# Patient Record
Sex: FEMALE | Race: OTHER | NOT HISPANIC OR LATINO | URBAN - METROPOLITAN AREA
[De-identification: names, ages, dates, MRNs, and addresses within clinical notes are randomized per-mention and may not be internally consistent; named-entity substitution may affect disease eponyms.]

---

## 2018-03-16 ENCOUNTER — EMERGENCY (EMERGENCY)
Facility: HOSPITAL | Age: 58
LOS: 1 days | Discharge: HOME | End: 2018-03-16
Attending: EMERGENCY MEDICINE | Admitting: EMERGENCY MEDICINE
Payer: MEDICAID

## 2018-03-16 VITALS
OXYGEN SATURATION: 100 % | SYSTOLIC BLOOD PRESSURE: 157 MMHG | HEART RATE: 76 BPM | TEMPERATURE: 98 F | RESPIRATION RATE: 16 BRPM | DIASTOLIC BLOOD PRESSURE: 78 MMHG

## 2018-03-16 PROCEDURE — 99283 EMERGENCY DEPT VISIT LOW MDM: CPT

## 2018-03-16 RX ORDER — IBUPROFEN 200 MG
600 TABLET ORAL ONCE
Qty: 0 | Refills: 0 | Status: COMPLETED | OUTPATIENT
Start: 2018-03-16 | End: 2018-03-16

## 2018-03-16 RX ORDER — OXYCODONE AND ACETAMINOPHEN 5; 325 MG/1; MG/1
1 TABLET ORAL ONCE
Qty: 0 | Refills: 0 | Status: DISCONTINUED | OUTPATIENT
Start: 2018-03-16 | End: 2018-03-16

## 2018-03-16 RX ORDER — VALACYCLOVIR 500 MG/1
1 TABLET, FILM COATED ORAL
Qty: 21 | Refills: 0 | OUTPATIENT
Start: 2018-03-16 | End: 2018-03-22

## 2018-03-16 RX ORDER — IBUPROFEN 200 MG
1 TABLET ORAL
Qty: 20 | Refills: 0 | OUTPATIENT
Start: 2018-03-16

## 2018-03-16 RX ORDER — VALACYCLOVIR 500 MG/1
1000 TABLET, FILM COATED ORAL ONCE
Qty: 0 | Refills: 0 | Status: COMPLETED | OUTPATIENT
Start: 2018-03-16 | End: 2018-03-16

## 2018-03-16 RX ADMIN — Medication 600 MILLIGRAM(S): at 22:00

## 2018-03-16 RX ADMIN — VALACYCLOVIR 1000 MILLIGRAM(S): 500 TABLET, FILM COATED ORAL at 22:54

## 2018-03-16 RX ADMIN — OXYCODONE AND ACETAMINOPHEN 1 TABLET(S): 5; 325 TABLET ORAL at 22:50

## 2018-03-16 RX ADMIN — OXYCODONE AND ACETAMINOPHEN 1 TABLET(S): 5; 325 TABLET ORAL at 22:00

## 2018-03-16 RX ADMIN — Medication 600 MILLIGRAM(S): at 22:50

## 2018-03-16 NOTE — ED PROVIDER NOTE - OBJECTIVE STATEMENT
57F with PMH of DM and CAD who comes in with 3 days of rash to the L flank radiating to the L groin.  + vesicles.  No F/C/N/V/D/urinary sx/CP/SOB/cough/HA.  + daughter pregnant x 3 months who has already had chicken pox.

## 2018-03-16 NOTE — ED ADULT TRIAGE NOTE - CHIEF COMPLAINT QUOTE
pt arrives w/ c/o left flank pain radiating to left groin. pt with red rash and blister filled from groin wrapping to left back side. pt placed in iso rm.

## 2018-03-16 NOTE — ED PROVIDER NOTE - SKIN COLOR
maculopapular and vesicular rash from L flank to L groin in a dermatomal distribution, not crusted over yet

## 2018-03-16 NOTE — ED PROVIDER NOTE - MEDICAL DECISION MAKING DETAILS
Cabot: 57F with shingles.  I used a Stevenson  to tell her and her daughter that she should not be in contact with anyone who has not had chicken pox or the vaccine, and she should stay away from children, elderly people, and pregnant people.  Her daughter has an OB/gyn who she will see on Sunday to be tested for varicella IgG.  I spoke with gyn and confirmed that this is the appropriate management for pregnant exposure to varicella.

## 2018-03-17 RX ORDER — VALACYCLOVIR 500 MG/1
1 TABLET, FILM COATED ORAL
Qty: 21 | Refills: 0 | OUTPATIENT
Start: 2018-03-17 | End: 2018-03-23

## 2018-03-17 NOTE — ED POST DISCHARGE NOTE - ADDITIONAL DOCUMENTATION
Received call from patient stating is at the pharmacy and rx was sent, checked chart and saw it was printed.  Called in RX for valtrex 1g tid x 7 days and motrin 600mg every 8 hours as needed for pain.

## 2018-03-31 ENCOUNTER — EMERGENCY (EMERGENCY)
Facility: HOSPITAL | Age: 58
LOS: 1 days | Discharge: HOME | End: 2018-03-31
Admitting: EMERGENCY MEDICINE
Payer: MEDICAID

## 2018-03-31 ENCOUNTER — EMERGENCY (EMERGENCY)
Facility: HOSPITAL | Age: 58
LOS: 1 days | Discharge: HOME | End: 2018-03-31
Attending: EMERGENCY MEDICINE | Admitting: HOSPITALIST
Payer: MEDICAID

## 2018-03-31 VITALS
HEART RATE: 75 BPM | DIASTOLIC BLOOD PRESSURE: 72 MMHG | TEMPERATURE: 98 F | OXYGEN SATURATION: 100 % | SYSTOLIC BLOOD PRESSURE: 151 MMHG | RESPIRATION RATE: 16 BRPM

## 2018-03-31 VITALS
OXYGEN SATURATION: 100 % | SYSTOLIC BLOOD PRESSURE: 173 MMHG | HEART RATE: 88 BPM | TEMPERATURE: 98 F | RESPIRATION RATE: 18 BRPM | DIASTOLIC BLOOD PRESSURE: 83 MMHG

## 2018-03-31 PROBLEM — I25.10 ATHEROSCLEROTIC HEART DISEASE OF NATIVE CORONARY ARTERY WITHOUT ANGINA PECTORIS: Chronic | Status: ACTIVE | Noted: 2018-03-16

## 2018-03-31 PROBLEM — E11.9 TYPE 2 DIABETES MELLITUS WITHOUT COMPLICATIONS: Chronic | Status: ACTIVE | Noted: 2018-03-16

## 2018-03-31 LAB
ALBUMIN SERPL ELPH-MCNC: 4.1 G/DL — SIGNIFICANT CHANGE UP (ref 3.3–5)
ALP SERPL-CCNC: 106 U/L — SIGNIFICANT CHANGE UP (ref 40–120)
ALT FLD-CCNC: 23 U/L — SIGNIFICANT CHANGE UP (ref 4–33)
APTT BLD: 30.4 SEC — SIGNIFICANT CHANGE UP (ref 27.5–37.4)
AST SERPL-CCNC: 22 U/L — SIGNIFICANT CHANGE UP (ref 4–32)
BASE EXCESS BLDV CALC-SCNC: 3.2 MMOL/L — SIGNIFICANT CHANGE UP
BASOPHILS # BLD AUTO: 0.06 K/UL — SIGNIFICANT CHANGE UP (ref 0–0.2)
BASOPHILS NFR BLD AUTO: 0.6 % — SIGNIFICANT CHANGE UP (ref 0–2)
BILIRUB SERPL-MCNC: 0.3 MG/DL — SIGNIFICANT CHANGE UP (ref 0.2–1.2)
BLD GP AB SCN SERPL QL: NEGATIVE — SIGNIFICANT CHANGE UP
BLOOD GAS VENOUS - CREATININE: 0.38 MG/DL — LOW (ref 0.5–1.3)
BUN SERPL-MCNC: 11 MG/DL — SIGNIFICANT CHANGE UP (ref 7–23)
CALCIUM SERPL-MCNC: 9 MG/DL — SIGNIFICANT CHANGE UP (ref 8.4–10.5)
CHLORIDE BLDV-SCNC: 101 MMOL/L — SIGNIFICANT CHANGE UP (ref 96–108)
CHLORIDE SERPL-SCNC: 99 MMOL/L — SIGNIFICANT CHANGE UP (ref 98–107)
CK MB BLD-MCNC: 1 NG/ML — SIGNIFICANT CHANGE UP (ref 1–4.7)
CK SERPL-CCNC: 61 U/L — SIGNIFICANT CHANGE UP (ref 25–170)
CO2 SERPL-SCNC: 27 MMOL/L — SIGNIFICANT CHANGE UP (ref 22–31)
CREAT SERPL-MCNC: 0.52 MG/DL — SIGNIFICANT CHANGE UP (ref 0.5–1.3)
EOSINOPHIL # BLD AUTO: 0.25 K/UL — SIGNIFICANT CHANGE UP (ref 0–0.5)
EOSINOPHIL NFR BLD AUTO: 2.4 % — SIGNIFICANT CHANGE UP (ref 0–6)
GAS PNL BLDV: 138 MMOL/L — SIGNIFICANT CHANGE UP (ref 136–146)
GLUCOSE BLDV-MCNC: 202 — HIGH (ref 70–99)
GLUCOSE SERPL-MCNC: 209 MG/DL — HIGH (ref 70–99)
HCO3 BLDV-SCNC: 26 MMOL/L — SIGNIFICANT CHANGE UP (ref 20–27)
HCT VFR BLD CALC: 40.9 % — SIGNIFICANT CHANGE UP (ref 34.5–45)
HCT VFR BLDV CALC: 41.3 % — SIGNIFICANT CHANGE UP (ref 34.5–45)
HGB BLD-MCNC: 13.1 G/DL — SIGNIFICANT CHANGE UP (ref 11.5–15.5)
HGB BLDV-MCNC: 13.4 G/DL — SIGNIFICANT CHANGE UP (ref 11.5–15.5)
IMM GRANULOCYTES # BLD AUTO: 0.03 # — SIGNIFICANT CHANGE UP
IMM GRANULOCYTES NFR BLD AUTO: 0.3 % — SIGNIFICANT CHANGE UP (ref 0–1.5)
INR BLD: 0.97 — SIGNIFICANT CHANGE UP (ref 0.88–1.17)
LACTATE BLDV-MCNC: 2.2 MMOL/L — HIGH (ref 0.5–2)
LYMPHOCYTES # BLD AUTO: 3.75 K/UL — HIGH (ref 1–3.3)
LYMPHOCYTES # BLD AUTO: 36 % — SIGNIFICANT CHANGE UP (ref 13–44)
MCHC RBC-ENTMCNC: 24.7 PG — LOW (ref 27–34)
MCHC RBC-ENTMCNC: 32 % — SIGNIFICANT CHANGE UP (ref 32–36)
MCV RBC AUTO: 77.2 FL — LOW (ref 80–100)
MONOCYTES # BLD AUTO: 0.75 K/UL — SIGNIFICANT CHANGE UP (ref 0–0.9)
MONOCYTES NFR BLD AUTO: 7.2 % — SIGNIFICANT CHANGE UP (ref 2–14)
NEUTROPHILS # BLD AUTO: 5.59 K/UL — SIGNIFICANT CHANGE UP (ref 1.8–7.4)
NEUTROPHILS NFR BLD AUTO: 53.5 % — SIGNIFICANT CHANGE UP (ref 43–77)
NRBC # FLD: 0 — SIGNIFICANT CHANGE UP
PCO2 BLDV: 49 MMHG — SIGNIFICANT CHANGE UP (ref 41–51)
PH BLDV: 7.37 PH — SIGNIFICANT CHANGE UP (ref 7.32–7.43)
PLATELET # BLD AUTO: 308 K/UL — SIGNIFICANT CHANGE UP (ref 150–400)
PMV BLD: 10.3 FL — SIGNIFICANT CHANGE UP (ref 7–13)
PO2 BLDV: 43 MMHG — HIGH (ref 35–40)
POTASSIUM BLDV-SCNC: 4.5 MMOL/L — SIGNIFICANT CHANGE UP (ref 3.4–4.5)
POTASSIUM SERPL-MCNC: 4.5 MMOL/L — SIGNIFICANT CHANGE UP (ref 3.5–5.3)
POTASSIUM SERPL-SCNC: 4.5 MMOL/L — SIGNIFICANT CHANGE UP (ref 3.5–5.3)
PROT SERPL-MCNC: 7.9 G/DL — SIGNIFICANT CHANGE UP (ref 6–8.3)
PROTHROM AB SERPL-ACNC: 10.8 SEC — SIGNIFICANT CHANGE UP (ref 9.8–13.1)
RBC # BLD: 5.3 M/UL — HIGH (ref 3.8–5.2)
RBC # FLD: 16.3 % — HIGH (ref 10.3–14.5)
RH IG SCN BLD-IMP: POSITIVE — SIGNIFICANT CHANGE UP
SAO2 % BLDV: 74.5 % — SIGNIFICANT CHANGE UP (ref 60–85)
SODIUM SERPL-SCNC: 138 MMOL/L — SIGNIFICANT CHANGE UP (ref 135–145)
TROPONIN T SERPL-MCNC: < 0.06 NG/ML — SIGNIFICANT CHANGE UP (ref 0–0.06)
WBC # BLD: 10.43 K/UL — SIGNIFICANT CHANGE UP (ref 3.8–10.5)
WBC # FLD AUTO: 10.43 K/UL — SIGNIFICANT CHANGE UP (ref 3.8–10.5)

## 2018-03-31 PROCEDURE — 99285 EMERGENCY DEPT VISIT HI MDM: CPT

## 2018-03-31 PROCEDURE — 99282 EMERGENCY DEPT VISIT SF MDM: CPT

## 2018-03-31 RX ORDER — SODIUM CHLORIDE 9 MG/ML
1000 INJECTION INTRAMUSCULAR; INTRAVENOUS; SUBCUTANEOUS ONCE
Qty: 0 | Refills: 0 | Status: COMPLETED | OUTPATIENT
Start: 2018-03-31 | End: 2018-03-31

## 2018-03-31 RX ORDER — ASPIRIN/CALCIUM CARB/MAGNESIUM 324 MG
81 TABLET ORAL ONCE
Qty: 0 | Refills: 0 | Status: COMPLETED | OUTPATIENT
Start: 2018-03-31 | End: 2018-03-31

## 2018-03-31 NOTE — ED ADULT TRIAGE NOTE - CHIEF COMPLAINT QUOTE
R/O CVA    as per son Iaw... pt was washing dishes at approx 8:30pm and couldn't close the faucet, had slurred and slow speech, unsteady gait. accriccardo 209. seen by carolynn- code stroke called.

## 2018-03-31 NOTE — ED PROVIDER NOTE - OBJECTIVE STATEMENT
56yo F pmh cad, dm bibems for slurred speech, LUE & LLE weakness since 1.5 hour ago, called in as code stroke I,hannah garcia, can speak Yakut & delfina fluently & served as interpretor for pt   58yo F pmh cad, dm, hld, abnormal heart rhythm (Afib since pt on diltiazem) bibems for slurred speech, slurred speech  from 6-6.30pm. also felt LUE weaknes & generalized weakness. felt RUE numbness. symptoms resolved on their own pt now back to baseline., called in as code stroke. As per family, water was coming out of her mouth when she was trying to speak. minutes. She felt weakness overall. She denied weakness, numbness, headache and change in vision currently. Her speech is back to baseline. I,hannah garcia, can speak Serbian & delfina fluently & served as interpretor for pt   56yo F pmh cad, dm, hld, abnormal heart rhythm ( possible Afib since pt on diltiazem) bibems for slurred speech from 6-6.30pm. also felt LUE weaknes & generalized weakness. felt RUE numbness. symptoms resolved on their own pt now back to baseline., called in as code stroke. As per family, water was coming out of her mouth when she was trying to speak. minutes. She felt weakness overall. She denied weakness, numbness, headache and change in vision currently. Her speech is back to baseline.

## 2018-03-31 NOTE — ED ADULT NURSE NOTE - OBJECTIVE STATEMENT
Niranjan RN received pt to CT nonenglish speaking family at bedside translates, pt reports waking up from nap around 930 and "feeling like she couldn't talk". Also c/o B/L upper extremity weakness. No extremity weakness noted at this time, no speech slurring, no facial droop. Family reports hx of CAD and DM, pt pending CT scan, neuro at bedside, IV placed, labs sent, VS as documented, will continue to monitor.

## 2018-03-31 NOTE — ED PROVIDER NOTE - MEDICAL DECISION MAKING DETAILS
57 Y F with PMH of CAD, DM, HTN presented to ED after episode of slurred speech at home. As per family, water was coming out of her mouth when she was trying to speak. Her NIHSS was 0 and MRS is 0. Her symptoms resolved after 30 minutes. She felt weakness overall. She denied weakness, numbness, headache and change in vision currently. Her speech is back to baseline. ct h wnl, neuro consulted, send to cdu for mri 56yo F pmh cad, dm, hld, abnormal heart rhythm ( possible Afib since pt on diltiazem) p/w transiet slurred speech, UE numbness. CTh wnl, neuro consulted, send to cdu for mri

## 2018-03-31 NOTE — CONSULT NOTE ADULT - SUBJECTIVE AND OBJECTIVE BOX
HPI:    57 Y F with PMH of CAD, DM, HTN presented to ED after episode of slurred speech at home. As per family, water was coming out of her mouth when she was trying to speak. Her NIHSS was 0 and MRS is 0. Her symptoms resolved after 30 minutes. She felt weakness overall. She denied weakness, numbness, headache and change in vision currently. Her speech is back to baseline.       MEDICATIONS  (STANDING):    MEDICATIONS  (PRN):      PAST MEDICAL & SURGICAL HISTORY:  CAD (coronary artery disease)  DM (diabetes mellitus)  No significant past surgical history      FAMILY HISTORY:      Allergies    No Known Allergies    Intolerances          SHx - No smoking, No ETOH, No drug abuse      Review of Systems:  CONSTITUTIONAL:  No weight loss, fever, chills, weakness or fatigue.  HEENT:  Eyes:  No visual loss, blurred vision, double vision or yellow sclerae. Ears, Nose, Throat:  No hearing loss, sneezing, congestion, runny nose or sore throat.  SKIN:  No rash or itching.  CARDIOVASCULAR:  No chest pain, chest pressure or chest discomfort. No palpitations or edema.  RESPIRATORY:  No shortness of breath, cough or sputum.  GASTROINTESTINAL:  No anorexia, nausea, vomiting or diarrhea. No abdominal pain or blood.  GENITOURINARY:  NO Burning on urination.   NEUROLOGICAL: See HPI  MUSCULOSKELETAL:  No muscle, back pain, joint pain or stiffness.  HEMATOLOGIC:  No anemia, bleeding or bruising.  LYMPHATICS:  No enlarged nodes. No history of splenectomy.  PSYCHIATRIC:  No history of depression or anxiety.  ENDOCRINOLOGIC:  No reports of sweating, cold or heat intolerance. No polyuria or polydipsia.  ALLERGIES:  No history of asthma, hives, eczema or rhinitis.        Vital Signs Last 24 Hrs  T(C): 36.7 (31 Mar 2018 22:07), Max: 36.7 (31 Mar 2018 22:07)  T(F): 98 (31 Mar 2018 22:07), Max: 98 (31 Mar 2018 22:07)  HR: 88 (31 Mar 2018 22:07) (75 - 88)  BP: 173/83 (31 Mar 2018 22:07) (151/72 - 173/83)  BP(mean): --  RR: 18 (31 Mar 2018 22:07) (16 - 18)  SpO2: 100% (31 Mar 2018 22:07) (100% - 100%)    General Exam:   General appearance: No acute distress                   Neurological Exam:    Mental Status: Orientated to self, date and place.  Attention intact.  No dysarthria, aphasia or neglect.  Cranial Nerves: PERRL, EOMI, CN V1-3 intact to light touch and pinprick.  No facial asymmetry, Tongue, uvula and palate midline.      Motor:   Tone: normal.                  Strength:   intact 5/5 in all four extremities   Pronator drift: none                 Dysmetria: None to finger-nose-finger or heel-shin-heel  No truncal ataxia.    Tremor: No resting, postural or action tremor.  No myoclonus.    Sensation: intact to light touch    Deep Tendon Reflexes: 1+ bilateral biceps, triceps, brachioradialis, knee and ankle  Toes flexor bilaterally    Gait: normal and stable.      Other:    03-31    138  |  99  |  11  ----------------------------<  209<H>  4.5   |  27  |  0.52    Ca    9.0      31 Mar 2018 22:20    TPro  7.9  /  Alb  4.1  /  TBili  0.3  /  DBili  x   /  AST  22  /  ALT  23  /  AlkPhos  106  03-31 03-31    138  |  99  |  11  ----------------------------<  209<H>  4.5   |  27  |  0.52    Ca    9.0      31 Mar 2018 22:20    TPro  7.9  /  Alb  4.1  /  TBili  0.3  /  DBili  x   /  AST  22  /  ALT  23  /  AlkPhos  106  03-31                          13.1   10.43 )-----------( 308      ( 31 Mar 2018 22:20 )             40.9       Radiology    CT head     < from: CT Brain Stroke Protocol (03.31.18 @ 22:27) >    IMPRESSION:     No acute intracranial hemorrhage or mass effect.    < end of copied text >    CTA head and neck : unremarkable to my eye - official report pending

## 2018-03-31 NOTE — ED PROVIDER NOTE - MEDICAL DECISION MAKING DETAILS
pt with post herpetic neuralgia. Unable to tolerate gabapentin but does get some relief with ibuprofen. Will d/c on ibuprofen with pmd f/u

## 2018-03-31 NOTE — ED PROVIDER NOTE - ATTENDING CONTRIBUTION TO CARE
57F p/w BIBEMS with c/o slurred speech, L side weakness since 630Pm.  WAs seen here earlier for shingles pain.  Was trying to get water from a tap and both hands felt numb, L hand was weak.  Since last week was in bed, not getting around due to pain from the shingles.  Was also having slurred speech at 630pm.  Pt was drooling and couldn't speak properly.  No LE weakness/numbness.  Arm sx resolved in 1/2 hours, but speech is still slurred per family and per her as well.  Never happened before.    PMHX CAD, DM, htn, anemia, "fast HR"  meds - metformin, enalapril, iron, diltiazem, gabapentin  PMD - Beau Jeferson  PSHX - KIM  All - nka (intol NSAIDS) 57F p/w BIBEMS with c/o slurred speech, L side weakness since 630Pm.  WAs seen here earlier for shingles pain.  Was trying to get water from a tap and both hands felt numb, L hand was weak.  Since last week was in bed, not getting around due to pain from the shingles.  Was also having slurred speech at 630pm.  Pt was drooling and couldn't speak properly.  No LE weakness/numbness.  Arm sx resolved in 1/2 hours, but speech is still slurred per family and per her as well.  Never happened before.    PMHX CAD, DM, htn, anemia, "fast HR"  meds - metformin, enalapril, iron, diltiazem, gabapentin  PMD - Beau Jeferson  PSHX - KIM  All - nka (intol NSAIDS)  VS:  unremarkable except HTN    GEN - NAD; well appearing; A+O x3 Speech fluent  HEAD - NC/AT     ENT - PEERL, EOMI, mucous membranes  moist , no discharge      NECK: Neck supple, non-tender without lymphadenopathy, no masses, no JVD  PULM - CTA b/l,  symmetric breath sounds  COR -  normal heart sounds    ABD - , ND, NT, soft, no guarding, no rebound, no masses    BACK - no CVA tenderness, nontender spine     EXTREMS - no edema, no deformity, warm and well perfused    SKIN - no rash or bruising      NEUROLOGIC - alert, CN 2-12 intact, sensation nl, motor 5/5 RUE/LUE/RLE/LLE.

## 2018-03-31 NOTE — ED PROVIDER NOTE - CARE PLAN
Principal Discharge DX:	Numbness Principal Discharge DX:	Numbness  Secondary Diagnosis:	Slurred speech  Secondary Diagnosis:	Transient cerebral ischemia, unspecified type

## 2018-03-31 NOTE — ED PROVIDER NOTE - NSCAREINITIATED _GEN_ER
----- Message from Mirella Dami sent at 2/10/2017 11:02 AM CST -----  Contact: Self- [phone]  Sunil- pt called to schedule an ep appt with Dr. Barber- diarrhea and severe abdominal pain- pain going on for weeks now- please call pt back at [phone]  
Spoke to pt and appt scheduled for pt to see Shellie Garcia on 2/14 at 10 am.  
Dany Carrera)

## 2018-03-31 NOTE — ED PROVIDER NOTE - OBJECTIVE STATEMENT
58 y/o female, h/o dm and cad, was seen here 3/16 and dx shingles affecting left lower back and left upper thigh. She was rx ibuprofen and valtrex which she took. Lesions have dried and are fading but she is still c/o itching and burning to the same areas. Her pmd rx gabapentin but doesn't take it because it makes her dizzy. Denies fever new lesions in other places.

## 2018-03-31 NOTE — ED ADULT TRIAGE NOTE - CHIEF COMPLAINT QUOTE
Pt aaox4 fouzia speaking daughter in law at bedside translates :Pt dx and completed treatment of non-disseminated shingles now c/o itching burning sensation to affected area (left flank, left abdomen, groin and left thigh).  Pt denies fevers chills.

## 2018-03-31 NOTE — CONSULT NOTE ADULT - ATTENDING COMMENTS
Agree with the above assessment and plan.  The patient was found to have an acute infarct in the right MCA distribution to my eye it does not appear microvascular raising suspicion over an embolic event.  Await final read of MRI Brain and MRA.    -HA1c: 7.5 target <6  -Awaiting Lipid Panel  -TTE w/ bubble  -Tele Monitor  -PT/OT

## 2018-04-01 DIAGNOSIS — Z98.890 OTHER SPECIFIED POSTPROCEDURAL STATES: Chronic | ICD-10-CM

## 2018-04-01 DIAGNOSIS — I63.9 CEREBRAL INFARCTION, UNSPECIFIED: ICD-10-CM

## 2018-04-01 DIAGNOSIS — E78.5 HYPERLIPIDEMIA, UNSPECIFIED: ICD-10-CM

## 2018-04-01 DIAGNOSIS — I10 ESSENTIAL (PRIMARY) HYPERTENSION: ICD-10-CM

## 2018-04-01 DIAGNOSIS — Z29.9 ENCOUNTER FOR PROPHYLACTIC MEASURES, UNSPECIFIED: ICD-10-CM

## 2018-04-01 DIAGNOSIS — E11.9 TYPE 2 DIABETES MELLITUS WITHOUT COMPLICATIONS: ICD-10-CM

## 2018-04-01 LAB
APPEARANCE UR: CLEAR — SIGNIFICANT CHANGE UP
BACTERIA # UR AUTO: SIGNIFICANT CHANGE UP
BASE EXCESS BLDV CALC-SCNC: 0.2 MMOL/L — SIGNIFICANT CHANGE UP
BILIRUB UR-MCNC: NEGATIVE — SIGNIFICANT CHANGE UP
BLOOD GAS VENOUS - CREATININE: < 0.36 MG/DL — LOW (ref 0.5–1.3)
BLOOD UR QL VISUAL: NEGATIVE — SIGNIFICANT CHANGE UP
BUN SERPL-MCNC: 9 MG/DL — SIGNIFICANT CHANGE UP (ref 7–23)
CALCIUM SERPL-MCNC: 9.1 MG/DL — SIGNIFICANT CHANGE UP (ref 8.4–10.5)
CHLORIDE BLDV-SCNC: 110 MMOL/L — HIGH (ref 96–108)
CHLORIDE SERPL-SCNC: 100 MMOL/L — SIGNIFICANT CHANGE UP (ref 98–107)
CHOLEST SERPL-MCNC: 141 MG/DL — SIGNIFICANT CHANGE UP (ref 120–199)
CO2 SERPL-SCNC: 27 MMOL/L — SIGNIFICANT CHANGE UP (ref 22–31)
COLOR SPEC: SIGNIFICANT CHANGE UP
CREAT SERPL-MCNC: 0.54 MG/DL — SIGNIFICANT CHANGE UP (ref 0.5–1.3)
GAS PNL BLDV: 138 MMOL/L — SIGNIFICANT CHANGE UP (ref 136–146)
GLUCOSE BLDV-MCNC: 134 — HIGH (ref 70–99)
GLUCOSE SERPL-MCNC: 197 MG/DL — HIGH (ref 70–99)
GLUCOSE UR-MCNC: NEGATIVE — SIGNIFICANT CHANGE UP
HBA1C BLD-MCNC: 7.5 % — HIGH (ref 4–5.6)
HCO3 BLDV-SCNC: 25 MMOL/L — SIGNIFICANT CHANGE UP (ref 20–27)
HCT VFR BLD CALC: 38.4 % — SIGNIFICANT CHANGE UP (ref 34.5–45)
HCT VFR BLDV CALC: 33.5 % — LOW (ref 34.5–45)
HDLC SERPL-MCNC: 61 MG/DL — SIGNIFICANT CHANGE UP (ref 45–65)
HGB BLD-MCNC: 12.2 G/DL — SIGNIFICANT CHANGE UP (ref 11.5–15.5)
HGB BLDV-MCNC: 10.9 G/DL — LOW (ref 11.5–15.5)
KETONES UR-MCNC: NEGATIVE — SIGNIFICANT CHANGE UP
LACTATE BLDV-MCNC: 2.7 MMOL/L — HIGH (ref 0.5–2)
LEUKOCYTE ESTERASE UR-ACNC: NEGATIVE — SIGNIFICANT CHANGE UP
LIPID PNL WITH DIRECT LDL SERPL: 67 MG/DL — SIGNIFICANT CHANGE UP
MAGNESIUM SERPL-MCNC: 1.8 MG/DL — SIGNIFICANT CHANGE UP (ref 1.6–2.6)
MCHC RBC-ENTMCNC: 24.7 PG — LOW (ref 27–34)
MCHC RBC-ENTMCNC: 31.8 % — LOW (ref 32–36)
MCV RBC AUTO: 77.9 FL — LOW (ref 80–100)
MUCOUS THREADS # UR AUTO: SIGNIFICANT CHANGE UP
NITRITE UR-MCNC: NEGATIVE — SIGNIFICANT CHANGE UP
NON-SQ EPI CELLS # UR AUTO: <1 — SIGNIFICANT CHANGE UP
NRBC # FLD: 0 — SIGNIFICANT CHANGE UP
PCO2 BLDV: 28 MMHG — LOW (ref 41–51)
PH BLDV: 7.52 PH — HIGH (ref 7.32–7.43)
PH UR: 7 — SIGNIFICANT CHANGE UP (ref 4.6–8)
PLATELET # BLD AUTO: 266 K/UL — SIGNIFICANT CHANGE UP (ref 150–400)
PMV BLD: 9.9 FL — SIGNIFICANT CHANGE UP (ref 7–13)
PO2 BLDV: 48 MMHG — HIGH (ref 35–40)
POTASSIUM BLDV-SCNC: 3.7 MMOL/L — SIGNIFICANT CHANGE UP (ref 3.4–4.5)
POTASSIUM SERPL-MCNC: 3.6 MMOL/L — SIGNIFICANT CHANGE UP (ref 3.5–5.3)
POTASSIUM SERPL-SCNC: 3.6 MMOL/L — SIGNIFICANT CHANGE UP (ref 3.5–5.3)
PROT UR-MCNC: 10 MG/DL — SIGNIFICANT CHANGE UP
RBC # BLD: 4.93 M/UL — SIGNIFICANT CHANGE UP (ref 3.8–5.2)
RBC # FLD: 16.1 % — HIGH (ref 10.3–14.5)
RBC CASTS # UR COMP ASSIST: SIGNIFICANT CHANGE UP (ref 0–?)
SAO2 % BLDV: 87.8 % — HIGH (ref 60–85)
SODIUM SERPL-SCNC: 138 MMOL/L — SIGNIFICANT CHANGE UP (ref 135–145)
SP GR SPEC: 1.02 — SIGNIFICANT CHANGE UP (ref 1–1.04)
SQUAMOUS # UR AUTO: SIGNIFICANT CHANGE UP
TRIGL SERPL-MCNC: 152 MG/DL — HIGH (ref 10–149)
TSH SERPL-MCNC: 1.52 UIU/ML — SIGNIFICANT CHANGE UP (ref 0.27–4.2)
UROBILINOGEN FLD QL: NORMAL MG/DL — SIGNIFICANT CHANGE UP
WBC # BLD: 8.61 K/UL — SIGNIFICANT CHANGE UP (ref 3.8–10.5)
WBC # FLD AUTO: 8.61 K/UL — SIGNIFICANT CHANGE UP (ref 3.8–10.5)
WBC UR QL: SIGNIFICANT CHANGE UP (ref 0–?)

## 2018-04-01 PROCEDURE — 93306 TTE W/DOPPLER COMPLETE: CPT | Mod: 26

## 2018-04-01 PROCEDURE — 99236 HOSP IP/OBS SAME DATE HI 85: CPT

## 2018-04-01 RX ORDER — INSULIN LISPRO 100/ML
VIAL (ML) SUBCUTANEOUS
Qty: 0 | Refills: 0 | Status: DISCONTINUED | OUTPATIENT
Start: 2018-04-01 | End: 2018-04-02

## 2018-04-01 RX ORDER — ACETAMINOPHEN 500 MG
650 TABLET ORAL EVERY 6 HOURS
Qty: 0 | Refills: 0 | Status: DISCONTINUED | OUTPATIENT
Start: 2018-04-01 | End: 2018-04-02

## 2018-04-01 RX ORDER — ATORVASTATIN CALCIUM 80 MG/1
80 TABLET, FILM COATED ORAL ONCE
Qty: 0 | Refills: 0 | Status: COMPLETED | OUTPATIENT
Start: 2018-04-01 | End: 2018-04-01

## 2018-04-01 RX ORDER — INSULIN LISPRO 100/ML
VIAL (ML) SUBCUTANEOUS AT BEDTIME
Qty: 0 | Refills: 0 | Status: DISCONTINUED | OUTPATIENT
Start: 2018-04-01 | End: 2018-04-02

## 2018-04-01 RX ORDER — ASPIRIN/CALCIUM CARB/MAGNESIUM 324 MG
81 TABLET ORAL DAILY
Qty: 0 | Refills: 0 | Status: DISCONTINUED | OUTPATIENT
Start: 2018-04-01 | End: 2018-04-02

## 2018-04-01 RX ORDER — SODIUM CHLORIDE 9 MG/ML
1000 INJECTION, SOLUTION INTRAVENOUS
Qty: 0 | Refills: 0 | Status: DISCONTINUED | OUTPATIENT
Start: 2018-04-01 | End: 2018-04-02

## 2018-04-01 RX ORDER — DEXTROSE 50 % IN WATER 50 %
1 SYRINGE (ML) INTRAVENOUS ONCE
Qty: 0 | Refills: 0 | Status: DISCONTINUED | OUTPATIENT
Start: 2018-04-01 | End: 2018-04-02

## 2018-04-01 RX ORDER — ENOXAPARIN SODIUM 100 MG/ML
40 INJECTION SUBCUTANEOUS EVERY 24 HOURS
Qty: 0 | Refills: 0 | Status: DISCONTINUED | OUTPATIENT
Start: 2018-04-01 | End: 2018-04-02

## 2018-04-01 RX ORDER — DEXTROSE 50 % IN WATER 50 %
25 SYRINGE (ML) INTRAVENOUS ONCE
Qty: 0 | Refills: 0 | Status: DISCONTINUED | OUTPATIENT
Start: 2018-04-01 | End: 2018-04-02

## 2018-04-01 RX ORDER — GLUCAGON INJECTION, SOLUTION 0.5 MG/.1ML
1 INJECTION, SOLUTION SUBCUTANEOUS ONCE
Qty: 0 | Refills: 0 | Status: DISCONTINUED | OUTPATIENT
Start: 2018-04-01 | End: 2018-04-02

## 2018-04-01 RX ORDER — ATORVASTATIN CALCIUM 80 MG/1
80 TABLET, FILM COATED ORAL AT BEDTIME
Qty: 0 | Refills: 0 | Status: DISCONTINUED | OUTPATIENT
Start: 2018-04-01 | End: 2018-04-02

## 2018-04-01 RX ORDER — DEXTROSE 50 % IN WATER 50 %
12.5 SYRINGE (ML) INTRAVENOUS ONCE
Qty: 0 | Refills: 0 | Status: DISCONTINUED | OUTPATIENT
Start: 2018-04-01 | End: 2018-04-02

## 2018-04-01 RX ADMIN — ENOXAPARIN SODIUM 40 MILLIGRAM(S): 100 INJECTION SUBCUTANEOUS at 20:02

## 2018-04-01 RX ADMIN — Medication 81 MILLIGRAM(S): at 00:10

## 2018-04-01 RX ADMIN — ATORVASTATIN CALCIUM 80 MILLIGRAM(S): 80 TABLET, FILM COATED ORAL at 21:54

## 2018-04-01 RX ADMIN — SODIUM CHLORIDE 1000 MILLILITER(S): 9 INJECTION INTRAMUSCULAR; INTRAVENOUS; SUBCUTANEOUS at 00:10

## 2018-04-01 RX ADMIN — ATORVASTATIN CALCIUM 80 MILLIGRAM(S): 80 TABLET, FILM COATED ORAL at 01:00

## 2018-04-01 RX ADMIN — Medication 81 MILLIGRAM(S): at 15:38

## 2018-04-01 NOTE — H&P ADULT - NSHPLANGTRANSLATORFT_GEN_A_CORE
Translation done via telephone with son - pt is not making sense and son states her dialect had changed, unable to use a translation phone (son having trouble understanding her)

## 2018-04-01 NOTE — H&P ADULT - ATTENDING COMMENTS
56 y/o woman with HTN, HLD, DM2, CAD who presented with slurred speech and unsteady gait last night. Stroke code was called and patient's initial CT head, CTA head/neck were negative. She then underwent MRI brain, and MRA head/neck, which showed small acute/subacute infarcts within the R corona radiata extending into R insular cortex, R frontal operculum with associated edema, without hemorrhagic transformation.    Patient on visit today denies any complaints. Resting comfortably.    Labs largely unremarkable. A1c 7.5.    Seen by neurology who recommended TTE with bubble. Awaiting this test.    Continue monitoring on telemetry.    Continue aspirin, statin. Will hold diltiazem to avoid excessive lowering of BP in setting of acute ischemic CVA.    Will need PT and OT.

## 2018-04-01 NOTE — H&P ADULT - ASSESSMENT
58 y/o Stevenson speaking female (translation done via telephone with son - pt's son states he was having trouble understanding her because she wasn't making sense and her dialect of Stevenson has changed since the stroke), with a PmHx of HTN, HLD, DM, presented to the [facility] ED c/o slurred speech. Pt is being admitted to telemetry for subacute/acute infarct noted on MRI.

## 2018-04-01 NOTE — ED CDU PROVIDER INITIAL DAY NOTE - MEDICAL DECISION MAKING DETAILS
58yo F pmh cad, dm, hld, abnormal heart rhythm (Afib since pt on diltiazem) bibems for slurred speech, slurred speech  from 6-6.30pm. also felt LUE weaknes & generalized weakness. felt RUE numbness. Patient is here as a R/O CVA vs TIA vs other acute neurologic condition. Currently following neurology guidelines for treatment, Patient has remained stable while in CDU, no acute changes at this time. MRI pending.

## 2018-04-01 NOTE — ED CDU PROVIDER DISPOSITION NOTE - CLINICAL COURSE
11:56 Shaye att: 57F h/o hld, niddm, cad, afib BIBEMS slurred speech, drooling, rue weakness. 3/13 18:00-18:30 Patient running faucet, unable to turn off faucet. Family noted drooling, slurred speech, rt hand clumsiness. Code stroke activated. Symptoms self-resolved. At present family notes speech still somewhat affected, denies drooling, pt herself denies rt hand clumsiness. Denies cp or sob. PE nad, aaox3, perrl, eomi, intact facial sensation, neg pronator drift, ctabl, rrr, s1s2, abd soft ntnd, intact lower extremity sensation and strength. Patient and patient's son notified by Stevenson-speaking Neuro resident MRI positive for CVA and patient will require admission to hospital for further testing. [place]  #[id number] (Stevenson) used by me. Inquired patient's understanding of CAT scan and plan. Patient reports she was not told results, diagnosis, or plan. Explained to patient at length MRI showed decreased blood flow to innermost part of brain, decreased blood flow resulted in change in speech and drooling. Admission to hospital upstairs for monitoring, repeat neuro exam, and preventative cholesterol and blood pressure control. Patient and patient's son express understanding.

## 2018-04-01 NOTE — H&P ADULT - NSHPPHYSICALEXAM_GEN_ALL_CORE
Vital Signs Last 24 Hrs  T(C): 37.2 (01 Apr 2018 10:28), Max: 37.2 (01 Apr 2018 10:28)  T(F): 99 (01 Apr 2018 10:28), Max: 99 (01 Apr 2018 10:28)  HR: 82 (01 Apr 2018 10:28) (72 - 89)  BP: 141/87 (01 Apr 2018 10:28) (141/87 - 173/83)  BP(mean): --  RR: 16 (01 Apr 2018 10:28) (16 - 18)  SpO2: 100% (01 Apr 2018 10:28) (100% - 100%)    EKG: NSR @ 77, T inv III

## 2018-04-01 NOTE — ED CDU PROVIDER INITIAL DAY NOTE - ATTENDING CONTRIBUTION TO CARE
Dr. Cr: I performed a face to face bedside interview with patient regarding history of present illness, review of symptoms and past medical history. I completed an independent physical exam.  I have discussed patient's plan of care with PA.   I agree with note as stated above, having amended the EMR as needed to reflect my findings.   This includes HISTORY OF PRESENT ILLNESS, HIV, PAST MEDICAL/SURGICAL/FAMILY/SOCIAL HISTORY, ALLERGIES AND HOME MEDICATIONS, REVIEW OF SYSTEMS, PHYSICAL EXAM, and any PROGRESS NOTES during the time I functioned as the attending physician for this patient. HPI above as by me. PE above as by me. DDX cva versus tia PLAN MRI brain, MRA brain, neuro repeat eval.

## 2018-04-01 NOTE — H&P ADULT - RS GEN PE MLT RESP DETAILS PC
airway patent/clear to auscultation bilaterally/breath sounds equal/no chest wall tenderness/respirations non-labored/good air movement

## 2018-04-01 NOTE — ED CDU PROVIDER INITIAL DAY NOTE - OBJECTIVE STATEMENT
58yo F pmh cad, dm, hld, abnormal heart rhythm (Afib since pt on diltiazem) bibems for slurred speech, slurred speech  from 6-6.30pm. also felt LUE weaknes & generalized weakness. felt RUE numbness. symptoms resolved on their own pt now back to baseline., called in as code stroke. As per family, water was coming out of her mouth when she was trying to speak. minutes. She felt weakness overall. She denied weakness, numbness, headache and change in vision currently. Her speech is back to baseline.    CDU ISABEL Elise: Agree with above, Patient interviewed and re-examined. [facility]  [id number] used to communicate with patient. Patient states she was by a running faucet, she suddenly became unable to turn off the faucet and her family noticed water was dripping from her mouth. She felt weak overall. She states she has not felt any new or changing symptoms since then. She currently denies any new or changing symptoms such as chest pain, shortness of breath, abdominal pain or any other symptoms at this time. 58yo F pmh cad, dm, hld, abnormal heart rhythm (Afib since pt on diltiazem) bibems for slurred speech, slurred speech  from 6-6.30pm. also felt LUE weaknes & generalized weakness. felt RUE numbness. symptoms resolved on their own pt now back to baseline., called in as code stroke. As per family, water was coming out of her mouth when she was trying to speak. minutes. She felt weakness overall. She denied weakness, numbness, headache and change in vision currently. Her speech is back to baseline.    CDU ISABEL Elise: Agree with above, Patient interviewed and re-examined. [place]  [id number] used to communicate with patient. Patient states she was by a running faucet, she suddenly became unable to turn off the faucet and her family noticed water was dripping from her mouth. She felt weak overall. She states she has not felt any new or changing symptoms since then. She currently denies any new or changing symptoms such as chest pain, shortness of breath, abdominal pain or any other symptoms at this time.    Shaye att: 57F h/o hld, niddm, cad, afib BIBEMS slurred speech, drooling, rue weakness. 3/13 18:00-18:30 Patient running faucet, unable to turn off faucet. Family noted drooling, slurred speech, rt hand clumsiness. Code stroke activated. Symptoms self-resolved. At present family notes speech still somewhat affected, denies drooling, pt herself denies rt hand clumsiness. Denies cp or sob. PE nad, aaox3, perrl, eomi, intact facial sensation, neg pronator drift, ctabl, rrr, s1s2, abd soft ntnd, intact lower extremity sensation and strength. PLAN MRI brain, MRA brain, neuro repeat eval.

## 2018-04-01 NOTE — ED CDU PROVIDER DISPOSITION NOTE - ATTENDING CONTRIBUTION TO CARE
Dr. Cr: I performed a face to face bedside interview with patient regarding history of present illness, review of symptoms and past medical history. I completed an independent physical exam.  I have discussed patient's plan of care with PA.   I agree with note as stated above, having amended the EMR as needed to reflect my findings.   This includes HISTORY OF PRESENT ILLNESS, HIV, PAST MEDICAL/SURGICAL/FAMILY/SOCIAL HISTORY, ALLERGIES AND HOME MEDICATIONS, REVIEW OF SYSTEMS, PHYSICAL EXAM, and any PROGRESS NOTES during the time I functioned as the attending physician for this patient. HPI above as by me. PE above as by me. DDX cva PLAN admit tele monitored bed, lipid panel, likely speech therapy, repeat neuro evals.

## 2018-04-01 NOTE — ED CDU PROVIDER INITIAL DAY NOTE - PHYSICAL EXAMINATION
Patient currently is lethargic, possibly because it is 3 AM. She has no facial droop, is not slurring her speech and is moving all extremies, has good strength and was able to ambulate to the bathroom without difficulty.

## 2018-04-01 NOTE — ED ADULT NURSE REASSESSMENT NOTE - NIH STROKE SCALE: 4. FACIAL PALSY, QM
(0) Normal symmetrical movements
(1) Minor paralysis (flattened nasolabial fold, asymmetry on smiling)
(1) Minor paralysis (flattened nasolabial fold, asymmetry on smiling)

## 2018-04-01 NOTE — H&P ADULT - NEGATIVE OPHTHALMOLOGIC SYMPTOMS
no diplopia/no loss of vision L/no photophobia/no blurred vision L/no blurred vision R/no loss of vision R

## 2018-04-01 NOTE — H&P ADULT - HISTORY OF PRESENT ILLNESS
56 y/o Stevenson speaking female (translation done via telephone with son - pt's son states he was having trouble understanding her because she wasn't making sense and her dialect of Stevenson has changed since the stroke), with a PmHx of HTN, HLD, DM, presented to the [facility] ED c/o slurred speech. Pt's son states at about 2030hrs last night, they all had finished eating dinner and she was washing dishes when she was having trouble turning off the faucet and then began to have slurred and slowed speech with an unsteady gait so they called 911. In the ED, a code stroke was called and was seen by [facility] and had an NIHSS of 0 and an MRS of 0. She was placed in CDU for further observation. While in the CDU, she had a CTA head/neck that were done and was negative. She then had an MRI/MRA Head/Neck that were done that showed small acute/subacute infarcts within the right corona radiata extending into the right insular cortex and right frontal operculum with associated cytotoxic edema. No hemorrhagic transformation. Pt's son stated his mother denied any fever, chills, chest pain sob, HA, blurred vision, n/v. She was only c/o feeling a little dizzy and cold. She appears comfortable at this time. She was then admitted to telemetry for further medical treatment and management.

## 2018-04-01 NOTE — H&P ADULT - NSHPSOCIALHISTORY_GEN_ALL_CORE
Marital Status:     Occupation: Homemaker    Tobacco Use: neg    ETOH Use: neg    Flu Vaccine:     neg                             Pneumonia Vaccine:          neg

## 2018-04-01 NOTE — ED CDU PROVIDER INITIAL DAY NOTE - NS ED ROS FT
Pt is currently asymptomatic. Previously noted diffuse weakness prior to arrival in ED. Patient is currently ambulating without difficulty.

## 2018-04-02 ENCOUNTER — TRANSCRIPTION ENCOUNTER (OUTPATIENT)
Age: 58
End: 2018-04-02

## 2018-04-02 VITALS
OXYGEN SATURATION: 100 % | RESPIRATION RATE: 18 BRPM | HEART RATE: 79 BPM | SYSTOLIC BLOOD PRESSURE: 157 MMHG | TEMPERATURE: 98 F | DIASTOLIC BLOOD PRESSURE: 92 MMHG

## 2018-04-02 DIAGNOSIS — E11.8 TYPE 2 DIABETES MELLITUS WITH UNSPECIFIED COMPLICATIONS: ICD-10-CM

## 2018-04-02 DIAGNOSIS — I10 ESSENTIAL (PRIMARY) HYPERTENSION: ICD-10-CM

## 2018-04-02 DIAGNOSIS — I63.9 CEREBRAL INFARCTION, UNSPECIFIED: ICD-10-CM

## 2018-04-02 LAB
BUN SERPL-MCNC: 10 MG/DL — SIGNIFICANT CHANGE UP (ref 7–23)
CALCIUM SERPL-MCNC: 9 MG/DL — SIGNIFICANT CHANGE UP (ref 8.4–10.5)
CHLORIDE SERPL-SCNC: 97 MMOL/L — LOW (ref 98–107)
CO2 SERPL-SCNC: 27 MMOL/L — SIGNIFICANT CHANGE UP (ref 22–31)
CREAT SERPL-MCNC: 0.51 MG/DL — SIGNIFICANT CHANGE UP (ref 0.5–1.3)
GLUCOSE SERPL-MCNC: 138 MG/DL — HIGH (ref 70–99)
HCT VFR BLD CALC: 39.4 % — SIGNIFICANT CHANGE UP (ref 34.5–45)
HGB BLD-MCNC: 12.6 G/DL — SIGNIFICANT CHANGE UP (ref 11.5–15.5)
MCHC RBC-ENTMCNC: 24.7 PG — LOW (ref 27–34)
MCHC RBC-ENTMCNC: 32 % — SIGNIFICANT CHANGE UP (ref 32–36)
MCV RBC AUTO: 77.3 FL — LOW (ref 80–100)
NRBC # FLD: 0 — SIGNIFICANT CHANGE UP
PLATELET # BLD AUTO: 264 K/UL — SIGNIFICANT CHANGE UP (ref 150–400)
PMV BLD: 10.7 FL — SIGNIFICANT CHANGE UP (ref 7–13)
POTASSIUM SERPL-MCNC: 3.9 MMOL/L — SIGNIFICANT CHANGE UP (ref 3.5–5.3)
POTASSIUM SERPL-SCNC: 3.9 MMOL/L — SIGNIFICANT CHANGE UP (ref 3.5–5.3)
RBC # BLD: 5.1 M/UL — SIGNIFICANT CHANGE UP (ref 3.8–5.2)
RBC # FLD: 16.3 % — HIGH (ref 10.3–14.5)
SODIUM SERPL-SCNC: 138 MMOL/L — SIGNIFICANT CHANGE UP (ref 135–145)
WBC # BLD: 7.97 K/UL — SIGNIFICANT CHANGE UP (ref 3.8–10.5)
WBC # FLD AUTO: 7.97 K/UL — SIGNIFICANT CHANGE UP (ref 3.8–10.5)

## 2018-04-02 RX ORDER — ACETAMINOPHEN 500 MG
2 TABLET ORAL
Qty: 0 | Refills: 0 | COMMUNITY
Start: 2018-04-02

## 2018-04-02 RX ORDER — ASPIRIN/CALCIUM CARB/MAGNESIUM 324 MG
1 TABLET ORAL
Qty: 0 | Refills: 0 | COMMUNITY
Start: 2018-04-02

## 2018-04-02 RX ORDER — ATORVASTATIN CALCIUM 80 MG/1
1 TABLET, FILM COATED ORAL
Qty: 30 | Refills: 0 | OUTPATIENT
Start: 2018-04-02 | End: 2018-05-01

## 2018-04-02 RX ADMIN — Medication 81 MILLIGRAM(S): at 12:07

## 2018-04-02 RX ADMIN — Medication 1: at 12:25

## 2018-04-02 NOTE — OCCUPATIONAL THERAPY INITIAL EVALUATION ADULT - LIVES WITH, PROFILE
spouse/children/Pt lives in a house with steps to manage. Pt has a bathtub with a shower chair and grab bar.

## 2018-04-02 NOTE — PHYSICAL THERAPY INITIAL EVALUATION ADULT - PATIENT PROFILE REVIEW, REHAB EVAL
ACTIVITY: OOB with Assistance; spoke with RUTHIE Javier I prior to PT evaluation--> Pt OK for PT consult/yes

## 2018-04-02 NOTE — PHYSICAL THERAPY INITIAL EVALUATION ADULT - PERTINENT HX OF CURRENT PROBLEM, REHAB EVAL
58 y/o Stevenson speaking female with a PmHx of HTN, HLD, DM, presented to the [facility] ED c/o slurred speech. Pt is being admitted to telemetry for subacute/acute infarct noted on MRI.

## 2018-04-02 NOTE — OCCUPATIONAL THERAPY INITIAL EVALUATION ADULT - PERTINENT HX OF CURRENT PROBLEM, REHAB EVAL
58 y/o F with a PmHx of HTN, HLD, DM, presented to the [facility] ED c/o slurred speech. Pt's son states on 4/1, they all had finished eating dinner and she was washing dishes when she was having trouble turning off the faucet and then began to have slurred and slowed speech with an unsteady gait so they called 911. MRI/MRA Head/Neck: small acute/subacute infarcts within the right corona radiata extending into the right insular cortex and right frontal operculum with associated cytotoxic edema.

## 2018-04-02 NOTE — PROGRESS NOTE ADULT - ATTENDING COMMENTS
Plan as above per resident note; in addition to that I spoke to the patient and her family in detail regarding stroke symptoms and stroke workup. My suspicion is that this is embolic stroke and therefore I loop recorder for detection of paroxysmal atrial fibrillation is necessary.
Patient medically optimized for discharge.  Discharge planning 40 minutes - discussed with patient and consultants.

## 2018-04-02 NOTE — PROGRESS NOTE ADULT - ASSESSMENT
57 Y F with PMH of CAD, DM, HTN presented to ED after episode of slurred speech at home. As per family, water was coming out of her mouth when she was trying to speak. Her NIHSS was 0 and MRS is 0. Her symptoms resolved after 30 minutes. She felt weakness overall. She denied weakness, numbness, headache and change in vision currently. Her speech is back to baseline.     Impression: acute R MCA infarct secondary to ESUS    Plan:  - loop recorder as outpatient 57 Y F with PMH of CAD, DM, HTN presented to ED after episode of slurred speech at home. As per family, water was coming out of her mouth when she was trying to speak. Her NIHSS was 0 and MRS is 0. Her symptoms resolved after 30 minutes. She felt weakness overall. She denied weakness, numbness, headache and change in vision currently. Her speech is back to baseline.     Impression: acute R MCA infarct secondary to ESUS    Plan:  - loop recorder as outpatient  - outpatient followup with vascular neurology

## 2018-04-02 NOTE — DISCHARGE NOTE ADULT - HOSPITAL COURSE
57F with acute CVA with brain edema complicated by DM type 2, HTN.     Problem/Plan - 1:  ·  Problem: Cerebrovascular accident (CVA), unspecified mechanism.  Plan: With brain edema.  Improved symptoms today.  ASA, lipitor.  Lovenox SC for DVT prophylaxis.  PT/OT/PMR eval.  Appreciate neuro and vasc med consult. Will need NESSA and ILD but can be done as outpatient.      Problem/Plan - 2:  ·  Problem: Type 2 diabetes mellitus with complication, unspecified long term insulin use status.  Plan: FS QID, NISS. Resume Diltiazem.      Problem/Plan - 3:  ·  Problem: Essential hypertension.  Plan: Monitor BP.  resume Diltiazem.     Attending Attestation:   Patient medically optimized for discharge.  Discharge planning 40 minutes - discussed with patient and consultants. 57F with acute CVA with brain edema complicated by DM type 2, HTN.     Problem/Plan - 1:  ·  Problem: Cerebrovascular accident (CVA), unspecified mechanism.  Plan: With brain edema.  Improved symptoms today.  ASA, lipitor.  Lovenox SC for DVT prophylaxis.  PT/OT/PMR eval.  Appreciate neuro and vasc med consult. Will need NESSA and ILD but can be done as outpatient.      Problem/Plan - 2:  ·  Problem: Type 2 diabetes mellitus with complication, unspecified long term insulin use status.  Plan: FS QID, NISS. Resume Diltiazem.      Problem/Plan - 3:  ·  Problem: Essential hypertension.  Plan: Monitor BP.  resume Diltiazem.      Attending Attestation:   Patient medically optimized for discharge.  Discharge planning 40 minutes - discussed with patient and consultants. 57F with acute CVA with brain edema complicated by DM type 2, HTN.     Problem/Plan - 1:  ·  Problem: Cerebrovascular accident (CVA), unspecified mechanism.  Plan: With brain edema - improved. Continue ASA, lipitor.  Appreciate neuro and vasc med consult. Will need NESSA and ILD but can be done as outpatient.      Problem/Plan - 2:  ·  Problem: Type 2 diabetes mellitus with complication, unspecified long term insulin use status.  Plan: FS QID, NISS. Resume Metformin.      Problem/Plan - 3:  ·  Problem: Essential hypertension.  Plan: Monitor BP.  resume Diltiazem.

## 2018-04-02 NOTE — CONSULT NOTE ADULT - SUBJECTIVE AND OBJECTIVE BOX
Cardiology/Vascular Medicine Inpatient Consultation Note    Date of Admission:        CHIEF COMPLAINT:        HISTORY OF PRESENT ILLNESS:  HPI:  56 y/o Stevenson speaking female (translation done via telephone with son - pt's son states he was having trouble understanding her because she wasn't making sense and her dialect of [place] has changed since the stroke), with a PmHx of HTN, HLD, DM, presented to the [facility] ED c/o slurred speech. Pt's son states at about 2030hrs last night, they all had finished eating dinner and she was washing dishes when she was having trouble turning off the faucet and then began to have slurred and slowed speech with an unsteady gait so they called 911. In the ED, a code stroke was called and was seen by [facility] and had an NIHSS of 0 and an MRS of 0. She was placed in CDU for further observation. While in the CDU, she had a CTA head/neck that were done and was negative. She then had an MRI/MRA Head/Neck that were done that showed small acute/subacute infarcts within the right corona radiata extending into the right insular cortex and right frontal operculum with associated cytotoxic edema. No hemorrhagic transformation. Pt's son stated his mother denied any fever, chills, chest pain sob, HA, blurred vision, n/v. She was only c/o feeling a little dizzy and cold. She appears comfortable at this time. She was then admitted to telemetry for further medical treatment and management. (01 Apr 2018 12:12)          Allergies    No Known Allergies    Intolerances    	    MEDICATIONS:  aspirin enteric coated 81 milliGRAM(s) Oral daily  diltiazem    Tablet 90 milliGRAM(s) Oral two times a day  enoxaparin Injectable 40 milliGRAM(s) SubCutaneous every 24 hours        acetaminophen   Tablet. 650 milliGRAM(s) Oral every 6 hours PRN      atorvastatin 80 milliGRAM(s) Oral at bedtime  dextrose 50% Injectable 12.5 Gram(s) IV Push once  dextrose 50% Injectable 25 Gram(s) IV Push once  dextrose 50% Injectable 25 Gram(s) IV Push once  dextrose Gel 1 Dose(s) Oral once PRN  glucagon  Injectable 1 milliGRAM(s) IntraMuscular once PRN  insulin lispro (HumaLOG) corrective regimen sliding scale   SubCutaneous three times a day before meals  insulin lispro (HumaLOG) corrective regimen sliding scale   SubCutaneous at bedtime    dextrose 5%. 1000 milliLiter(s) IV Continuous <Continuous>      PAST MEDICAL & SURGICAL HISTORY:  Hyperlipidemia  Hypertension  CAD (coronary artery disease)  DM (diabetes mellitus)  History of hysterectomy      FAMILY HISTORY:  No pertinent family history in first degree relatives      SOCIAL HISTORY:    [ ] Non-smoker  [ ] Smoker  [ ] Alcohol    REVIEW OF SYSTEMS:  CONSTITUTIONAL: No fever, weight loss, or fatigue  EYES: No eye pain, visual disturbances, or discharge  ENMT:  No difficulty hearing, tinnitus, vertigo; No sinus or throat pain  NECK: No pain or stiffness  RESPIRATORY: No cough, wheezing, chills or hemoptysis; No Shortness of Breath  CARDIOVASCULAR: No chest pain, palpitations, passing out, dizziness, or leg swelling  GASTROINTESTINAL: No abdominal or epigastric pain. No nausea, vomiting, or hematemesis; No diarrhea or constipation. No melena or hematochezia.  GENITOURINARY: No dysuria, frequency, hematuria, or incontinence  NEUROLOGICAL: No headaches, memory loss, loss of strength, numbness, or tremors  SKIN: No itching, burning, rashes, or lesions   LYMPH Nodes: No enlarged glands  ENDOCRINE: No heat or cold intolerance; No hair loss  MUSCULOSKELETAL: No joint pain or swelling; No muscle, back, or extremity pain  PSYCHIATRIC: No depression, anxiety, mood swings, or difficulty sleeping  HEME/LYMPH: No easy bruising, or bleeding gums  ALLERY AND IMMUNOLOGIC: No hives or eczema	    [ ] All others negative	  [ ] Unable to obtain    PHYSICAL EXAM:  T(C): 37 (04-02-18 @ 04:47), Max: 37 (04-02-18 @ 04:47)  HR: 81 (04-02-18 @ 04:47) (81 - 86)  BP: 157/94 (04-02-18 @ 04:47) (144/85 - 157/94)  RR: 17 (04-02-18 @ 04:47) (16 - 17)  SpO2: 100% (04-02-18 @ 04:47) (100% - 100%)  Wt(kg): --  I&O's Summary      Appearance: Normal	  HEENT:   Normal oral mucosa, PERRL, EOMI	  Lymphatic: No lymphadenopathy  Cardiovascular: Normal S1 S2, No JVD, No murmurs, No edema  Respiratory: Lungs clear to auscultation	  Psychiatry: A & O x 3, Mood & affect appropriate  Gastrointestinal:  Soft, Non-tender, + BS	  Skin: No rashes, No ecchymoses, No cyanosis	  Neurologic: Non-focal  Extremities: Normal range of motion, No clubbing, cyanosis or edema  Vascular: Peripheral pulses palpable 2+ bilaterally      LABS:	 	    CBC Full  -  ( 02 Apr 2018 06:44 )  WBC Count : 7.97 K/uL  Hemoglobin : 12.6 g/dL  Hematocrit : 39.4 %  Platelet Count - Automated : 264 K/uL  Mean Cell Volume : 77.3 fL  Mean Cell Hemoglobin : 24.7 pg  Mean Cell Hemoglobin Concentration : 32.0 %  Auto Neutrophil # : x  Auto Lymphocyte # : x  Auto Monocyte # : x  Auto Eosinophil # : x  Auto Basophil # : x  Auto Neutrophil % : x  Auto Lymphocyte % : x  Auto Monocyte % : x  Auto Eosinophil % : x  Auto Basophil % : x    04-02    138  |  97<L>  |  10  ----------------------------<  138<H>  3.9   |  27  |  0.51  04-01    138  |  100  |  9   ----------------------------<  197<H>  3.6   |  27  |  0.54    Ca    9.0      02 Apr 2018 06:44  Ca    9.1      01 Apr 2018 12:00  Mg     1.8     03-31    TPro  7.9  /  Alb  4.1  /  TBili  0.3  /  DBili  x   /  AST  22  /  ALT  23  /  AlkPhos  106  03-31    < from: MR Angio Head No Cont (04.01.18 @ 10:21) >  EXAM:  MR ANGIO BRAIN      EXAM:  MR ANGIO NECK IC      EXAM:  MR BRAIN        PROCEDURE DATE:  Apr 1 2018         INTERPRETATION:  .    CLINICAL INFORMATION: Stroke. Hypersalivation and aphasia. Generalized   weakness.    TECHNIQUE: Multiplanar multi sequential MRI examination of the brain was   performed without the administration of IV gadolinium. MRA images through   the neck and [place] were obtained using a combination of 2-D   and 3-D time-of-flight acquisition. Post contrast MR angiography of the   neck was also performed. The data was then reformatted into a volumetric   data set and reviewed as rotational MIP images. 7 cc's of IV Gadavist was   administered without immediate complication. 1.5 cc's was discarded.    COMPARISON: There are no comparison MRIs. Comparison is made with prior   CT angiogram examinations of the head and neck from 3/31/2018. Comparison   is also made to a prior noncontrast head CT examination from 3/31/2018.    FINDINGS:    MRI Brain: Small areas of restricted diffusion are noted within the right   corona radiata extending to the right insular cortex and right frontal   operculum. There is associated T2/FLAIR hyperintense signal within the   same distribution compatible with cytotoxic edema. No hemorrhagic   transformation is notable.    Otherwise, the brain parenchyma appears unremarkable in signal and   morphology. There is no abnormal brain parenchymal or leptomeningeal   enhancement.    Ventricular size and configuration is unremarkable. No abnormal   extra-axial fluid collections are noted. Flow-voids are noted throughout   the major intracranial vessels, on the T2 weighted images, consistent   with their patency. The sella turcica and posterior fossa are   unremarkable.    The paranasal sinuses and mastoid air cells are clear. There is a tiny   left frontal outer table calvarial osteoma. The orbits appear   unremarkable.    MRA Neck: There is a standard anatomic configuration to the aortic arch.   The origins of the great vessels appear unremarkable. The bilateral   common carotid arteries, carotid bifurcations, and cervical internal   carotid arteries appear unremarkable.    The left cervical vertebral artery is dominant compared to the right. The   cervical vertebral arteries appear unremarkable.    MRA [place]: There is mild hypoplasia of the left A1 segment.   Otherwise, the bilateral intracranial internal carotid, anterior, and   middle cerebral arteries appear unremarkable.    The anterior communicating artery is notable. The bilateral posterior   communicating arteries are not well resolved.    The left V4 segment is dominant compared to the right. The   vertebrobasilar confluence, basilar artery, and basilar tip appear   unremarkable as well as the bilateral posterior cerebral arteries.    IMPRESSION:    Brain MRI: Small acute/subacute infarcts within the right corona radiata   extending into the right insular cortex and right frontal operculum with   associated cytotoxic edema. No hemorrhagic transformation.    MRA head and neck: Unremarkable examinations with anatomic variants, as   described.        DORINA POTTS M.D., ATTENDING RADIOLOGIST  This document has been electronically signed. Apr 1 2018 10:36AM            < from: CT Angio Neck w/ IV Cont (03.31.18 @ 22:41) >    EXAM:  CT ANGIO NECK (W)AW IC      EXAM:  CT ANGIO BRAIN (W)AW IC        PROCEDURE DATE:  Mar 31 2018         INTERPRETATION:  CLINICAL INFORMATION: Stroke.     TECHNIQUE: Noncontrast axial CT images were acquired through the head.   Two-dimensional sagittal and coronal reformats were generated. Contrast   enhanced axial CT images were acquired from the aortic arch to the vertex   of the calvarium, during the angiographic phase. Three-dimensional   maximum intensity projection reformats were generated.  90 cc of   Omnipaque-350 mg/ml were administered intravenously, without immediate   complication.    COMPARISON: None.    FINDINGS:   NONCONTRAST CT BRAIN:  There is no CT evidence of acute intracranial hemorrhage, extra-axial   collection, vasogenic edema, mass effect, midline shift, central   herniation, hydrocephalus or transcortical infarct.     The ventricles and sulci are normal in size and configuration.     The visualized paranasal sinuses are clear.    The mastoid air cells and middle ear cavities are grossly clear.    The calvarium, skull base, visualized facial bones and regional soft   tissues are grossly unremarkable.      CT ANGIOGRAPHY NECK:  There is no evidence for significant stenosis or major vessel occlusion   involving the bilateral carotid arteries.    There is no evidence for significant stenosis or major vessel occlusion   involving the bilateral vertebral arteries. Hypoplastic right vertebral   artery.    There is a 3 mm nodule in the left upper lobe.    Visualized osseous structures are unremarkable.      CT ANGIOGRAPHY BRAIN:  There is no evidence for significant stenosis, major vessel occlusion, or   aneurysm about the [place].     There is no evidence for significant stenosis, major vessel occlusion, or   aneurysm involving the vertebrobasilar system.    No enlarged vascular lesions or clusters of abnormal vessels are noted to   suggest an arterial venous malformation within the field-of-view.    Visualized portions of the superficialand deep venous systems are   unremarkable.      IMPRESSION:     CT angiography neck:   No evidence of hemodynamically significant stenosis using NASCET   criteria.  Patent vertebral arteries.  No evidence of vascular dissection.    CT angiography brain: No major vessel occlusion or proximal stenosis.        MASTER ORDOÑEZ M.D., RADIOLOGY RESIDENT  This document has been electronically signed.  KARMA GAMBLE M.D., ATTENDING RADIOLOGIST  This document has been electronically signed. Apr 1 2018 12:02AM            < from: Transthoracic Echocardiogram (04.01.18 @ 07:56) >    Patient name: LUIS WORKMAN  YOB: 1960   Age: 57 (F)   MR#: [id number]  Study Date: 4/1/2018  Location: O/PSonographer: [facility]  Study quality: Technically good  Referring Physician: Not Available Doctor, MD  Blood Pressure: 160/86 mmHg  Height: 157 cm  Weight: 64 kg  BSA: 1.6 m2  ------------------------------------------------------------------------  PROCEDURE: Transthoracic echocardiogram with 2-D, M-Mode  and complete spectral and color flow Doppler.  INDICATION: Unstable angina (I20.0)  ------------------------------------------------------------------------  DIMENSIONS:  Dimensions:     Normal Values:  LA:     2.3 cm    2.0 - 4.0 cm  Ao:     2.8 cm    2.0 - 3.8 cm  SEPTUM: 0.8 cm    0.6 - 1.2 cm  PWT:    0.9 cm    0.6 - 1.1 cm  LVIDd:  3.8 cm    3.0 - 5.6 cm  LVIDs:  2.6 cm    1.8 - 4.0 cm  Derived Variables:  LVMI: 57 g/m2  RWT: 0.47  Fractional short: 32 %  Ejection Fraction (Teicholtz): 60 %  ------------------------------------------------------------------------  OBSERVATIONS:  Mitral Valve: Normal mitral valve.  Aortic Root: Normal aortic root.  Aortic Valve: Normal trileaflet aortic valve.  Left Atrium: Normal left atrium.  LA volume index = 20  cc/m2.  Left Ventricle: Normal left ventricular systolic function.  No segmental wall motion abnormalities. Normal left  ventricular internal dimensions and wall thicknesses. Mild  diastolic dysfunction (Stage I).  Right Heart: Normal right atrium. Normal right ventricular  size and function. Normal tricuspid valve. Mild tricuspid  regurgitation. Pulmonic valve not well visualized.  Pericardium/PleuraNormal pericardium with no pericardial  effusion.  Hemodynamic: Estimated right ventricular systolic pressure  equals 34 mm Hg, assuming right atrial pressure equals 10  mm Hg, consistent with normal pulmonary pressures.  ------------------------------------------------------------------------  CONCLUSIONS:  1. Normal left ventricular internal dimensions and wall  thicknesses.  2. Normal left ventricular systolic function. No segmental  wall motion abnormalities.  3. Mild diastolic dysfunction (Stage I).  4. Normal right ventricular size and function.  5. Normal tricuspid valve. Mild tricuspid regurgitation.  6. Estimated pulmonary artery systolicpressure equals 34  mm Hg, assuming right atrial pressure equals 10  mm Hg,  consistent with normal pulmonary pressures.  *** No previous Echo exam.  ------------------------------------------------------------------------  Confirmed on  4/1/2018 - 11:50:03 by John Paul Turk MD, [facility],  EULA, ZANA  ------------------------------------------------------------------------    < end of copied text > Cardiology/Vascular Medicine Inpatient Consultation Note    Patient seen earlier today during afternoon rounds.    HISTORY OF PRESENT ILLNESS:  Patient is a 56 yo [place] speaking female (translation done via telephone with son - pt's son states he was having trouble understanding her because she wasn't making sense and her dialect of [place] has changed since the stroke), with a PmHx of HTN, HLD, DM, presented to the [facility] ED c/o slurred speech. Pt's son states at about 2030hrs last night, they all had finished eating dinner and she was washing dishes when she was having trouble turning off the faucet and then began to have slurred and slowed speech with an unsteady gait so they called 911. In the ED, a code stroke was called and was seen by [facility] and had an NIHSS of 0 and an MRS of 0. She was placed in CDU for further observation. While in the CDU, she had a CTA head/neck that were done and was negative. She then had an MRI/MRA Head/Neck that were done that showed small acute/subacute infarcts within the right corona radiata extending into the right insular cortex and right frontal operculum with associated cytotoxic edema. No hemorrhagic transformation. Pt's son stated his mother denied any fever, chills, chest pain sob, HA, blurred vision, n/v. She was only c/o feeling a little dizzy and cold. She appears comfortable at this time. She was then admitted to telemetry for further medical treatment and management. (01 Apr 2018 12:12)    At the time of my exam, the patient appeared comfortable without obvious neurologic deficits  No new events noted on telemetry  Will discharge home today and have her return to see me in the office for further cardiovascular evaluation.      Allergies  No Known Allergies    MEDICATIONS:  aspirin enteric coated 81 milliGRAM(s) Oral daily  diltiazem    Tablet 90 milliGRAM(s) Oral two times a day  enoxaparin Injectable 40 milliGRAM(s) SubCutaneous every 24 hours  acetaminophen   Tablet. 650 milliGRAM(s) Oral every 6 hours PRN  atorvastatin 80 milliGRAM(s) Oral at bedtime  dextrose 50% Injectable 12.5 Gram(s) IV Push once  dextrose 50% Injectable 25 Gram(s) IV Push once  dextrose 50% Injectable 25 Gram(s) IV Push once  dextrose Gel 1 Dose(s) Oral once PRN  glucagon  Injectable 1 milliGRAM(s) IntraMuscular once PRN  insulin lispro (HumaLOG) corrective regimen sliding scale   SubCutaneous three times a day before meals  insulin lispro (HumaLOG) corrective regimen sliding scale   SubCutaneous at bedtime  dextrose 5%. 1000 milliLiter(s) IV Continuous <Continuous>      PAST MEDICAL & SURGICAL HISTORY:  Hyperlipidemia  Hypertension  CAD (coronary artery disease)  DM (diabetes mellitus)  History of hysterectomy      FAMILY HISTORY:  No pertinent family history in first degree relatives      SOCIAL HISTORY:    [place]    REVIEW OF SYSTEMS:  CONSTITUTIONAL: No fever, weight loss, or fatigue  EYES: No eye pain, visual disturbances, or discharge  ENMT:  No difficulty hearing, tinnitus, vertigo; No sinus or throat pain  NECK: No pain or stiffness  RESPIRATORY: No cough, wheezing, chills or hemoptysis; No Shortness of Breath  CARDIOVASCULAR: No chest pain, palpitations, passing out, dizziness, or leg swelling  GASTROINTESTINAL: No abdominal or epigastric pain. No nausea, vomiting, or hematemesis; No diarrhea or constipation. No melena or hematochezia.  GENITOURINARY: No dysuria, frequency, hematuria, or incontinence  NEUROLOGICAL: No headaches, memory loss, loss of strength, numbness, or tremors  SKIN: No itching, burning, rashes, or lesions   LYMPH Nodes: No enlarged glands  ENDOCRINE: No heat or cold intolerance; No hair loss  MUSCULOSKELETAL: No joint pain or swelling; No muscle, back, or extremity pain  PSYCHIATRIC: No depression, anxiety, mood swings, or difficulty sleeping  HEME/LYMPH: No easy bruising, or bleeding gums  ALLERY AND IMMUNOLOGIC: No hives or eczema	      PHYSICAL EXAM:  T(C): 37 (04-02-18 @ 04:47), Max: 37 (04-02-18 @ 04:47)  HR: 81 (04-02-18 @ 04:47) (81 - 86)  BP: 157/94 (04-02-18 @ 04:47) (144/85 - 157/94)  RR: 17 (04-02-18 @ 04:47) (16 - 17)  SpO2: 100% (04-02-18 @ 04:47) (100% - 100%)      Appearance: NAD  HEENT:   Normal oral mucosa, PERRL, EOMI	  Lymphatic: No lymphadenopathy  Cardiovascular: Normal S1 S2, No JVD, No murmurs, No edema  Respiratory: Lungs clear to auscultation	  Psychiatry: Awake, alert  Gastrointestinal:  Soft, Non-tender, + BS	  Skin: No rashes, No ecchymoses, No cyanosis	  Neurologic: Non-focal  Extremities: Normal range of motion, No clubbing, cyanosis or edema  Vascular: Peripheral pulses palpable 2+ bilaterally      LABS:	 	                          12.6   7.97  )-----------( 264      ( 02 Apr 2018 06:44 )             39.4     04-02    138  |  97<L>  |  10  ----------------------------<  138<H>  3.9   |  27  |  0.51  04-01    138  |  100  |  9   ----------------------------<  197<H>  3.6   |  27  |  0.54    Ca    9.0      02 Apr 2018 06:44  Ca    9.1      01 Apr 2018 12:00  Mg     1.8     03-31    TPro  7.9  /  Alb  4.1  /  TBili  0.3  /  DBili  x   /  AST  22  /  ALT  23  /  AlkPhos  106  03-31    < from: MR Angio Head No Cont (04.01.18 @ 10:21) >  EXAM:  MR ANGIO BRAIN      EXAM:  MR ANGIO NECK IC      EXAM:  MR BRAIN        PROCEDURE DATE:  Apr 1 2018         INTERPRETATION:  .    CLINICAL INFORMATION: Stroke. Hypersalivation and aphasia. Generalized   weakness.    TECHNIQUE: Multiplanar multi sequential MRI examination of the brain was   performed without the administration of IV gadolinium. MRA images through   the neck and [place] were obtained using a combination of 2-D   and 3-D time-of-flight acquisition. Post contrast MR angiography of the   neck was also performed. The data was then reformatted into a volumetric   data set and reviewed as rotational MIP images. 7 cc's of IV Gadavist was   administered without immediate complication. 1.5 cc's was discarded.    COMPARISON: There are no comparison MRIs. Comparison is made with prior   CT angiogram examinations of the head and neck from 3/31/2018. Comparison   is also made to a prior noncontrast head CT examination from 3/31/2018.    FINDINGS:    MRI Brain: Small areas of restricted diffusion are noted within the right   corona radiata extending to the right insular cortex and right frontal   operculum. There is associated T2/FLAIR hyperintense signal within the   same distribution compatible with cytotoxic edema. No hemorrhagic   transformation is notable.    Otherwise, the brain parenchyma appears unremarkable in signal and   morphology. There is no abnormal brain parenchymal or leptomeningeal   enhancement.    Ventricular size and configuration is unremarkable. No abnormal   extra-axial fluid collections are noted. Flow-voids are noted throughout   the major intracranial vessels, on the T2 weighted images, consistent   with their patency. The sella turcica and posterior fossa are   unremarkable.    The paranasal sinuses and mastoid air cells are clear. There is a tiny   left frontal outer table calvarial osteoma. The orbits appear   unremarkable.    MRA Neck: There is a standard anatomic configuration to the aortic arch.   The origins of the great vessels appear unremarkable. The bilateral   common carotid arteries, carotid bifurcations, and cervical internal   carotid arteries appear unremarkable.    The left cervical vertebral artery is dominant compared to the right. The   cervical vertebral arteries appear unremarkable.    MRA [place]: There is mild hypoplasia of the left A1 segment.   Otherwise, the bilateral intracranial internal carotid, anterior, and   middle cerebral arteries appear unremarkable.    The anterior communicating artery is notable. The bilateral posterior   communicating arteries are not well resolved.    The left V4 segment is dominant compared to the right. The   vertebrobasilar confluence, basilar artery, and basilar tip appear   unremarkable as well as the bilateral posterior cerebral arteries.    IMPRESSION:    Brain MRI: Small acute/subacute infarcts within the right corona radiata   extending into the right insular cortex and right frontal operculum with   associated cytotoxic edema. No hemorrhagic transformation.    MRA head and neck: Unremarkable examinations with anatomic variants, as   described.        DORINA POTTS M.D., ATTENDING RADIOLOGIST  This document has been electronically signed. Apr 1 2018 10:36AM            < from: CT Angio Neck w/ IV Cont (03.31.18 @ 22:41) >    EXAM:  CT ANGIO NECK (W)AW IC      EXAM:  CT ANGIO BRAIN (W)AW IC        PROCEDURE DATE:  Mar 31 2018         INTERPRETATION:  CLINICAL INFORMATION: Stroke.     TECHNIQUE: Noncontrast axial CT images were acquired through the head.   Two-dimensional sagittal and coronal reformats were generated. Contrast   enhanced axial CT images were acquired from the aortic arch to the vertex   of the calvarium, during the angiographic phase. Three-dimensional   maximum intensity projection reformats were generated.  90 cc of   Omnipaque-350 mg/ml were administered intravenously, without immediate   complication.    COMPARISON: None.    FINDINGS:   NONCONTRAST CT BRAIN:  There is no CT evidence of acute intracranial hemorrhage, extra-axial   collection, vasogenic edema, mass effect, midline shift, central   herniation, hydrocephalus or transcortical infarct.     The ventricles and sulci are normal in size and configuration.     The visualized paranasal sinuses are clear.    The mastoid air cells and middle ear cavities are grossly clear.    The calvarium, skull base, visualized facial bones and regional soft   tissues are grossly unremarkable.      CT ANGIOGRAPHY NECK:  There is no evidence for significant stenosis or major vessel occlusion   involving the bilateral carotid arteries.    There is no evidence for significant stenosis or major vessel occlusion   involving the bilateral vertebral arteries. Hypoplastic right vertebral   artery.    There is a 3 mm nodule in the left upper lobe.    Visualized osseous structures are unremarkable.      CT ANGIOGRAPHY BRAIN:  There is no evidence for significant stenosis, major vessel occlusion, or   aneurysm about the [place].     There is no evidence for significant stenosis, major vessel occlusion, or   aneurysm involving the vertebrobasilar system.    No enlarged vascular lesions or clusters of abnormal vessels are noted to   suggest an arterial venous malformation within the field-of-view.    Visualized portions of the superficialand deep venous systems are   unremarkable.      IMPRESSION:     CT angiography neck:   No evidence of hemodynamically significant stenosis using NASCET   criteria.  Patent vertebral arteries.  No evidence of vascular dissection.    CT angiography brain: No major vessel occlusion or proximal stenosis.        MASTER ORDOÑEZ M.D., RADIOLOGY RESIDENT  This document has been electronically signed.  KARMA GAMBLE M.D., ATTENDING RADIOLOGIST  This document has been electronically signed. Apr 1 2018 12:02AM            < from: Transthoracic Echocardiogram (04.01.18 @ 07:56) >    Patient name: LUIS WORKMAN  YOB: 1960   Age: 57 (F)   MR#: [id number]  Study Date: 4/1/2018  Location: O/PSonographer: Sudha Morales  Study quality: Technically good  Referring Physician: Not Available Doctor, MD  Blood Pressure: 160/86 mmHg  Height: 157 cm  Weight: 64 kg  BSA: 1.6 m2  ------------------------------------------------------------------------  PROCEDURE: Transthoracic echocardiogram with 2-D, M-Mode  and complete spectral and color flow Doppler.  INDICATION: Unstable angina (I20.0)  ------------------------------------------------------------------------  DIMENSIONS:  Dimensions:     Normal Values:  LA:     2.3 cm    2.0 - 4.0 cm  Ao:     2.8 cm    2.0 - 3.8 cm  SEPTUM: 0.8 cm    0.6 - 1.2 cm  PWT:    0.9 cm    0.6 - 1.1 cm  LVIDd:  3.8 cm    3.0 - 5.6 cm  LVIDs:  2.6 cm    1.8 - 4.0 cm  Derived Variables:  LVMI: 57 g/m2  RWT: 0.47  Fractional short: 32 %  Ejection Fraction (Teicholtz): 60 %  ------------------------------------------------------------------------  OBSERVATIONS:  Mitral Valve: Normal mitral valve.  Aortic Root: Normal aortic root.  Aortic Valve: Normal trileaflet aortic valve.  Left Atrium: Normal left atrium.  LA volume index = 20  cc/m2.  Left Ventricle: Normal left ventricular systolic function.  No segmental wall motion abnormalities. Normal left  ventricular internal dimensions and wall thicknesses. Mild  diastolic dysfunction (Stage I).  Right Heart: Normal right atrium. Normal right ventricular  size and function. Normal tricuspid valve. Mild tricuspid  regurgitation. Pulmonic valve not well visualized.  Pericardium/PleuraNormal pericardium with no pericardial  effusion.  Hemodynamic: Estimated right ventricular systolic pressure  equals 34 mm Hg, assuming right atrial pressure equals 10  mm Hg, consistent with normal pulmonary pressures.  ------------------------------------------------------------------------  CONCLUSIONS:  1. Normal left ventricular internal dimensions and wall  thicknesses.  2. Normal left ventricular systolic function. No segmental  wall motion abnormalities.  3. Mild diastolic dysfunction (Stage I).  4. Normal right ventricular size and function.  5. Normal tricuspid valve. Mild tricuspid regurgitation.  6. Estimated pulmonary artery systolicpressure equals 34  mm Hg, assuming right atrial pressure equals 10  mm Hg,  consistent with normal pulmonary pressures.  *** No previous Echo exam.  ------------------------------------------------------------------------  Confirmed on  4/1/2018 - 11:50:03 by John Paul Turk MD, [facility],  EULA, LOWELLVI  ------------------------------------------------------------------------    < end of copied text >

## 2018-04-02 NOTE — DISCHARGE NOTE ADULT - CARE PROVIDER_API CALL
Pierre Woo), Neurology  [address] [place] [address]  Phone: [phone]  Fax: [phone] Pierre Woo (MD), Neurology  [address] [place] [address]  Phone: [phone]  Fax: [phone]    John Paul Turk (MD; PhD), Cardiology; Internal Medicine; Vascular Medicine  [address] [place] [address] [address] [place] [address]  Phone: [phone]  Fax: [phone] John Paul Turk (MD; PhD), Cardiology; Internal Medicine; Vascular Medicine  [address] [address] [place] [address]  [address]   [place] [address]  Phone: [phone]  Fax: [phone]    Kale Avelar (FARRUKH), Neurology; Vascular Neurology  [address] [facility] [address]  [place] [address]  Phone: [phone]  Fax: [phone]

## 2018-04-02 NOTE — PROGRESS NOTE ADULT - SUBJECTIVE AND OBJECTIVE BOX
Interval History - Patient seen and examined at bedside.  No acute issues.        Subjective:  Patient reports continued symptoms in face    MEDICATIONS  (STANDING):  aspirin enteric coated 81 milliGRAM(s) Oral daily  atorvastatin 80 milliGRAM(s) Oral at bedtime  dextrose 5%. 1000 milliLiter(s) (50 mL/Hr) IV Continuous <Continuous>  dextrose 50% Injectable 12.5 Gram(s) IV Push once  dextrose 50% Injectable 25 Gram(s) IV Push once  dextrose 50% Injectable 25 Gram(s) IV Push once  enoxaparin Injectable 40 milliGRAM(s) SubCutaneous every 24 hours  insulin lispro (HumaLOG) corrective regimen sliding scale   SubCutaneous three times a day before meals  insulin lispro (HumaLOG) corrective regimen sliding scale   SubCutaneous at bedtime    MEDICATIONS  (PRN):  acetaminophen   Tablet. 650 milliGRAM(s) Oral every 6 hours PRN mild, moderate pain  dextrose Gel 1 Dose(s) Oral once PRN Blood Glucose LESS THAN 70 milliGRAM(s)/deciliter  glucagon  Injectable 1 milliGRAM(s) IntraMuscular once PRN Glucose LESS THAN 70 milligrams/deciliter      Allergies    No Known Allergies    Intolerances        Objective:   Vital Signs Last 24 Hrs  T(C): 37 (02 Apr 2018 04:47), Max: 37.3 (01 Apr 2018 14:00)  T(F): 98.6 (02 Apr 2018 04:47), Max: 99.2 (01 Apr 2018 14:00)  HR: 81 (02 Apr 2018 04:47) (74 - 86)  BP: 157/94 (02 Apr 2018 04:47) (144/85 - 157/94)  BP(mean): --  RR: 17 (02 Apr 2018 04:47) (16 - 17)  SpO2: 100% (02 Apr 2018 04:47) (100% - 100%)    General Exam:   General appearance: No acute distress                   Neurological Exam:  Mental Status: Oriented to self, date and place.  Attention intact.  No dysarthria, aphasia or neglect.  Knowledge intact.  Registration intact.  Short and long term memory grossly intact.      Cranial Nerves: CN I - not tested.  PERRL, EOMI, VFF, no nystagmus or diplopia.  No APD.  Fundi not visualized bilaterally.  CN V1-3 intact to light touch and pinprick.  L facial weakness.  Hearing intact to finger rub bilaterally.  Tongue, uvula and palate midline.  Sternocleidomastoid and Trapezius intact bilaterally.    Motor:   Tone: normal.                  Strength: intact throughout  Pronator drift: none                 Dysmeria: None to finger-nose-finger or heel-shin-heel  No truncal ataxia.    Tremor: No resting, postural or action tremor.  No myoclonus.    Sensation: intact to light touch, pinprick, vibration and proprioception    Deep Tendon Reflexes: 1+ bilateral biceps, triceps, brachioradialis, knee and ankle  Toes flexor bilaterally    Gait: normal and stable.      Other:  CBC Full  -  ( 02 Apr 2018 06:44 )  WBC Count : 7.97 K/uL  Hemoglobin : 12.6 g/dL  Hematocrit : 39.4 %  Platelet Count - Automated : 264 K/uL  Mean Cell Volume : 77.3 fL  Mean Cell Hemoglobin : 24.7 pg  Mean Cell Hemoglobin Concentration : 32.0 %  Auto Neutrophil # : x  Auto Lymphocyte # : x  Auto Monocyte # : x  Auto Eosinophil # : x  Auto Basophil # : x  Auto Neutrophil % : x  Auto Lymphocyte % : x  Auto Monocyte % : x  Auto Eosinophil % : x  Auto Basophil % : x    04-02    138  |  97<L>  |  10  ----------------------------<  138<H>  3.9   |  27  |  0.51    Ca    9.0      02 Apr 2018 06:44  Mg     1.8     03-31    TPro  7.9  /  Alb  4.1  /  TBili  0.3  /  DBili  x   /  AST  22  /  ALT  23  /  AlkPhos  106  03-31    04-02    138  |  97<L>  |  10  ----------------------------<  138<H>  3.9   |  27  |  0.51    Ca    9.0      02 Apr 2018 06:44  Mg     1.8     03-31    TPro  7.9  /  Alb  4.1  /  TBili  0.3  /  DBili  x   /  AST  22  /  ALT  23  /  AlkPhos  106  03-31    LIVER FUNCTIONS - ( 31 Mar 2018 22:20 )  Alb: 4.1 g/dL / Pro: 7.9 g/dL / ALK PHOS: 106 u/L / ALT: 23 u/L / AST: 22 u/L / GGT: x             Imaging:
CC: F/U for CVA    SUBJECTIVE / OVERNIGHT EVENTS:  Overall improved.  No new complaints.  No F/C, N/V, CP, SOB, Cough, lightheadedness, dizziness, abdominal pain, diarrhea, dysuria.    MEDICATIONS  (STANDING):  aspirin enteric coated 81 milliGRAM(s) Oral daily  atorvastatin 80 milliGRAM(s) Oral at bedtime  dextrose 5%. 1000 milliLiter(s) (50 mL/Hr) IV Continuous <Continuous>  dextrose 50% Injectable 12.5 Gram(s) IV Push once  dextrose 50% Injectable 25 Gram(s) IV Push once  dextrose 50% Injectable 25 Gram(s) IV Push once  enoxaparin Injectable 40 milliGRAM(s) SubCutaneous every 24 hours  insulin lispro (HumaLOG) corrective regimen sliding scale   SubCutaneous three times a day before meals  insulin lispro (HumaLOG) corrective regimen sliding scale   SubCutaneous at bedtime    MEDICATIONS  (PRN):  acetaminophen   Tablet. 650 milliGRAM(s) Oral every 6 hours PRN mild, moderate pain  dextrose Gel 1 Dose(s) Oral once PRN Blood Glucose LESS THAN 70 milliGRAM(s)/deciliter  glucagon  Injectable 1 milliGRAM(s) IntraMuscular once PRN Glucose LESS THAN 70 milligrams/deciliter      Vital Signs Last 24 Hrs  T(C): 37 (2018 04:47), Max: 37.3 (2018 14:00)  T(F): 98.6 (2018 04:47), Max: 99.2 (2018 14:00)  HR: 81 (2018 04:47) (74 - 86)  BP: 157/94 (2018 04:47) (144/85 - 157/94)  BP(mean): --  RR: 17 (2018 04:47) (16 - 17)  SpO2: 100% (2018 04:47) (100% - 100%)  CAPILLARY BLOOD GLUCOSE      POCT Blood Glucose.: 186 mg/dL (2018 12:17)  POCT Blood Glucose.: 133 mg/dL (2018 08:57)  POCT Blood Glucose.: 131 mg/dL (2018 23:01)  POCT Blood Glucose.: 114 mg/dL (2018 17:44)    I&O's Summary      PHYSICAL EXAM:  GENERAL: NAD, well-developed  HEAD:  Atraumatic, Normocephalic  EYES: EOMI, PERRLA, conjunctiva and sclera clear  NECK: Supple, No JVD  CHEST/LUNG: Clear to auscultation bilaterally; No wheeze  HEART: Regular rate and rhythm; No murmurs, rubs, or gallops  ABDOMEN: Soft, Nontender, Nondistended; Bowel sounds present  EXTREMITIES:  2+ Peripheral Pulses, No clubbing, cyanosis, or edema  PSYCH: Calm  NEUROLOGY: A/Ox3, no obvious focal neuro deficits  SKIN: No rashes or lesions    LABS:                        12.6   7.97  )-----------( 264      ( 2018 06:44 )             39.4     04-02    138  |  97<L>  |  10  ----------------------------<  138<H>  3.9   |  27  |  0.51    Ca    9.0      2018 06:44  Mg     1.8     -    TPro  7.9  /  Alb  4.1  /  TBili  0.3  /  DBili  x   /  AST  22  /  ALT  23  /  AlkPhos  106  03-31    PT/INR - ( 31 Mar 2018 22:20 )   PT: 10.8 SEC;   INR: 0.97          PTT - ( 31 Mar 2018 22:20 )  PTT:30.4 SEC  CARDIAC MARKERS ( 31 Mar 2018 22:20 )  x     / < 0.06 ng/mL / 61 u/L / 1.00 ng/mL / x          Urinalysis Basic - ( 2018 12:05 )    Color: PALE YELLOW / Appearance: CLEAR / S.016 / pH: 7.0  Gluc: NEGATIVE / Ketone: NEGATIVE  / Bili: NEGATIVE / Urobili: NORMAL mg/dL   Blood: NEGATIVE / Protein: 10 mg/dL / Nitrite: NEGATIVE   Leuk Esterase: NEGATIVE / RBC: 0-2 / WBC 3-5   Sq Epi: OCC / Non Sq Epi: x / Bacteria: FEW        RADIOLOGY & ADDITIONAL TESTS:  < from: MR Head No Cont (18 @ 10:21) >  EXAM:  MR ANGIO BRAIN      EXAM:  MR ANGIO NECK IC      EXAM:  MR BRAIN        PROCEDURE DATE:  2018         INTERPRETATION:  .    CLINICAL INFORMATION: Stroke. Hypersalivation and aphasia. Generalized   weakness.    TECHNIQUE: Multiplanar multi sequential MRI examination of the brain was   performed without the administration of IV gadolinium. MRA images through   the neck and [place] were obtained using a combination of 2-D   and 3-D time-of-flight acquisition. Post contrast MR angiography of the   neck was also performed. The data was then reformatted into a volumetric   data set and reviewed as rotational MIP images. 7 cc's of IV Gadavist was   administered without immediate complication. 1.5 cc's was discarded.    COMPARISON: There are no comparison MRIs. Comparison is made with prior   CT angiogram examinations of the head and neck from 3/31/2018. Comparison   is also made to a prior noncontrast head CT examination from 3/31/2018.    FINDINGS:    MRI Brain: Small areas of restricted diffusion are noted within the right   corona radiata extending to the right insular cortex and right frontal   operculum. There is associated T2/FLAIR hyperintense signal within the   same distribution compatible with cytotoxic edema. No hemorrhagic   transformation is notable.    Otherwise, the brain parenchyma appears unremarkable in signal and   morphology. There is no abnormal brain parenchymal or leptomeningeal   enhancement.    Ventricular size and configuration is unremarkable. No abnormal   extra-axial fluid collections are noted. Flow-voids are noted throughout   the major intracranial vessels, on the T2 weighted images, consistent   with their patency. The sella turcica and posterior fossa are   unremarkable.    The paranasal sinuses and mastoid air cells are clear. There is a tiny   left frontal outer table calvarial osteoma. The orbits appear   unremarkable.    MRA Neck: There is a standard anatomic configuration to the aortic arch.   The origins of the great vessels appear unremarkable. The bilateral   common carotid arteries, carotid bifurcations, and cervical internal   carotid arteries appear unremarkable.    The left cervical vertebral artery is dominant compared to the right. The   cervical vertebral arteries appear unremarkable.    MRA [place]: There is mild hypoplasia of the left A1 segment.   Otherwise, the bilateral intracranial internal carotid, anterior, and   middle cerebral arteries appear unremarkable.    The anterior communicating artery is notable. The bilateral posterior   communicating arteries are not well resolved.    The left V4 segment is dominant compared to the right. The   vertebrobasilar confluence, basilar artery, and basilar tip appear   unremarkable as well as the bilateral posterior cerebral arteries.    IMPRESSION:    Brain MRI: Small acute/subacute infarcts within the right corona radiata   extending into the right insular cortex and right frontal operculum with   associated cytotoxic edema. No hemorrhagic transformation.    MRA head and neck: Unremarkable examinations with anatomic variants, as   described.                    DORINA POTTS M.D., ATTENDING RADIOLOGIST  This document has been electronically signed. 2018 10:36AM    < end of copied text >    Imaging Personally Reviewed:    Care Discussed with Consultants/Other Providers: Neurology - Dr. Avelar - discussion of stroke management. Vascular Medicine - Dr. Turk - discussion of stroke management.

## 2018-04-02 NOTE — PROGRESS NOTE ADULT - PROBLEM SELECTOR PLAN 1
With brain edema.  Improved symptoms today.  ASA, lipitor.  Lovenox SC for DVT prophylaxis.  PT/OT/PMR eval.  Appreciate neuro and vasc med consult. Will need NESSA and ILD but can be done as outpatient.

## 2018-04-02 NOTE — DISCHARGE NOTE ADULT - CARE PROVIDERS DIRECT ADDRESSES
,DirectAddress_Unknown ,DirectAddress_Unknown,[email or web address]

## 2018-04-02 NOTE — DISCHARGE NOTE ADULT - NS AS DC STROKE ED MATERIALS
Risk Factors for Stroke/Stroke Warning Signs and Symptoms/Call 911 for Stroke/Stroke Education Booklet/Prescribed Medications/Need for Followup After Discharge

## 2018-04-02 NOTE — DISCHARGE NOTE ADULT - MEDICATION SUMMARY - MEDICATIONS TO TAKE
I will START or STAY ON the medications listed below when I get home from the hospital:    acetaminophen 325 mg oral tablet  -- 2 tab(s) by mouth every 6 hours, As needed, mild, moderate pain  -- Indication: For Pain control     aspirin 81 mg oral delayed release tablet  -- 1 tab(s) by mouth once a day  -- Indication: For Stroke    dilTIAZem 90 mg/12 hours oral capsule, extended release  -- 1 cap(s) by mouth once a day  -- Indication: For Hypertension    metFORMIN 500 mg oral tablet  -- 1 tab(s) by mouth 2 times a day  -- Indication: For DM (diabetes mellitus)    atorvastatin 80 mg oral tablet  -- 1 tab(s) by mouth once a day (at bedtime)  -- Indication: For Hyperlipidemia    ferrous gluconate 324 mg (38 mg elemental iron) oral tablet  -- 1 tab(s) by mouth 2 times a day  -- Indication: For Anemia

## 2018-04-02 NOTE — DISCHARGE NOTE ADULT - PATIENT PORTAL LINK FT
You can access the [facility] Patient Portal, offered by [facility], by registering with the following website: [email or web address]

## 2018-04-02 NOTE — DISCHARGE NOTE ADULT - PLAN OF CARE
To help recover or improve as much as possible your sensory and motor abilities, to become more independent, and prevent future strokes. Continue physical therapy and skills learned for rehabilitation.  Follow up with your neurologist in 1-2 weeks for further medical management.  Continue to take your medications as prescribed, low salt, low fat, and diabetic diet.  Consider joining a support group for stroke survivors for emotional support to prevent depression. Low sodium and fat diet, continue anti-hypertensive medications, and follow up with primary care physician. Monitor finger sticks pre-meal and bedtime, low salt, fat and carbohydrate diet, minimize glucose intake.  Exercise daily for at least 30 minutes and weight loss.  Follow up with primary care physician and endocrinologist for routine Hemoglobin A1C checks and management.  Follow up with your ophthalmologist for routine yearly vision exams. Low fat diet, exercise daily and continue current medications. Follow up with primary care physician and cardiologist for management. Continue physical therapy and skills learned for rehabilitation.  Follow up with your neurologist in 1-2 weeks for further medical management.  Continue to take your medications as prescribed, low salt, low fat, and diabetic diet.  Consider joining a support group for stroke survivors for emotional support to prevent depression. Information provided below for Dr. Turk, please call for an appointment to help coordinate loop recorder and transesophageal echocardiogram.

## 2018-04-02 NOTE — OCCUPATIONAL THERAPY INITIAL EVALUATION ADULT - GENERAL OBSERVATIONS, REHAB EVAL
Pt received in semisupine position. Pt mostly [place] speaking, however able to make some needs known in English. Pt's son able to provide translation as needed.

## 2018-04-02 NOTE — DISCHARGE NOTE ADULT - CARE PLAN
Principal Discharge DX:	CVA (cerebral vascular accident)  Goal:	To help recover or improve as much as possible your sensory and motor abilities, to become more independent, and prevent future strokes.  Assessment and plan of treatment:	Continue physical therapy and skills learned for rehabilitation.  Follow up with your neurologist in 1-2 weeks for further medical management.  Continue to take your medications as prescribed, low salt, low fat, and diabetic diet.  Consider joining a support group for stroke survivors for emotional support to prevent depression.  Secondary Diagnosis:	Essential hypertension  Assessment and plan of treatment:	Low sodium and fat diet, continue anti-hypertensive medications, and follow up with primary care physician.  Secondary Diagnosis:	DM (diabetes mellitus)  Assessment and plan of treatment:	Monitor finger sticks pre-meal and bedtime, low salt, fat and carbohydrate diet, minimize glucose intake.  Exercise daily for at least 30 minutes and weight loss.  Follow up with primary care physician and endocrinologist for routine Hemoglobin A1C checks and management.  Follow up with your ophthalmologist for routine yearly vision exams.  Secondary Diagnosis:	Hyperlipidemia, unspecified hyperlipidemia type  Assessment and plan of treatment:	Low fat diet, exercise daily and continue current medications. Follow up with primary care physician and cardiologist for management. Principal Discharge DX:	CVA (cerebral vascular accident)  Goal:	To help recover or improve as much as possible your sensory and motor abilities, to become more independent, and prevent future strokes.  Assessment and plan of treatment:	Continue physical therapy and skills learned for rehabilitation.  Follow up with your neurologist in 1-2 weeks for further medical management.  Continue to take your medications as prescribed, low salt, low fat, and diabetic diet.  Consider joining a support group for stroke survivors for emotional support to prevent depression. Information provided below for Dr. Turk, please call for an appointment to help coordinate loop recorder and transesophageal echocardiogram.  Secondary Diagnosis:	Essential hypertension  Assessment and plan of treatment:	Low sodium and fat diet, continue anti-hypertensive medications, and follow up with primary care physician.  Secondary Diagnosis:	DM (diabetes mellitus)  Assessment and plan of treatment:	Monitor finger sticks pre-meal and bedtime, low salt, fat and carbohydrate diet, minimize glucose intake.  Exercise daily for at least 30 minutes and weight loss.  Follow up with primary care physician and endocrinologist for routine Hemoglobin A1C checks and management.  Follow up with your ophthalmologist for routine yearly vision exams.  Secondary Diagnosis:	Hyperlipidemia, unspecified hyperlipidemia type  Assessment and plan of treatment:	Low fat diet, exercise daily and continue current medications. Follow up with primary care physician and cardiologist for management.

## 2018-04-02 NOTE — CONSULT NOTE ADULT - ASSESSMENT
57 Y F with PMH of CAD, DM, HTN presented to ED after episode of slurred speech at home. As per family, water was coming out of her mouth when she was trying to speak. Her NIHSS was 0 and MRS is 0. Her symptoms resolved after 30 minutes. She felt weakness overall. She denied weakness, numbness, headache and change in vision currently. Her speech is back to baseline.     Impression     Acute stroke vs TIA     Plan     Permissive HTN x 24hrs goal /120  MRI brain w/o cont  MRA brain w/o cont   MRA neck w/ cont  HgA1c  Lipid profile  ASA 81mg,  Atorvastatin 80 mg   TTE  Telemetry monitoring  PT is not needed   outpatient neurology follow up
Small acute/subacute infarcts within the right corona radiata extending into the right insular cortex and right frontal operculum with   associated cytotoxic edema. No hemorrhagic transformation.    Unclear etiology at this time    Continue current medical regimen: aspirin 81 QD and Lipitor 80 QHS    F/U with me in the office for further evaluation, including NESSA --> possible ILR.

## 2018-04-02 NOTE — OCCUPATIONAL THERAPY INITIAL EVALUATION ADULT - MD ORDER
Occupational Therapy to evaluate and treat. Occupational Therapy to evaluate and treat.  Out of bed with assistance.

## 2018-04-02 NOTE — PHYSICAL THERAPY INITIAL EVALUATION ADULT - ADDITIONAL COMMENTS
Pt reports that she lives in a private house with , son, and daughter in law with (+)stairs to negotiate (~12 steps). Prior to hospital admission pt was completely independent and used single axis cane PRN.    Pt left comfortable in bed, NAD, all lines intact, all precautions maintained, with call bell in reach, son @bedside, bed alarm on, and RN aware of PT evaluation.

## 2018-04-02 NOTE — DISCHARGE NOTE ADULT - PROVIDER TOKENS
TOKMARTA:'[id number]' TOKEN:'[id number]',TOKEN:'[id number]' TOKEN:'[id number]',TOKEN:'[id number]'

## 2018-04-06 ENCOUNTER — EMERGENCY (EMERGENCY)
Facility: HOSPITAL | Age: 58
LOS: 1 days | Discharge: HOME | End: 2018-04-06
Attending: EMERGENCY MEDICINE | Admitting: EMERGENCY MEDICINE
Payer: MEDICAID

## 2018-04-06 VITALS
SYSTOLIC BLOOD PRESSURE: 146 MMHG | DIASTOLIC BLOOD PRESSURE: 90 MMHG | OXYGEN SATURATION: 100 % | RESPIRATION RATE: 16 BRPM | HEART RATE: 103 BPM | TEMPERATURE: 99 F

## 2018-04-06 VITALS
RESPIRATION RATE: 18 BRPM | SYSTOLIC BLOOD PRESSURE: 145 MMHG | TEMPERATURE: 99 F | HEART RATE: 96 BPM | DIASTOLIC BLOOD PRESSURE: 82 MMHG | OXYGEN SATURATION: 99 %

## 2018-04-06 DIAGNOSIS — Z98.890 OTHER SPECIFIED POSTPROCEDURAL STATES: Chronic | ICD-10-CM

## 2018-04-06 LAB
ALBUMIN SERPL ELPH-MCNC: 3.7 G/DL — SIGNIFICANT CHANGE UP (ref 3.3–5)
ALP SERPL-CCNC: 97 U/L — SIGNIFICANT CHANGE UP (ref 40–120)
ALT FLD-CCNC: 29 U/L — SIGNIFICANT CHANGE UP (ref 4–33)
AST SERPL-CCNC: 19 U/L — SIGNIFICANT CHANGE UP (ref 4–32)
BASOPHILS # BLD AUTO: 0.05 K/UL — SIGNIFICANT CHANGE UP (ref 0–0.2)
BASOPHILS NFR BLD AUTO: 0.5 % — SIGNIFICANT CHANGE UP (ref 0–2)
BILIRUB SERPL-MCNC: 0.3 MG/DL — SIGNIFICANT CHANGE UP (ref 0.2–1.2)
BUN SERPL-MCNC: 12 MG/DL — SIGNIFICANT CHANGE UP (ref 7–23)
CALCIUM SERPL-MCNC: 9.3 MG/DL — SIGNIFICANT CHANGE UP (ref 8.4–10.5)
CHLORIDE SERPL-SCNC: 99 MMOL/L — SIGNIFICANT CHANGE UP (ref 98–107)
CO2 SERPL-SCNC: 28 MMOL/L — SIGNIFICANT CHANGE UP (ref 22–31)
CREAT SERPL-MCNC: 0.55 MG/DL — SIGNIFICANT CHANGE UP (ref 0.5–1.3)
EOSINOPHIL # BLD AUTO: 0.11 K/UL — SIGNIFICANT CHANGE UP (ref 0–0.5)
EOSINOPHIL NFR BLD AUTO: 1.1 % — SIGNIFICANT CHANGE UP (ref 0–6)
GLUCOSE SERPL-MCNC: 199 MG/DL — HIGH (ref 70–99)
HCT VFR BLD CALC: 41.3 % — SIGNIFICANT CHANGE UP (ref 34.5–45)
HGB BLD-MCNC: 12.9 G/DL — SIGNIFICANT CHANGE UP (ref 11.5–15.5)
IMM GRANULOCYTES # BLD AUTO: 0.04 # — SIGNIFICANT CHANGE UP
IMM GRANULOCYTES NFR BLD AUTO: 0.4 % — SIGNIFICANT CHANGE UP (ref 0–1.5)
LYMPHOCYTES # BLD AUTO: 2.74 K/UL — SIGNIFICANT CHANGE UP (ref 1–3.3)
LYMPHOCYTES # BLD AUTO: 27.2 % — SIGNIFICANT CHANGE UP (ref 13–44)
MCHC RBC-ENTMCNC: 24.1 PG — LOW (ref 27–34)
MCHC RBC-ENTMCNC: 31.2 % — LOW (ref 32–36)
MCV RBC AUTO: 77.2 FL — LOW (ref 80–100)
MONOCYTES # BLD AUTO: 0.67 K/UL — SIGNIFICANT CHANGE UP (ref 0–0.9)
MONOCYTES NFR BLD AUTO: 6.7 % — SIGNIFICANT CHANGE UP (ref 2–14)
NEUTROPHILS # BLD AUTO: 6.45 K/UL — SIGNIFICANT CHANGE UP (ref 1.8–7.4)
NEUTROPHILS NFR BLD AUTO: 64.1 % — SIGNIFICANT CHANGE UP (ref 43–77)
NRBC # FLD: 0 — SIGNIFICANT CHANGE UP
PLATELET # BLD AUTO: 278 K/UL — SIGNIFICANT CHANGE UP (ref 150–400)
PMV BLD: 10.6 FL — SIGNIFICANT CHANGE UP (ref 7–13)
POTASSIUM SERPL-MCNC: 4.2 MMOL/L — SIGNIFICANT CHANGE UP (ref 3.5–5.3)
POTASSIUM SERPL-SCNC: 4.2 MMOL/L — SIGNIFICANT CHANGE UP (ref 3.5–5.3)
PROT SERPL-MCNC: 7.7 G/DL — SIGNIFICANT CHANGE UP (ref 6–8.3)
RBC # BLD: 5.35 M/UL — HIGH (ref 3.8–5.2)
RBC # FLD: 15.9 % — HIGH (ref 10.3–14.5)
SODIUM SERPL-SCNC: 140 MMOL/L — SIGNIFICANT CHANGE UP (ref 135–145)
TROPONIN T SERPL-MCNC: < 0.06 NG/ML — SIGNIFICANT CHANGE UP (ref 0–0.06)
WBC # BLD: 10.06 K/UL — SIGNIFICANT CHANGE UP (ref 3.8–10.5)
WBC # FLD AUTO: 10.06 K/UL — SIGNIFICANT CHANGE UP (ref 3.8–10.5)

## 2018-04-06 PROCEDURE — 99285 EMERGENCY DEPT VISIT HI MDM: CPT | Mod: 25

## 2018-04-06 PROCEDURE — 93010 ELECTROCARDIOGRAM REPORT: CPT

## 2018-04-06 PROCEDURE — 71046 X-RAY EXAM CHEST 2 VIEWS: CPT | Mod: 26

## 2018-04-06 NOTE — ED PROVIDER NOTE - OBJECTIVE STATEMENT
57F PMH HTN, T2DM, s/p CVA discharged from the hospital 3 days ago, presenting with tachycardia associated with palpitations, and shortness of breath at 7PM today. Pt was due for 8PM cardizem, which was give, called 911, and symptoms improved. Per EMS records the pt was s/p adenosine 6mgx1. Unknown what rhythm pt was in at that time.    No fevers, chills, abd pain, n/v/d. Pt still experiencing some difficulty swallowing since d/c with stroke. 57F PMH HTN, T2DM, s/p CVA discharged from the hospital 3 days ago, presenting with tachycardia associated with palpitations, and shortness of breath at 7PM today. Pt was due for 8PM cardizem, which was give, called 911, and symptoms improved. Per EMS records the pt was s/p adenosine 6mgx1. Unknown what rhythm pt was in at that time.    Used Stevenson  [id number]  No fevers, chills, abd pain, n/v/d. Pt still experiencing some difficulty swallowing since d/c with stroke.

## 2018-04-06 NOTE — ED PROVIDER NOTE - MEDICAL DECISION MAKING DETAILS
57F PMH HTN T2DM recent CVA p/w tachycardia, unclear if afib with RVR or SVT. Pt now in sinus tachycardia and asymptomatic. Will check CBC CMP Cardiac Enzymes CXR and tele monitoring with reassessment.

## 2018-04-06 NOTE — ED PROVIDER NOTE - PROGRESS NOTE DETAILS
Bradley PGY-2: Pt remains in sinus rhythm, HR <100, pt asymptomatic, CXR clear. Plan to d/c home with follow up.

## 2018-04-06 NOTE — ED PROVIDER NOTE - PLAN OF CARE
Follow up with your primary care doctor in 48 hours.  Return to the ED if you have any fevers, chills, chest pain, shortness of breath, or any other concerning symptoms.  Continue to take your home medications as previously prescribed.

## 2018-04-06 NOTE — ED ADULT NURSE NOTE - OBJECTIVE STATEMENT
Pt received A&Ox3, Stevenson speaking but understands some English, to ED Rm 10 with c/o acute onset of palp & SOB this evening. States she took her cardizem - unknown dx. EMS stated that she was tachycardic & tachypneic; gave 6 of adenosine to 18g LAC. Pt arrives to ED in NSR (90s bpm). Pt denies CP, LOC. Labs sent per MD orders. Spouse at bedside. Pt received A&Ox3, Stevenson speaking but understands some English, to ED Rm 10 with c/o acute onset of palp & SOB this evening. States she took her cardizem - unknown dx. EMS stated that she was tachycardic/SVT & tachypneic; gave 6 of adenosine to 18g LAC. Pt arrives to ED in NSR (90s bpm). RR equal & unlabored. Pt denies CP, LOC. Labs sent per MD orders. Spouse at bedside.

## 2018-04-06 NOTE — ED ADULT TRIAGE NOTE - CHIEF COMPLAINT QUOTE
pt arrives w/ c./o heart racing. EMS states pt in SVT gvn 6 Adenosine arrives w/ 18 gauge to LAC with NS infusing. pt states feel like her heart is still racing. EKG in progress HR low 100s.

## 2018-04-06 NOTE — ED PROVIDER NOTE - ATTENDING CONTRIBUTION TO CARE
I agree with the above H&P.  Briefly this is a 57 year old female with recent cva presenting with palpitations. no cp sob fever or cough.  patient well appearing.  will check basic labs elctrolhytes trops cxr.  concern for a fib vs svt.  patient received adenosine by ems but appears to be sinus tach.

## 2018-04-09 PROBLEM — Z00.00 ENCOUNTER FOR PREVENTIVE HEALTH EXAMINATION: Status: ACTIVE | Noted: 2018-04-09

## 2018-04-13 RX ORDER — IBUPROFEN 200 MG
1 TABLET ORAL
Qty: 0 | Refills: 0 | COMMUNITY

## 2018-04-13 RX ORDER — ATORVASTATIN CALCIUM 80 MG/1
1 TABLET, FILM COATED ORAL
Qty: 0 | Refills: 0 | COMMUNITY

## 2018-04-13 RX ORDER — DILTIAZEM HCL 120 MG
1 CAPSULE, EXT RELEASE 24 HR ORAL
Qty: 0 | Refills: 0 | COMMUNITY

## 2018-04-30 ENCOUNTER — APPOINTMENT (OUTPATIENT)
Dept: PHYSICAL MEDICINE AND REHAB | Facility: CLINIC | Age: 58
End: 2018-04-30

## 2018-08-19 ENCOUNTER — EMERGENCY (EMERGENCY)
Facility: HOSPITAL | Age: 58
LOS: 1 days | Discharge: HOME | End: 2018-08-19
Attending: EMERGENCY MEDICINE
Payer: MEDICAID

## 2018-08-19 VITALS
TEMPERATURE: 98 F | WEIGHT: 164.91 LBS | HEART RATE: 70 BPM | RESPIRATION RATE: 16 BRPM | SYSTOLIC BLOOD PRESSURE: 144 MMHG | OXYGEN SATURATION: 99 % | HEIGHT: 63 IN | DIASTOLIC BLOOD PRESSURE: 80 MMHG

## 2018-08-19 VITALS
RESPIRATION RATE: 18 BRPM | TEMPERATURE: 98 F | HEART RATE: 78 BPM | OXYGEN SATURATION: 99 % | SYSTOLIC BLOOD PRESSURE: 147 MMHG | DIASTOLIC BLOOD PRESSURE: 78 MMHG

## 2018-08-19 DIAGNOSIS — Z98.890 OTHER SPECIFIED POSTPROCEDURAL STATES: Chronic | ICD-10-CM

## 2018-08-19 PROBLEM — I10 ESSENTIAL (PRIMARY) HYPERTENSION: Chronic | Status: ACTIVE | Noted: 2018-04-01

## 2018-08-19 PROBLEM — E78.5 HYPERLIPIDEMIA, UNSPECIFIED: Chronic | Status: ACTIVE | Noted: 2018-04-01

## 2018-08-19 LAB
ALBUMIN SERPL ELPH-MCNC: 4.4 G/DL — SIGNIFICANT CHANGE UP (ref 3.3–5.2)
ALP SERPL-CCNC: 93 U/L — SIGNIFICANT CHANGE UP (ref 40–120)
ALT FLD-CCNC: 41 U/L — HIGH
ANION GAP SERPL CALC-SCNC: 15 MMOL/L — SIGNIFICANT CHANGE UP (ref 5–17)
APPEARANCE UR: CLEAR — SIGNIFICANT CHANGE UP
APTT BLD: 31.2 SEC — SIGNIFICANT CHANGE UP (ref 27.5–37.4)
AST SERPL-CCNC: 29 U/L — SIGNIFICANT CHANGE UP
BASOPHILS # BLD AUTO: 0 K/UL — SIGNIFICANT CHANGE UP (ref 0–0.2)
BASOPHILS NFR BLD AUTO: 0.3 % — SIGNIFICANT CHANGE UP (ref 0–2)
BILIRUB SERPL-MCNC: 0.2 MG/DL — LOW (ref 0.4–2)
BILIRUB UR-MCNC: NEGATIVE — SIGNIFICANT CHANGE UP
BUN SERPL-MCNC: 9 MG/DL — SIGNIFICANT CHANGE UP (ref 8–20)
CALCIUM SERPL-MCNC: 9.5 MG/DL — SIGNIFICANT CHANGE UP (ref 8.6–10.2)
CHLORIDE SERPL-SCNC: 100 MMOL/L — SIGNIFICANT CHANGE UP (ref 98–107)
CO2 SERPL-SCNC: 25 MMOL/L — SIGNIFICANT CHANGE UP (ref 22–29)
COLOR SPEC: YELLOW — SIGNIFICANT CHANGE UP
CREAT SERPL-MCNC: 0.44 MG/DL — LOW (ref 0.5–1.3)
DIFF PNL FLD: NEGATIVE — SIGNIFICANT CHANGE UP
EOSINOPHIL # BLD AUTO: 0.1 K/UL — SIGNIFICANT CHANGE UP (ref 0–0.5)
EOSINOPHIL NFR BLD AUTO: 1 % — SIGNIFICANT CHANGE UP (ref 0–6)
GLUCOSE SERPL-MCNC: 146 MG/DL — HIGH (ref 70–115)
GLUCOSE UR QL: NEGATIVE MG/DL — SIGNIFICANT CHANGE UP
HCT VFR BLD CALC: 39.7 % — SIGNIFICANT CHANGE UP (ref 37–47)
HGB BLD-MCNC: 12.8 G/DL — SIGNIFICANT CHANGE UP (ref 12–16)
INR BLD: 0.98 RATIO — SIGNIFICANT CHANGE UP (ref 0.88–1.16)
KETONES UR-MCNC: NEGATIVE — SIGNIFICANT CHANGE UP
LEUKOCYTE ESTERASE UR-ACNC: NEGATIVE — SIGNIFICANT CHANGE UP
LYMPHOCYTES # BLD AUTO: 28.8 % — SIGNIFICANT CHANGE UP (ref 20–55)
LYMPHOCYTES # BLD AUTO: 3 K/UL — SIGNIFICANT CHANGE UP (ref 1–4.8)
MCHC RBC-ENTMCNC: 25.9 PG — LOW (ref 27–31)
MCHC RBC-ENTMCNC: 32.2 G/DL — SIGNIFICANT CHANGE UP (ref 32–36)
MCV RBC AUTO: 80.4 FL — LOW (ref 81–99)
MONOCYTES # BLD AUTO: 0.5 K/UL — SIGNIFICANT CHANGE UP (ref 0–0.8)
MONOCYTES NFR BLD AUTO: 5.2 % — SIGNIFICANT CHANGE UP (ref 3–10)
NEUTROPHILS # BLD AUTO: 6.6 K/UL — SIGNIFICANT CHANGE UP (ref 1.8–8)
NEUTROPHILS NFR BLD AUTO: 64.5 % — SIGNIFICANT CHANGE UP (ref 37–73)
NITRITE UR-MCNC: NEGATIVE — SIGNIFICANT CHANGE UP
PH UR: 7 — SIGNIFICANT CHANGE UP (ref 5–8)
PLATELET # BLD AUTO: 257 K/UL — SIGNIFICANT CHANGE UP (ref 150–400)
POTASSIUM SERPL-MCNC: 4.3 MMOL/L — SIGNIFICANT CHANGE UP (ref 3.5–5.3)
POTASSIUM SERPL-SCNC: 4.3 MMOL/L — SIGNIFICANT CHANGE UP (ref 3.5–5.3)
PROT SERPL-MCNC: 8.2 G/DL — SIGNIFICANT CHANGE UP (ref 6.6–8.7)
PROT UR-MCNC: NEGATIVE MG/DL — SIGNIFICANT CHANGE UP
PROTHROM AB SERPL-ACNC: 10.8 SEC — SIGNIFICANT CHANGE UP (ref 9.8–12.7)
RBC # BLD: 4.94 M/UL — SIGNIFICANT CHANGE UP (ref 4.4–5.2)
RBC # FLD: 15.4 % — SIGNIFICANT CHANGE UP (ref 11–15.6)
SODIUM SERPL-SCNC: 140 MMOL/L — SIGNIFICANT CHANGE UP (ref 135–145)
SP GR SPEC: 1.01 — SIGNIFICANT CHANGE UP (ref 1.01–1.02)
TROPONIN T SERPL-MCNC: <0.01 NG/ML — SIGNIFICANT CHANGE UP (ref 0–0.06)
UROBILINOGEN FLD QL: NEGATIVE MG/DL — SIGNIFICANT CHANGE UP
WBC # BLD: 10.3 K/UL — SIGNIFICANT CHANGE UP (ref 4.8–10.8)
WBC # FLD AUTO: 10.3 K/UL — SIGNIFICANT CHANGE UP (ref 4.8–10.8)

## 2018-08-19 PROCEDURE — 80053 COMPREHEN METABOLIC PANEL: CPT

## 2018-08-19 PROCEDURE — 36415 COLL VENOUS BLD VENIPUNCTURE: CPT

## 2018-08-19 PROCEDURE — 84484 ASSAY OF TROPONIN QUANT: CPT

## 2018-08-19 PROCEDURE — 70450 CT HEAD/BRAIN W/O DYE: CPT

## 2018-08-19 PROCEDURE — 81003 URINALYSIS AUTO W/O SCOPE: CPT

## 2018-08-19 PROCEDURE — 99284 EMERGENCY DEPT VISIT MOD MDM: CPT

## 2018-08-19 PROCEDURE — 85610 PROTHROMBIN TIME: CPT

## 2018-08-19 PROCEDURE — 82550 ASSAY OF CK (CPK): CPT

## 2018-08-19 PROCEDURE — 99284 EMERGENCY DEPT VISIT MOD MDM: CPT | Mod: 25

## 2018-08-19 PROCEDURE — 93010 ELECTROCARDIOGRAM REPORT: CPT

## 2018-08-19 PROCEDURE — 83735 ASSAY OF MAGNESIUM: CPT

## 2018-08-19 PROCEDURE — 93005 ELECTROCARDIOGRAM TRACING: CPT

## 2018-08-19 PROCEDURE — 70450 CT HEAD/BRAIN W/O DYE: CPT | Mod: 26

## 2018-08-19 PROCEDURE — 85730 THROMBOPLASTIN TIME PARTIAL: CPT

## 2018-08-19 PROCEDURE — 85027 COMPLETE CBC AUTOMATED: CPT

## 2018-08-19 RX ORDER — METFORMIN HYDROCHLORIDE 850 MG/1
1 TABLET ORAL
Qty: 0 | Refills: 0 | COMMUNITY

## 2018-08-19 RX ORDER — SODIUM CHLORIDE 9 MG/ML
1000 INJECTION, SOLUTION INTRAVENOUS ONCE
Qty: 0 | Refills: 0 | Status: COMPLETED | OUTPATIENT
Start: 2018-08-19 | End: 2018-08-19

## 2018-08-19 RX ORDER — FERROUS GLUCONATE 100 %
1 POWDER (GRAM) MISCELLANEOUS
Qty: 0 | Refills: 0 | COMMUNITY

## 2018-08-19 RX ORDER — FERROUS GLUCONATE 100 %
0 POWDER (GRAM) MISCELLANEOUS
Qty: 0 | Refills: 0 | COMMUNITY

## 2018-08-19 RX ORDER — DILTIAZEM HCL 120 MG
1 CAPSULE, EXT RELEASE 24 HR ORAL
Qty: 0 | Refills: 0 | COMMUNITY

## 2018-08-19 RX ORDER — FOLIC ACID 0.8 MG
1 TABLET ORAL
Qty: 0 | Refills: 0 | COMMUNITY

## 2018-08-19 RX ORDER — GABAPENTIN 400 MG/1
1 CAPSULE ORAL
Qty: 0 | Refills: 0 | COMMUNITY

## 2018-08-19 RX ORDER — SITAGLIPTIN 50 MG/1
1 TABLET, FILM COATED ORAL
Qty: 0 | Refills: 0 | COMMUNITY

## 2018-08-19 RX ADMIN — SODIUM CHLORIDE 1000 MILLILITER(S): 9 INJECTION, SOLUTION INTRAVENOUS at 16:52

## 2018-08-19 NOTE — ED ADULT NURSE NOTE - NSIMPLEMENTINTERV_GEN_ALL_ED
Implemented All Fall Risk Interventions:  [place] to call system. Call bell, personal items and telephone within reach. Instruct patient to call for assistance. Room bathroom lighting operational. Non-slip footwear when patient is off stretcher. Physically safe environment: no spills, clutter or unnecessary equipment. Stretcher in lowest position, wheels locked, appropriate side rails in place. Provide visual cue, wrist band, yellow gown, etc. Monitor gait and stability. Monitor for mental status changes and reorient to person, place, and time. Review medications for side effects contributing to fall risk. Reinforce activity limits and safety measures with patient and family.

## 2018-08-19 NOTE — ED PROVIDER NOTE - CARE PLAN
Principal Discharge DX:	Generalized weakness  Secondary Diagnosis:	History of CVA (cerebrovascular accident)

## 2018-08-19 NOTE — ED ADULT TRIAGE NOTE - CHIEF COMPLAINT QUOTE
right hand shaking x 2 days, not visibly shaking in triage daughter states patient is always hungry despite just eating states wants her checked for stroke

## 2018-08-19 NOTE — ED PROVIDER NOTE - NS ED ROS FT
Const: Denies fever, chills  HEENT: Denies blurry vision, sore throat  Neck: + right sided neck pain.  Resp: Denies coughing, SOB  Cardiovascular: Denies CP, palpitations, LE edema  GI: Denies nausea, vomiting, abdominal pain, diarrhea, constipation, blood in stool  : Denies urinary frequency/urgency/dysuria, hematuria  MSK: Denies back pain  Neuro: + HA, + weakness right arm. Denies dizziness, numbness  Skin: Denies rashes.

## 2018-08-19 NOTE — ED PROVIDER NOTE - OBJECTIVE STATEMENT
Patient with PMH CAD, DM, HTN, HLD, TIA 2 months ago presents complaining of generalized weakness since waking up this morning. She also complains of difficulty with speaking and swallowing x 2 weeks and right arm tremor x 3 weeks when hanging the arm at rest. She recently stopped physical therapy s/p her TIA around the time these symptoms started. She notes right sided neck pain and a mild right sided headache which is not unusual for her. She has been ambulating well without dizziness or ataxia without assistance. She has been taking gabapentin for the past 2 weeks, but does not like how it makes her feel and therefore has not been taking it consistently. She has not followed up with a neurologist, but did have an MRI last month, which the daughter noted "was fine." She denies chest pain, SOB, coughing, nausea, abdominal pain, diarrhea, constipation, urinary symptoms. Patient's daughter is asking for "energy tablets" to "give her more energy."

## 2018-08-19 NOTE — ED PROVIDER NOTE - PROGRESS NOTE DETAILS
I received a call from radiologist noting only old CVA in right corona radiata without new lesions at this time.

## 2018-08-19 NOTE — ED STATDOCS - PMH
CAD (coronary artery disease)    DM (diabetes mellitus)    Hyperlipidemia    Hypertension    Mini stroke

## 2018-08-19 NOTE — ED PROVIDER NOTE - MEDICAL DECISION MAKING DETAILS
Patient with recent TIA presents with some chronic, but worsening findings of RUE weakness and "low energy." Will CT head to rule out worsening of prior CVA, check labs, EKG and UA to rule out infectious etiology and give IV fluids. I recommended that the patient follow up as outpatient with neurology.

## 2018-08-19 NOTE — ED ADULT NURSE NOTE - OBJECTIVE STATEMENT
daughter reports pt with right hand tremors for 2 days, lower leg cramping, generalized weakness with inability to stand for long periods of time. reports feeling fevers, no recorded temperatures at home. pt reports feeling consistent hunger despite eating. pt also reporting pain to heart.

## 2018-08-19 NOTE — ED PROVIDER NOTE - PHYSICAL EXAMINATION
Const: Awake, alert and oriented. In no acute distress. Well appearing.  HEENT: NC/AT. Moist mucous membranes.  Eyes: No scleral icterus. EOMI.  Neck:. Soft and supple. Full ROM without pain.  Cardiac: Regular rate and regular rhythm. +S1/S2. No murmurs. Peripheral pulses 2+ and symmetric. No LE edema.  Resp: Speaking in full sentences. No evidence of respiratory distress. No wheezes, rales or rhonchi.  Abd: Soft, non-tender, non-distended. Normal bowel sounds in all 4 quadrants. No guarding or rebound.  Back: Spine midline and non-tender. No CVAT.  MSK: Atropy of the right hand and right upper extremity.  Skin: No rashes, abrasions or lacerations.  Lymph: No cervical lymphadenopathy.  Neuro: CN II-XII grossly in tact. Symmetrical smile. PERRL. EOMI. Bilateral and symmetric sensation of face. Tongue midline. Normal finger to nose. Normal heel to shin. 5/5 strength left UE, 4/5 strength right UE, 5/5 strength bilateral LE. Normal rapid alternating movements. No pronator drift. Normal gait without assistance. Sensation symmetrically intact bilateral upper and lower extremities. Reflexes 2+ bilaterally. Babinski negative bilaterally.

## 2018-08-19 NOTE — ED STATDOCS - PROGRESS NOTE DETAILS
Will send to Main ED for further evaluation, will order CT. 57 y/o F pt with DM, HTN, CAD, HLD, recent TIA presents to ED c/o intermittent tremors of her RUE with lowering arm for the last 2 days as well as full body weakness since yesterday to the point where she cannot ambulate. The pt c/o cramping of the b/l LE's and mild intermittent HA. s/p TIA in [facility] about 2 months ago. She ate in the morning but has continued hunger. Denies N/V/D, fever, chills, SOB, CP, difficulty breathing, HA, diaphoresis, or abd pain.  PCP: in [place]

## 2018-09-10 PROBLEM — I63.9 CEREBRAL INFARCTION, UNSPECIFIED: Chronic | Status: ACTIVE | Noted: 2018-08-19

## 2018-10-10 ENCOUNTER — EMERGENCY (EMERGENCY)
Facility: HOSPITAL | Age: 58
LOS: 1 days | Discharge: HOME | End: 2018-10-10
Attending: EMERGENCY MEDICINE | Admitting: EMERGENCY MEDICINE
Payer: MEDICAID

## 2018-10-10 VITALS
TEMPERATURE: 99 F | OXYGEN SATURATION: 100 % | SYSTOLIC BLOOD PRESSURE: 139 MMHG | RESPIRATION RATE: 16 BRPM | DIASTOLIC BLOOD PRESSURE: 68 MMHG | HEART RATE: 72 BPM

## 2018-10-10 DIAGNOSIS — Z98.890 OTHER SPECIFIED POSTPROCEDURAL STATES: Chronic | ICD-10-CM

## 2018-10-10 PROCEDURE — 99283 EMERGENCY DEPT VISIT LOW MDM: CPT

## 2018-10-10 NOTE — ED ADULT TRIAGE NOTE - CHIEF COMPLAINT QUOTE
Pt c/o of having right ear pain for one week as per the patient she has an infection pt is noted with a hearing aid in the ear. PMH hearing loss.

## 2018-10-10 NOTE — ED PROVIDER NOTE - OBJECTIVE STATEMENT
58y F presents to the ED for right ear pain x2 weeks. Currently using hearing aid in right ear. Reports of clear yellow drainage coming out of ear. No fever or chills. No allergies

## 2018-10-10 NOTE — ED PROVIDER NOTE - ENMT, MLM
Oropharynx clear. Right TM clear but external ear canal has significant exudate and erythema. Very tender. No TTP of tragus

## 2018-10-10 NOTE — ED PROVIDER NOTE - MEDICAL DECISION MAKING DETAILS
Otitis externa with exudate. Pt on Augmentin and abx ear drops. Recommend to stop using hearing aid to allow for infection to clear

## 2018-11-26 ENCOUNTER — APPOINTMENT (OUTPATIENT)
Dept: NEUROLOGY | Facility: CLINIC | Age: 58
End: 2018-11-26
Payer: MEDICAID

## 2018-11-26 VITALS
BODY MASS INDEX: 25.69 KG/M2 | DIASTOLIC BLOOD PRESSURE: 64 MMHG | WEIGHT: 145 LBS | HEIGHT: 63 IN | SYSTOLIC BLOOD PRESSURE: 120 MMHG

## 2018-11-26 DIAGNOSIS — Z86.79 PERSONAL HISTORY OF OTHER DISEASES OF THE CIRCULATORY SYSTEM: ICD-10-CM

## 2018-11-26 DIAGNOSIS — Z86.39 PERSONAL HISTORY OF OTHER ENDOCRINE, NUTRITIONAL AND METABOLIC DISEASE: ICD-10-CM

## 2018-11-26 DIAGNOSIS — Z83.3 FAMILY HISTORY OF DIABETES MELLITUS: ICD-10-CM

## 2018-11-26 PROCEDURE — 99214 OFFICE O/P EST MOD 30 MIN: CPT

## 2018-11-26 RX ORDER — CHOLECALCIFEROL (VITAMIN D3) 125 MCG
TABLET ORAL
Refills: 0 | Status: COMPLETED | COMMUNITY

## 2018-11-26 RX ORDER — DILTIAZEM HYDROCHLORIDE 300 MG/1
CAPSULE, COATED, EXTENDED RELEASE ORAL
Refills: 0 | Status: ACTIVE | COMMUNITY

## 2018-11-26 RX ORDER — ASPIRIN 325 MG/1
TABLET, FILM COATED ORAL
Refills: 0 | Status: COMPLETED | COMMUNITY

## 2018-11-26 RX ORDER — DICLOFENAC SODIUM 10 MG/G
1 GEL TOPICAL
Refills: 0 | Status: COMPLETED | COMMUNITY

## 2018-11-26 RX ORDER — ENALAPRIL MALEATE 5 MG/1
TABLET ORAL
Refills: 0 | Status: ACTIVE | COMMUNITY

## 2018-11-26 RX ORDER — BETAMETHASONE VALERATE 1 MG/ML
0.1 LOTION CUTANEOUS
Refills: 0 | Status: COMPLETED | COMMUNITY

## 2018-11-26 RX ORDER — SITAGLIPTIN AND METFORMIN HYDROCHLORIDE 50; 1000 MG/1; MG/1
TABLET, FILM COATED ORAL
Refills: 0 | Status: ACTIVE | COMMUNITY

## 2018-11-26 RX ORDER — ATORVASTATIN CALCIUM 80 MG/1
TABLET, FILM COATED ORAL
Refills: 0 | Status: ACTIVE | COMMUNITY

## 2018-11-26 RX ORDER — DILTIAZEM HYDROCHLORIDE 300 MG/1
CAPSULE, COATED, EXTENDED RELEASE ORAL
Refills: 0 | Status: COMPLETED | COMMUNITY

## 2018-11-26 RX ORDER — CLOTRIMAZOLE AND BETAMETHASONE DIPROPIONATE 10; .5 MG/G; MG/G
CREAM TOPICAL
Refills: 0 | Status: COMPLETED | COMMUNITY

## 2018-11-26 RX ORDER — ASPIRIN 81 MG
81 TABLET, DELAYED RELEASE (ENTERIC COATED) ORAL
Refills: 0 | Status: ACTIVE | COMMUNITY

## 2018-11-26 RX ORDER — ATORVASTATIN CALCIUM 80 MG/1
TABLET, FILM COATED ORAL
Refills: 0 | Status: COMPLETED | COMMUNITY

## 2018-11-26 RX ORDER — SITAGLIPTIN 100 MG/1
TABLET, FILM COATED ORAL
Refills: 0 | Status: COMPLETED | COMMUNITY

## 2018-11-26 RX ORDER — ENALAPRIL MALEATE 5 MG/1
TABLET ORAL
Refills: 0 | Status: COMPLETED | COMMUNITY

## 2018-11-26 NOTE — REVIEW OF SYSTEMS
[Feeling Tired] : feeling tired [As Noted in HPI] : as noted in HPI [Loss Of Hearing] : hearing loss [Tinnitus] : tinnitus [Joint Pain] : joint pain [Lower Back Pain] : lower back pain [Upper Back Pain] : upper back pain [Negative] : Heme/Lymph

## 2018-11-26 NOTE — PHYSICAL EXAM
[General Appearance - Alert] : alert [General Appearance - In No Acute Distress] : in no acute distress [Oriented To Time, Place, And Person] : oriented to person, place, and time [Memory Recent] : recent memory was not impaired [Memory Remote] : remote memory was not impaired [Cranial Nerves Optic (II)] : visual acuity intact bilaterally,  visual fields full to confrontation, pupils equal round and reactive to light [Cranial Nerves Oculomotor (III)] : extraocular motion intact [Cranial Nerves Trigeminal (V)] : facial sensation intact symmetrically [Cranial Nerves Facial (VII)] : face symmetrical [Cranial Nerves Vestibulocochlear (VIII)] : hearing was intact bilaterally [Cranial Nerves Glossopharyngeal (IX)] : tongue and palate midline [Cranial Nerves Accessory (XI - Cranial And Spinal)] : head turning and shoulder shrug symmetric [Cranial Nerves Hypoglossal (XII)] : there was no tongue deviation with protrusion [Motor Strength] : muscle strength was normal in all four extremities [Sensation Tactile Decrease] : light touch was intact [Sensation Pain / Temperature Decrease] : pain and temperature was intact [Sensation Vibration Decrease] : vibration was intact [Tremor] : a tremor present [2+] : Patella left 2+ [1+] : Ankle jerk left 1+ [PERRL With Normal Accommodation] : pupils were equal in size, round, reactive to light, with normal accommodation [Optic Disc Abnormality] : the optic disc were normal in size and color [Arterial Pulses Carotid] : carotid pulses were normal with no bruits [Edema] : there was no peripheral edema [Dysarthria] : no dysarthria [Aphasia] : no dysphasia/aphasia [Romberg's Sign] : Romberg's sign was negtive [Coordination - Dysmetria Impaired Finger-to-Nose Bilateral] : not present [Plantar Reflex Right Only] : normal on the right [Plantar Reflex Left Only] : normal on the left [FreeTextEntry6] : There is some mild cogwheeling at the right wrist with reinforcement.

## 2018-11-26 NOTE — DATA REVIEWED
[de-identified] : MRI of the brain was performed on 4/1/18.\par This study demonstrated small acute/subacute infarcts in the right corona radiata extending into the insular cortex and right frontal operculum. There was no hemorrhagic component.\par \par MRA of the head and neck from the same date was unremarkable.\par

## 2018-11-26 NOTE — ASSESSMENT
[FreeTextEntry1] : This is a 58-year-old woman with a history of recent stroke in April of 2018.\par \par She now has findings and symptoms of tremor in the right upper extremity.\par I have explained that the stroke was in the right hemisphere and would be unlikely to cause symptoms on the right side.\par My concern is the possibility of Parkinson's disease.\par I will obtain a dopamine spectroscopy scan.\par I would also like to obtain blood tests for thyroid function.\par \par I would consider a trial of Sinemet if there is an abnormality on dopamine spectroscopy scanning.\par \par In the meantime, I have recommended she continue the antiplatelet and statin medication.\par \par I will see her back in a few weeks.

## 2018-11-26 NOTE — HISTORY OF PRESENT ILLNESS
[FreeTextEntry1] : I saw this patient in the office today.\par \par She had a stroke in April of 2018. She had presented with slurred speech at that time.\par A few months afterwards she began noticing some tremors in the right hand.\par This has been with her ever since.\par It waxes and wanes in intensity.\par It subsides with action and worsens at rest.\par She reports some associated weakness with this.\par She denies any balance difficulty.\par \par \par

## 2018-12-18 ENCOUNTER — FORM ENCOUNTER (OUTPATIENT)
Age: 58
End: 2018-12-18

## 2018-12-19 ENCOUNTER — APPOINTMENT (OUTPATIENT)
Dept: NUCLEAR MEDICINE | Facility: CLINIC | Age: 58
End: 2018-12-19
Payer: MEDICAID

## 2018-12-19 ENCOUNTER — OUTPATIENT (OUTPATIENT)
Dept: OUTPATIENT SERVICES | Facility: HOSPITAL | Age: 58
LOS: 1 days | End: 2018-12-19

## 2018-12-19 DIAGNOSIS — Z98.890 OTHER SPECIFIED POSTPROCEDURAL STATES: Chronic | ICD-10-CM

## 2018-12-19 DIAGNOSIS — R25.1 TREMOR, UNSPECIFIED: ICD-10-CM

## 2018-12-19 PROCEDURE — 78607: CPT | Mod: 26

## 2019-01-04 ENCOUNTER — APPOINTMENT (OUTPATIENT)
Dept: NEUROLOGY | Facility: CLINIC | Age: 59
End: 2019-01-04
Payer: MEDICAID

## 2019-01-04 ENCOUNTER — LABORATORY RESULT (OUTPATIENT)
Age: 59
End: 2019-01-04

## 2019-01-04 ENCOUNTER — EMERGENCY (EMERGENCY)
Facility: HOSPITAL | Age: 59
LOS: 1 days | Discharge: HOME | End: 2019-01-04
Attending: EMERGENCY MEDICINE
Payer: MEDICAID

## 2019-01-04 VITALS
HEIGHT: 64 IN | DIASTOLIC BLOOD PRESSURE: 80 MMHG | OXYGEN SATURATION: 95 % | SYSTOLIC BLOOD PRESSURE: 148 MMHG | HEART RATE: 78 BPM | RESPIRATION RATE: 18 BRPM | TEMPERATURE: 97 F | WEIGHT: 145.06 LBS

## 2019-01-04 VITALS
SYSTOLIC BLOOD PRESSURE: 120 MMHG | DIASTOLIC BLOOD PRESSURE: 70 MMHG | BODY MASS INDEX: 25.52 KG/M2 | WEIGHT: 144 LBS | HEIGHT: 63 IN

## 2019-01-04 DIAGNOSIS — R25.1 TREMOR, UNSPECIFIED: ICD-10-CM

## 2019-01-04 DIAGNOSIS — Z98.890 OTHER SPECIFIED POSTPROCEDURAL STATES: Chronic | ICD-10-CM

## 2019-01-04 DIAGNOSIS — I63.9 CEREBRAL INFARCTION, UNSPECIFIED: ICD-10-CM

## 2019-01-04 DIAGNOSIS — N13.30 UNSPECIFIED HYDRONEPHROSIS: ICD-10-CM

## 2019-01-04 LAB
ALBUMIN SERPL ELPH-MCNC: 4.5 G/DL — SIGNIFICANT CHANGE UP (ref 3.3–5.2)
ALP SERPL-CCNC: 96 U/L — SIGNIFICANT CHANGE UP (ref 40–120)
ALT FLD-CCNC: 28 U/L — SIGNIFICANT CHANGE UP
ANION GAP SERPL CALC-SCNC: 13 MMOL/L — SIGNIFICANT CHANGE UP (ref 5–17)
APPEARANCE UR: CLEAR — SIGNIFICANT CHANGE UP
AST SERPL-CCNC: 18 U/L — SIGNIFICANT CHANGE UP
BASOPHILS # BLD AUTO: 0 K/UL — SIGNIFICANT CHANGE UP (ref 0–0.2)
BASOPHILS NFR BLD AUTO: 0.2 % — SIGNIFICANT CHANGE UP (ref 0–2)
BILIRUB SERPL-MCNC: 0.4 MG/DL — SIGNIFICANT CHANGE UP (ref 0.4–2)
BILIRUB UR-MCNC: NEGATIVE — SIGNIFICANT CHANGE UP
BUN SERPL-MCNC: 8 MG/DL — SIGNIFICANT CHANGE UP (ref 8–20)
CALCIUM SERPL-MCNC: 9.3 MG/DL — SIGNIFICANT CHANGE UP (ref 8.6–10.2)
CHLORIDE SERPL-SCNC: 100 MMOL/L — SIGNIFICANT CHANGE UP (ref 98–107)
CO2 SERPL-SCNC: 25 MMOL/L — SIGNIFICANT CHANGE UP (ref 22–29)
COLOR SPEC: YELLOW — SIGNIFICANT CHANGE UP
CREAT SERPL-MCNC: 0.41 MG/DL — LOW (ref 0.5–1.3)
DIFF PNL FLD: NEGATIVE — SIGNIFICANT CHANGE UP
EOSINOPHIL # BLD AUTO: 0 K/UL — SIGNIFICANT CHANGE UP (ref 0–0.5)
EOSINOPHIL NFR BLD AUTO: 0.4 % — SIGNIFICANT CHANGE UP (ref 0–6)
GLUCOSE SERPL-MCNC: 169 MG/DL — HIGH (ref 70–115)
GLUCOSE UR QL: NEGATIVE MG/DL — SIGNIFICANT CHANGE UP
HCT VFR BLD CALC: 39.4 % — SIGNIFICANT CHANGE UP (ref 37–47)
HGB BLD-MCNC: 12.7 G/DL — SIGNIFICANT CHANGE UP (ref 12–16)
KETONES UR-MCNC: NEGATIVE — SIGNIFICANT CHANGE UP
LEUKOCYTE ESTERASE UR-ACNC: NEGATIVE — SIGNIFICANT CHANGE UP
LIDOCAIN IGE QN: 39 U/L — SIGNIFICANT CHANGE UP (ref 22–51)
LYMPHOCYTES # BLD AUTO: 15.9 % — LOW (ref 20–55)
LYMPHOCYTES # BLD AUTO: 2.1 K/UL — SIGNIFICANT CHANGE UP (ref 1–4.8)
MCHC RBC-ENTMCNC: 25.6 PG — LOW (ref 27–31)
MCHC RBC-ENTMCNC: 32.2 G/DL — SIGNIFICANT CHANGE UP (ref 32–36)
MCV RBC AUTO: 79.4 FL — LOW (ref 81–99)
MONOCYTES # BLD AUTO: 0.6 K/UL — SIGNIFICANT CHANGE UP (ref 0–0.8)
MONOCYTES NFR BLD AUTO: 4.9 % — SIGNIFICANT CHANGE UP (ref 3–10)
NEUTROPHILS # BLD AUTO: 10.4 K/UL — HIGH (ref 1.8–8)
NEUTROPHILS NFR BLD AUTO: 78.4 % — HIGH (ref 37–73)
NITRITE UR-MCNC: NEGATIVE — SIGNIFICANT CHANGE UP
PH UR: 6.5 — SIGNIFICANT CHANGE UP (ref 5–8)
PLATELET # BLD AUTO: 255 K/UL — SIGNIFICANT CHANGE UP (ref 150–400)
POTASSIUM SERPL-MCNC: 4 MMOL/L — SIGNIFICANT CHANGE UP (ref 3.5–5.3)
POTASSIUM SERPL-SCNC: 4 MMOL/L — SIGNIFICANT CHANGE UP (ref 3.5–5.3)
PROT SERPL-MCNC: 8.3 G/DL — SIGNIFICANT CHANGE UP (ref 6.6–8.7)
PROT UR-MCNC: NEGATIVE MG/DL — SIGNIFICANT CHANGE UP
RBC # BLD: 4.96 M/UL — SIGNIFICANT CHANGE UP (ref 4.4–5.2)
RBC # FLD: 15.9 % — HIGH (ref 11–15.6)
SODIUM SERPL-SCNC: 138 MMOL/L — SIGNIFICANT CHANGE UP (ref 135–145)
SP GR SPEC: 1.01 — SIGNIFICANT CHANGE UP (ref 1.01–1.02)
UROBILINOGEN FLD QL: NEGATIVE MG/DL — SIGNIFICANT CHANGE UP
WBC # BLD: 13.2 K/UL — HIGH (ref 4.8–10.8)
WBC # FLD AUTO: 13.2 K/UL — HIGH (ref 4.8–10.8)

## 2019-01-04 PROCEDURE — 87086 URINE CULTURE/COLONY COUNT: CPT

## 2019-01-04 PROCEDURE — 96374 THER/PROPH/DIAG INJ IV PUSH: CPT | Mod: XU

## 2019-01-04 PROCEDURE — 74177 CT ABD & PELVIS W/CONTRAST: CPT

## 2019-01-04 PROCEDURE — 80053 COMPREHEN METABOLIC PANEL: CPT

## 2019-01-04 PROCEDURE — 76705 ECHO EXAM OF ABDOMEN: CPT | Mod: 26

## 2019-01-04 PROCEDURE — 85027 COMPLETE CBC AUTOMATED: CPT

## 2019-01-04 PROCEDURE — 99284 EMERGENCY DEPT VISIT MOD MDM: CPT

## 2019-01-04 PROCEDURE — 99284 EMERGENCY DEPT VISIT MOD MDM: CPT | Mod: 25

## 2019-01-04 PROCEDURE — 74177 CT ABD & PELVIS W/CONTRAST: CPT | Mod: 26

## 2019-01-04 PROCEDURE — 83690 ASSAY OF LIPASE: CPT

## 2019-01-04 PROCEDURE — 81003 URINALYSIS AUTO W/O SCOPE: CPT

## 2019-01-04 PROCEDURE — 99214 OFFICE O/P EST MOD 30 MIN: CPT

## 2019-01-04 PROCEDURE — 36415 COLL VENOUS BLD VENIPUNCTURE: CPT

## 2019-01-04 PROCEDURE — 76705 ECHO EXAM OF ABDOMEN: CPT

## 2019-01-04 RX ORDER — KETOROLAC TROMETHAMINE 30 MG/ML
15 SYRINGE (ML) INJECTION ONCE
Qty: 0 | Refills: 0 | Status: DISCONTINUED | OUTPATIENT
Start: 2019-01-04 | End: 2019-01-04

## 2019-01-04 RX ORDER — PRIMIDONE 50 MG/1
50 TABLET ORAL TWICE DAILY
Qty: 60 | Refills: 2 | Status: ACTIVE | COMMUNITY
Start: 2019-01-04 | End: 1900-01-01

## 2019-01-04 RX ADMIN — Medication 15 MILLIGRAM(S): at 17:59

## 2019-01-04 NOTE — ED ADULT NURSE NOTE - IN THE PAST 12 MONTHS HAVE YOU USED DRUGS OTHER THAN THOSE REQUIRED FOR MEDICAL REASON?
Discharge Information    IV Discontiued Time:  NA    Amount of Fluid Infused:  NA    Discharge Criteria = When patient returns to baseline or as per MD order    Consciousness:  Pt is fully awake    Circulation:  BP +/- 20% of pre-procedure level    Respiration:  Patient is able to breathe deeply    O2 Sat:  Patient is able to maintain O2 Sat >92% on room air    Activity:  Moves 4 extremities on command    Ambulation:  Patient is able to stand and walk or stand and pivot into wheelchair    Dressing:  Clean/dry or No Dressing    Notes:   Discharge instructions and AVS given to patient    Patient meets criteria for discharge?  YES    Admitted to PCU?  No    Responsible adult present to accompany patient home?  Yes    Signature/Title:    Patsy Maya  RN Care Coordinator  [facility]    
Pre-procedure Intake    Have you been fasting? Yes    If yes, for how long?     Are you taking a prescribed blood thinner such as coumadin, Plavix, Xarelto?    No    If yes, when did you take your last dose?     Do you take aspirin?  No    If cervical procedure, have you held aspirin for 6 days?   NA    Do you have any allergies to contrast dye, iodine, steroid and/or numbing medications?  NO    Are you currently taking antibiotics or have an active infection?  NO    Have you had a fever/elevated temperature within the past week? NO    Are you currently taking oral steroids? NO    Do you have a ? Yes       Are you pregnant or breastfeeding?  Not Applicable    Are the vital signs normal?  Yes      
No

## 2019-01-04 NOTE — DATA REVIEWED
[de-identified] : MRI of the brain was performed on 4/1/18.\par This study demonstrated small acute/subacute infarcts in the right corona radiata extending into the insular cortex and right frontal operculum. There was no hemorrhagic component.\par \par MRA of the head and neck from the same date was unremarkable.\par  [de-identified] : Lolita scan was normal on 12/19/18.

## 2019-01-04 NOTE — CONSULT LETTER
[Dear  ___] : Dear  [unfilled], [Courtesy Letter:] : I had the pleasure of seeing your patient, [unfilled], in my office today. [Please see my note below.] : Please see my note below. [Consult Closing:] : Thank you very much for allowing me to participate in the care of this patient.  If you have any questions, please do not hesitate to contact me. [Sincerely,] : Sincerely, [FreeTextEntry3] : Albaro Diamond MD.

## 2019-01-04 NOTE — HISTORY OF PRESENT ILLNESS
[FreeTextEntry1] : I saw this patient in the office today.\par \par As you recall, she had a stroke in April of 2018. She had presented with slurred speech at that time.\par A few months afterwards she began noticing some tremors in the right hand.\par This has been with her ever since.\par It waxes and wanes in intensity.\par It subsides with action and worsens at rest.\par She reports some associated weakness with this.\par She denies any balance difficulty.\par \par \par

## 2019-01-04 NOTE — PHYSICAL EXAM
[General Appearance - Alert] : alert [General Appearance - In No Acute Distress] : in no acute distress [Oriented To Time, Place, And Person] : oriented to person, place, and time [Memory Recent] : recent memory was not impaired [Memory Remote] : remote memory was not impaired [Cranial Nerves Optic (II)] : visual acuity intact bilaterally,  visual fields full to confrontation, pupils equal round and reactive to light [Cranial Nerves Oculomotor (III)] : extraocular motion intact [Cranial Nerves Trigeminal (V)] : facial sensation intact symmetrically [Cranial Nerves Facial (VII)] : face symmetrical [Cranial Nerves Vestibulocochlear (VIII)] : hearing was intact bilaterally [Cranial Nerves Glossopharyngeal (IX)] : tongue and palate midline [Cranial Nerves Accessory (XI - Cranial And Spinal)] : head turning and shoulder shrug symmetric [Cranial Nerves Hypoglossal (XII)] : there was no tongue deviation with protrusion [Motor Tone] : muscle tone was normal in all four extremities [Motor Strength] : muscle strength was normal in all four extremities [Sensation Tactile Decrease] : light touch was intact [Sensation Pain / Temperature Decrease] : pain and temperature was intact [Sensation Vibration Decrease] : vibration was intact [Tremor] : a tremor present [2+] : Patella left 2+ [PERRL With Normal Accommodation] : pupils were equal in size, round, reactive to light, with normal accommodation [Optic Disc Abnormality] : the optic disc were normal in size and color [Arterial Pulses Carotid] : carotid pulses were normal with no bruits [Edema] : there was no peripheral edema [Dysarthria] : no dysarthria [Aphasia] : no dysphasia/aphasia [Romberg's Sign] : Romberg's sign was negtive [Coordination - Dysmetria Impaired Finger-to-Nose Bilateral] : not present [Plantar Reflex Right Only] : normal on the right [Plantar Reflex Left Only] : normal on the left

## 2019-01-04 NOTE — ASSESSMENT
[FreeTextEntry1] : This is a 58 year-old woman with a history of recent stroke in April of 2018.\par I have recommended she continue the antiplatelet and statin medication.\par \par She now has findings and symptoms of tremor in the right upper extremity.\par Dopamine spectroscopy scan was normal.\par She has not yet gone for the thyroid function studies. I advised her to do so.\par \par I have suggested a trial of Mysoline starting at 50 mg twice per day.\par \par If there is no improvement then I will refer her to a movement disorder specialist.\par \par I will see her back in a 6-8 weeks.

## 2019-01-04 NOTE — CONSULT NOTE ADULT - SUBJECTIVE AND OBJECTIVE BOX
58 year old female w/multiple medical problems presenting to ED with 2 day history of R flank pain. Pt reports feeling nauseous on the way here and denies vomitting. Denies fever. Reports some urinary frequency but denies dysuria. Pain relieved with toradol.     VSS, NAD, A&ox3  CVS: S1, S2  Chest: BS BL  Abd: soft, NT   Ext: no edema     CT scan with IV contrast showing hyperdensity in R renal pelvis w/hydronephrosis but no definitive stone . Possible tumor ?                           12.7   13.2  )-----------( 255      ( 04 Jan 2019 16:43 )             39.4     01-04    138  |  100  |  8.0  ----------------------------<  169<H>  4.0   |  25.0  |  0.41<L>    Ca    9.3      04 Jan 2019 16:43    TPro  8.3  /  Alb  4.5  /  TBili  0.4  /  DBili  x   /  AST  18  /  ALT  28  /  AlkPhos  96  01-04

## 2019-01-04 NOTE — ED STATDOCS - ATTENDING CONTRIBUTION TO CARE
I, Bertrand Lei, performed the initial face to face bedside interview with this patient regarding history of present illness, review of symptoms and relevant past medical, social and family history.  I completed an independent physical examination.  I was the initial provider who evaluated this patient. I have signed out the follow up of any pending tests (i.e. labs, radiological studies) to the ACP.  I have communicated the patient’s plan of care and disposition with the ACP.

## 2019-01-04 NOTE — ED STATDOCS - OBJECTIVE STATEMENT
57 y/o F pt with hx of CAD, DM and HTN presents to ED with son at bedside c/o right flank pain, urinary frequency and nausea. Per son, patient has had sharp pain in right flank pain radiating to back, increased frequency and increased nausea since last night. No fever. No further complaints at this time.

## 2019-01-04 NOTE — ED STATDOCS - PROGRESS NOTE DETAILS
PA Note: PT evaluated by intake physician. HPI/PE/ROS as noted above. Will follow up plan per intake physician. Urinalysis wnl. No evidence of stone/cholecystitis on gallbladder ultrasound. Will do CT abd/pelvis to further evaluate. Urology ISABEL Javier with Dr. Campos consulted. Dr. Campos advised pt can be seen as an outpatient. Will give pt Motrin for pain and urology follow up.

## 2019-01-04 NOTE — CONSULT NOTE ADULT - ASSESSMENT
58 year old female w/flank pain now resolved - maybe secondary to stone not visualized on CT vs subacute tumor ? Does not seem to require admission, however if patient is being observed for pain control , would recommend a KUB to monitor dye movement. Otherwise, patient may f/u outpatient w/Dr. Campos for further workup.     Case DW Dr. Campos

## 2019-01-04 NOTE — ED ADULT NURSE NOTE - NSIMPLEMENTINTERV_GEN_ALL_ED
Implemented All Universal Safety Interventions:  [place] to call system. Call bell, personal items and telephone within reach. Instruct patient to call for assistance. Room bathroom lighting operational. Non-slip footwear when patient is off stretcher. Physically safe environment: no spills, clutter or unnecessary equipment. Stretcher in lowest position, wheels locked, appropriate side rails in place.

## 2019-01-05 LAB
CULTURE RESULTS: SIGNIFICANT CHANGE UP
SPECIMEN SOURCE: SIGNIFICANT CHANGE UP

## 2019-01-07 LAB
T3RU NFR SERPL: 1.06 INDEX
T4 SERPL-MCNC: 7.4 UG/DL
TSH SERPL-ACNC: 2.31 UIU/ML

## 2019-01-18 ENCOUNTER — OUTPATIENT (OUTPATIENT)
Dept: OUTPATIENT SERVICES | Facility: HOSPITAL | Age: 59
LOS: 1 days | End: 2019-01-18
Payer: MEDICAID

## 2019-01-18 ENCOUNTER — APPOINTMENT (OUTPATIENT)
Dept: UROLOGY | Facility: CLINIC | Age: 59
End: 2019-01-18

## 2019-01-18 DIAGNOSIS — R39.15 URGENCY OF URINATION: ICD-10-CM

## 2019-01-18 DIAGNOSIS — R35.0 FREQUENCY OF MICTURITION: ICD-10-CM

## 2019-01-18 DIAGNOSIS — Z98.890 OTHER SPECIFIED POSTPROCEDURAL STATES: Chronic | ICD-10-CM

## 2019-01-18 DIAGNOSIS — N28.89 OTHER SPECIFIED DISORDERS OF KIDNEY AND URETER: ICD-10-CM

## 2019-01-18 PROCEDURE — G0463: CPT | Mod: 25

## 2019-01-18 PROCEDURE — 51798 US URINE CAPACITY MEASURE: CPT

## 2019-01-18 NOTE — HISTORY OF PRESENT ILLNESS
[Urinary Incontinence] : urinary incontinence [Urinary Urgency] : urinary urgency [Urinary Frequency] : urinary frequency [Dysuria] : dysuria [Abdominal Pain] : abdominal pain [FreeTextEntry1] : 58F with PMHx of DM and stroke (April 2018) presented to the ED on 1/4/19 with complaints of RUQ pain.  Pt reports the pain had gradually increased to the point that it was difficult to move or bend over.  In the ED, a UA was sent that was essentially unremarkable.  A limited abdominal US was performed that showed mild R hydro without a clear etiology.  CT a/p with IV contrast showed mild distention of the R renal pelvis with a round hypodensity with subtle rim enhancement.  Unclear if this is a per-pelvic complex cyst, and duplicated collecting system, etc.  \par \par Pt reports her PCP placed her on a course of Levaquin and she was given tramadol for pain from the ED.  Her symptoms have no resolved.  Denies gross hematuria.  Reports that since her dx of DM (?2 years ago) she has had intermittent dysuria, urinary frequency (voiding q1hr and urgency and urge incontinence.  No smoking or secondhand exposure hx.  No exposure risk factors.  Drink one cup of tea in the AM.  No family hx of  related malignancies.  \par .\par  [Urinary Retention] : no urinary retention [Hematuria - Gross] : no gross hematuria [Hematuria - Microscopic] : no microscopic hematuria [Flank Pain] : no flank pain

## 2019-01-18 NOTE — PHYSICAL EXAM
[General Appearance - Well Developed] : well developed [General Appearance - Well Nourished] : well nourished [Normal Appearance] : normal appearance [Well Groomed] : well groomed [General Appearance - In No Acute Distress] : no acute distress [Edema] : no peripheral edema [] : no respiratory distress [Exaggerated Use Of Accessory Muscles For Inspiration] : no accessory muscle use [Abdomen Soft] : soft [Abdomen Tenderness] : non-tender [Abdomen Mass (___ Cm)] : no abdominal mass palpated [Abdomen Hernia] : no hernia was discovered [Costovertebral Angle Tenderness] : no ~M costovertebral angle tenderness [Normal Station and Gait] : the gait and station were normal for the patient's age [Oriented To Time, Place, And Person] : oriented to person, place, and time [Affect] : the affect was normal [Mood] : the mood was normal [Not Anxious] : not anxious [FreeTextEntry1] : resting tremor of RUE

## 2019-01-18 NOTE — ASSESSMENT
[FreeTextEntry1] : 58F with ? R complex renal cyst vs mass vs duplicated collecting system.  Also with urinary frequency, urgency, and urge incontinence in the setting of DM and recent stroke.  PVR = 0cc\par \par - UA & Cx to rule our infection and microscopic hematuria\par - MR Urogram to better characterize kidney lesion (spoke with radiology and they believe MR would yield more information than CT urogram)\par - Schedule for UDS \par \par

## 2019-01-22 DIAGNOSIS — N28.89 OTHER SPECIFIED DISORDERS OF KIDNEY AND URETER: ICD-10-CM

## 2019-01-22 DIAGNOSIS — R39.15 URGENCY OF URINATION: ICD-10-CM

## 2019-01-23 LAB
APPEARANCE: CLEAR
BACTERIA UR CULT: NORMAL
BACTERIA: NEGATIVE
BILIRUBIN URINE: NEGATIVE
BLOOD URINE: NEGATIVE
CALCIUM OXALATE CRYSTALS: ABNORMAL
COLOR: YELLOW
GLUCOSE QUALITATIVE U: NEGATIVE MG/DL
HYALINE CASTS: 3 /LPF
KETONES URINE: NEGATIVE
LEUKOCYTE ESTERASE URINE: NEGATIVE
MICROSCOPIC-UA: NORMAL
NITRITE URINE: NEGATIVE
PH URINE: 6
PROTEIN URINE: NEGATIVE MG/DL
RED BLOOD CELLS URINE: 2 /HPF
SPECIFIC GRAVITY URINE: 1.02
SQUAMOUS EPITHELIAL CELLS: 2 /HPF
UROBILINOGEN URINE: NEGATIVE MG/DL
WHITE BLOOD CELLS URINE: 1 /HPF

## 2019-01-25 ENCOUNTER — APPOINTMENT (OUTPATIENT)
Dept: MRI IMAGING | Facility: CLINIC | Age: 59
End: 2019-01-25

## 2019-02-02 ENCOUNTER — APPOINTMENT (OUTPATIENT)
Dept: MRI IMAGING | Facility: CLINIC | Age: 59
End: 2019-02-02

## 2019-02-08 ENCOUNTER — APPOINTMENT (OUTPATIENT)
Dept: UROLOGY | Facility: CLINIC | Age: 59
End: 2019-02-08

## 2019-03-01 ENCOUNTER — APPOINTMENT (OUTPATIENT)
Dept: NEUROLOGY | Facility: CLINIC | Age: 59
End: 2019-03-01

## 2019-07-29 ENCOUNTER — EMERGENCY (EMERGENCY)
Facility: HOSPITAL | Age: 59
LOS: 1 days | Discharge: HOME | End: 2019-07-29
Attending: EMERGENCY MEDICINE
Payer: SELF-PAY

## 2019-07-29 VITALS
HEIGHT: 63 IN | DIASTOLIC BLOOD PRESSURE: 62 MMHG | TEMPERATURE: 98 F | HEART RATE: 69 BPM | WEIGHT: 139.99 LBS | OXYGEN SATURATION: 99 % | RESPIRATION RATE: 18 BRPM | SYSTOLIC BLOOD PRESSURE: 127 MMHG

## 2019-07-29 DIAGNOSIS — Z98.890 OTHER SPECIFIED POSTPROCEDURAL STATES: Chronic | ICD-10-CM

## 2019-07-29 PROCEDURE — 99283 EMERGENCY DEPT VISIT LOW MDM: CPT

## 2019-07-29 RX ORDER — IBUPROFEN 200 MG
600 TABLET ORAL ONCE
Refills: 0 | Status: DISCONTINUED | OUTPATIENT
Start: 2019-07-29 | End: 2019-08-09

## 2019-07-29 RX ORDER — OFLOXACIN 0.3 %
10 DROPS OPHTHALMIC (EYE) ONCE
Refills: 0 | Status: COMPLETED | OUTPATIENT
Start: 2019-07-29 | End: 2019-07-29

## 2019-07-29 RX ORDER — OFLOXACIN OTIC SOLUTION 3 MG/ML
10 SOLUTION/ DROPS AURICULAR (OTIC)
Qty: 1 | Refills: 0
Start: 2019-07-29 | End: 2019-08-04

## 2019-07-29 RX ORDER — AMOXICILLIN 250 MG/5ML
1 SUSPENSION, RECONSTITUTED, ORAL (ML) ORAL
Qty: 14 | Refills: 0
Start: 2019-07-29 | End: 2019-08-04

## 2019-07-29 RX ORDER — OFLOXACIN OTIC SOLUTION 3 MG/ML
10 SOLUTION/ DROPS AURICULAR (OTIC) ONCE
Refills: 0 | Status: DISCONTINUED | OUTPATIENT
Start: 2019-07-29 | End: 2019-07-29

## 2019-07-29 RX ADMIN — Medication 10 DROP(S): at 16:51

## 2019-07-29 NOTE — ED PROVIDER NOTE - ATTENDING CONTRIBUTION TO CARE
Georgette: I performed a face to face bedside interview with patient regarding history of present illness, review of symptoms and past medical history. I completed an independent physical exam.  I have discussed patient's plan of care with advanced care provider.   I agree with note as stated above including HISTORY OF PRESENT ILLNESS, HIV, PAST MEDICAL/SURGICAL/FAMILY/SOCIAL HISTORY, ALLERGIES AND HOME MEDICATIONS, REVIEW OF SYSTEMS, PHYSICAL EXAM, MEDICAL DECISION MAKING and any PROGRESS NOTES during the time I functioned as the attending physician for this patient  unless otherwise noted. My brief assessment is as follows: several days right ear pain with some small discharge, no ttp over mastoid, no f/c. uses hearing aid, sometime with moisture in ear. right external ear canal with swelling/erythema,  limited visualization of tm. suspect otitis externa. ciprodex with wick and po abx given severity. ent f/u and return precauitons.

## 2019-07-29 NOTE — ED PROVIDER NOTE - OBJECTIVE STATEMENT
Pt is 60 y/o F, no PMH, presents to ED c/o R sided ear pain x 1 day. Pt sates pain is worsened with palpation. Pt also with + white discharge from R ear. Pt has been afebrile since onset of symptoms. Pt has had no hearing loss. Pt states she frequently places a hearing aide in her R ear. Pt denies q-tip use, nausea, vomiting, HA, dizziness, recent swimming.

## 2019-07-29 NOTE — ED PROVIDER NOTE - CLINICAL SUMMARY MEDICAL DECISION MAKING FREE TEXT BOX
+ Otitis externa. No inner ear involvement. Will treat with ofloxacin drops. Ear wick placed at bedside.  Course of amoxicillin ordered and pt educated to was 2 days and monitor infection and only use abx if symptoms persist. Pt given follow up information for Dr. Horan for close follow up and wick removal. Pt educated to return to ED if pain worsens or she develops fevers.

## 2019-07-29 NOTE — ED ADULT TRIAGE NOTE - CHIEF COMPLAINT QUOTE
Patient arrived ambulatory to ED, awake alert, and oriented times 3, breathing unlabored.  Patient complaining of right ear pain since yesterday.  Took tylenol today at 11:00

## 2019-07-29 NOTE — ED PROVIDER NOTE - PHYSICAL EXAMINATION
Ears: + swollen erythematous external auditory canal R ear. R TM partially visualized ( gray, no perforation ). L ear WNL    Head: No mastoid process tenderness

## 2019-08-28 ENCOUNTER — APPOINTMENT (OUTPATIENT)
Dept: OTOLARYNGOLOGY | Facility: CLINIC | Age: 59
End: 2019-08-28

## 2019-11-14 NOTE — ED ADULT NURSE NOTE - CHIEF COMPLAINT QUOTE
pt arrives w/ c./o heart racing. EMS states pt in SVT gvn 6 Adenosine arrives w/ 18 gauge to LAC with NS infusing. pt states feel like her heart is still racing. EKG in progress HR low 100s. Estimated Blood Loss (Cc): minimal

## 2019-12-26 ENCOUNTER — APPOINTMENT (OUTPATIENT)
Dept: NEUROLOGY | Facility: CLINIC | Age: 59
End: 2019-12-26

## 2020-04-28 NOTE — ED ADULT NURSE REASSESSMENT NOTE - NIH STROKE SCALE: 7. LIMB ATAXIA, QM
pt c/o worsening midsternal chest pain and SOB that began Sunday. Was evaluated at urgent care yesterday where she was tested for covid (results pending) and prescribed inhaler and abx. Used Proair 2x, last time @1730 PTA. associated symptoms include vomiting, chills and temp of 99.3 at home. Has not taken pain meds or antipyretics today.
(0) Absent

## 2020-08-03 ENCOUNTER — APPOINTMENT (OUTPATIENT)
Dept: ORTHOPEDIC SURGERY | Facility: CLINIC | Age: 60
End: 2020-08-03
Payer: MEDICAID

## 2020-08-03 VITALS
SYSTOLIC BLOOD PRESSURE: 125 MMHG | HEIGHT: 63 IN | TEMPERATURE: 99.8 F | HEART RATE: 71 BPM | DIASTOLIC BLOOD PRESSURE: 78 MMHG | WEIGHT: 143 LBS | BODY MASS INDEX: 25.34 KG/M2

## 2020-08-03 DIAGNOSIS — Z78.9 OTHER SPECIFIED HEALTH STATUS: ICD-10-CM

## 2020-08-03 PROCEDURE — 99203 OFFICE O/P NEW LOW 30 MIN: CPT

## 2020-08-03 PROCEDURE — 73030 X-RAY EXAM OF SHOULDER: CPT | Mod: LT

## 2020-08-10 NOTE — REVIEW OF SYSTEMS
[Sleep Disturbances] : ~T sleep disturbances [Headache] : headache [Feeling Weak] : feeling weak [Muscle Weakness] : muscle weakness [Negative] : Heme/Lymph

## 2020-08-11 NOTE — DISCUSSION/SUMMARY
[de-identified] : At this time, due to impingement syndrome of left shoulder, I recommended ice, anti-inflammatory, topical cream and physical therapy.  I have advised her to speak to her primary care doctor.  I will reassess her in two weeks.  If she is able to, I would like to give her a subacromial cortisone injection.

## 2020-08-11 NOTE — CONSULT LETTER
[Consult Letter:] : I had the pleasure of evaluating your patient, [unfilled]. [Dear  ___] : Dear  [unfilled], [Consult Closing:] : Thank you very much for allowing me to participate in the care of this patient.  If you have any questions, please do not hesitate to contact me. [Please see my note below.] : Please see my note below. [Sincerely,] : Sincerely, [FreeTextEntry3] : Robles Morse III, MD\par [facility]/jayant

## 2020-08-11 NOTE — HISTORY OF PRESENT ILLNESS
[(no relief)  0] : the relief from treatment is 0/10 (no relief) [5] : the ailment interference is 5/10 [4] : the ailment interference is 4/10 [9] : the ailment interference is 9/10 [(Does not interfere) 0] : the ailment interference is 0/10 (does not interfere) [de-identified] : The patient comes in today with a month history of complaints of pain to her left shoulder.  Due to COVID, she was unable to get it managed.  She comes in today for evaluation. The patient describes the pain as cramping and she notes that she can't even move.  The patient notes heat makes her symptoms better, while bending and lying down makes her symptoms worse.  The patient indicates a pain level of 9 on a pain scale of 0-10.  [] : No [de-identified] : Physical therapy

## 2020-08-11 NOTE — ADDENDUM
[FreeTextEntry1] : This note was written by Gavi Baptiste on 08/10/2020 acting as a scribe for ALYSA BRAND III, MD

## 2020-08-11 NOTE — PHYSICAL EXAM
[de-identified] : Right Shoulder: \par Range of Motion in Degrees:   	\par    	                        Claimant:          Normal:  	\par Abduction (Active)  	180  	180 degrees  	\par Abduction (Passive)  	180  	180 degrees  	\par Forward elevation (Active):  	180  	180 degrees  	\par Forward elevation (Passive):  180  	180 degrees  	\par External rotation (Active):  	45  	45 degrees  	\par External rotation (Passive):  	45  	45 degrees  	\par Internal rotation (Active):  	L-1  	L-1  	\par Internal rotation (Passive):  	L-1  	L-1  	\par \par No motor weakness to internal rotation, external rotation or abduction in the scapular plane.  Negative crank test.  Negative O’Hadley’s test.  Negative Speed’s test. Negative Yergason’s test.  Negative cross arm test.  No tenderness to palpation at the AC joint. Negative Hawkin’s sign.  Negative Neer’s sign.  Negative apprehension. Negative sulcus sign.  No gross neurological or vascular deficits distally.  Skin is intact.  No rashes, scars or lesions. 2+ radial and ulnar pulses. No extra-articular swelling or tenderness. \par \par Left Shoulder:  \par Range of Motion in Degrees:	\par 	                                  Claimant:	Normal:	\par Abduction (Active)	                  180	               180 degrees	\par Abduction (Passive)	  180	               180 degrees	\par Forward elevation (Active):	  180	               180 degrees	\par Forward elevation (Passive):	  180	               180 degrees	\par External rotation (Active):	   45	               45 degrees	\par External rotation (Passive):	   45	               45 degrees	\par Internal rotation (Active):	   L-1	               L-1	\par Internal rotation (Passive):	   L-1	               L-1	\par  \par No motor weakness to internal rotation, external rotation or abduction in the scapular plane.  Negative crank test.  Negative O’Hadley’s test.  Negative Speed’s test. Negative Yergason’s test.  Negative cross arm test.  No tenderness to palpation at the AC joint. Positive Hawkin’s sign.  Positive Neer’s sign. Negative apprehension. Negative sulcus sign.  No gross neurological or vascular deficits distally.  Skin is intact.  No rashes, scars or lesions. 2+ radial and ulnar pulses. No extra-articular swelling or tenderness.\par   [de-identified] : Appearance:  Well-developed, well-nourished female in no acute distress.\par   [de-identified] : Ambulating with a normal gait.  Station:  Normal.  [de-identified] : Review of her MRI is consistent with a partial tearing of the supraspinatus.\par \par Radiographs, two views of the left shoulder, show no obvious osseous abnormality.

## 2020-08-17 ENCOUNTER — APPOINTMENT (OUTPATIENT)
Dept: ORTHOPEDIC SURGERY | Facility: CLINIC | Age: 60
End: 2020-08-17
Payer: MEDICAID

## 2020-08-17 VITALS
HEIGHT: 63 IN | TEMPERATURE: 98.2 F | DIASTOLIC BLOOD PRESSURE: 76 MMHG | WEIGHT: 143 LBS | HEART RATE: 75 BPM | SYSTOLIC BLOOD PRESSURE: 128 MMHG | BODY MASS INDEX: 25.34 KG/M2

## 2020-08-17 DIAGNOSIS — M75.42 IMPINGEMENT SYNDROME OF LEFT SHOULDER: ICD-10-CM

## 2020-08-17 PROCEDURE — 99213 OFFICE O/P EST LOW 20 MIN: CPT

## 2020-08-25 NOTE — ADDENDUM
[FreeTextEntry1] : This note was written by Gavi Baptiste on 08/25/2020 acting as a scribe for ALYSA BRAND III, MD

## 2020-08-25 NOTE — PHYSICAL EXAM
[de-identified] : Left Shoulder: \par Range of Motion in Degrees:	\par 	   Claimant:	Normal:	\par Abduction (Active)	  180	 180 degrees	\par Abduction (Passive)	 180	 180 degrees	\par Forward elevation (Active):	 180	 180 degrees	\par Forward elevation (Passive):	 180	 180 degrees	\par External rotation (Active):	 45	 45 degrees	\par External rotation (Passive):	 45	 45 degrees	\par Internal rotation (Active):	 L-1	 L-1	\par Internal rotation (Passive):	 L-1	 L-1	\par  \par No motor weakness to internal rotation, external rotation or abduction in the scapular plane. Negative crank test. Negative O’Hadley’s test. Negative Speed’s test. Negative Yergason’s test. Negative cross arm test. No tenderness to palpation at the AC joint. Positive Hawkin’s sign. Positive Neer’s sign. Negative apprehension. Negative sulcus sign. No gross neurological or vascular deficits distally. Skin is intact. No rashes, scars or lesions. 2+ radial and ulnar pulses. No extra-articular swelling or tenderness.\par  [de-identified] : Ambulating with a slightly antalgic to antalgic gait.  Station:  Normal.  [de-identified] : Appearance:  Well-developed, well-nourished female in no acute distress.\par

## 2020-08-25 NOTE — HISTORY OF PRESENT ILLNESS
[de-identified] : The patient comes in today with some persistent complaints to her left shoulder.  According to her daughter, the primary care doctor did not allow her to have any cortisone injection.

## 2020-08-25 NOTE — DISCUSSION/SUMMARY
[de-identified] : At this time, due to impingement syndrome of the left shoulder, she is started on physical therapy.  She will be reassessed in four to six weeks.

## 2021-01-12 ENCOUNTER — EMERGENCY (EMERGENCY)
Facility: HOSPITAL | Age: 61
LOS: 1 days | Discharge: HOME | End: 2021-01-12
Attending: STUDENT IN AN ORGANIZED HEALTH CARE EDUCATION/TRAINING PROGRAM
Payer: SELF-PAY

## 2021-01-12 VITALS
OXYGEN SATURATION: 98 % | HEIGHT: 63 IN | DIASTOLIC BLOOD PRESSURE: 94 MMHG | TEMPERATURE: 98 F | HEART RATE: 83 BPM | RESPIRATION RATE: 18 BRPM | SYSTOLIC BLOOD PRESSURE: 161 MMHG

## 2021-01-12 DIAGNOSIS — Z98.890 OTHER SPECIFIED POSTPROCEDURAL STATES: Chronic | ICD-10-CM

## 2021-01-12 PROCEDURE — 99285 EMERGENCY DEPT VISIT HI MDM: CPT

## 2021-01-12 RX ORDER — SODIUM CHLORIDE 9 MG/ML
1000 INJECTION INTRAMUSCULAR; INTRAVENOUS; SUBCUTANEOUS ONCE
Refills: 0 | Status: COMPLETED | OUTPATIENT
Start: 2021-01-12 | End: 2021-01-12

## 2021-01-12 RX ORDER — IOHEXOL 300 MG/ML
30 INJECTION, SOLUTION INTRAVENOUS ONCE
Refills: 0 | Status: COMPLETED | OUTPATIENT
Start: 2021-01-12 | End: 2021-01-13

## 2021-01-12 NOTE — ED PROVIDER NOTE - MUSCULOSKELETAL, MLM
(+) lower paralumbar tenderness, pine appears normal, range of motion is not limited, no joint tenderness

## 2021-01-12 NOTE — ED PROVIDER NOTE - CONSTITUTIONAL, MLM
normal... (+)mild tremor to right arm states chronic form stroke 2 years ago, well appearing, awake, alert, oriented to person, place, time/situation and in no apparent distress.

## 2021-01-12 NOTE — ED PROVIDER NOTE - NSFOLLOWUPINSTRUCTIONS_ED_ALL_ED_FT
1) Follow up with your doctor in 1-2 days  2) Return to the ER for worsening or concerning symptoms  3) Take medication as prescribed  4) take acetaminophen and/or ibuprofen for pain control      Urinary Tract Infection in Women    WHAT YOU NEED TO KNOW:    A urinary tract infection (UTI) is caused by bacteria that get inside your urinary tract. Most bacteria that enter your urinary tract come out when you urinate. If the bacteria stay in your urinary tract, you may get an infection. Your urinary tract includes your kidneys, ureters, bladder, and urethra. Urine is made in your kidneys, and it flows from the ureters to the bladder. Urine leaves the bladder through the urethra. A UTI is more common in your lower urinary tract, which includes your bladder and urethra.     Female Urinary System         DISCHARGE INSTRUCTIONS:    Return to the emergency department if:   •You are urinating very little or not at all.      •You have a high fever with shaking chills.       •You have side or back pain that gets worse.      Call your doctor if:   •You have a fever.      •You do not feel better after 2 days of taking antibiotics.      •You are vomiting.       •You have questions or concerns about your condition or care.      Medicines:   •Antibiotics help fight a bacterial infection. If you have UTIs often (called recurrent UTIs), you may be given antibiotics to take regularly. You will be given directions for when and how to use antibiotics. The goal is to prevent UTIs but not cause antibiotic resistance by using antibiotics too often.      •Medicines may be given to decrease pain and burning when you urinate. They will also help decrease the feeling that you need to urinate often. These medicines will make your urine orange or red.      •Take your medicine as directed. Contact your healthcare provider if you think your medicine is not helping or if you have side effects. Tell him or her if you are allergic to any medicine. Keep a list of the medicines, vitamins, and herbs you take. Include the amounts, and when and why you take them. Bring the list or the pill bottles to follow-up visits. Carry your medicine list with you in case of an emergency.      Follow up with your healthcare provider as directed: Write down your questions so you remember to ask them during your visits.     Prevent another UTI:   •Empty your bladder often. Urinate and empty your bladder as soon as you feel the need. Do not hold your urine for long periods of time.      •Wipe from front to back after you urinate or have a bowel movement. This will help prevent germs from getting into your urinary tract through your urethra.      •Drink liquids as directed. Ask how much liquid to drink each day and which liquids are best for you. You may need to drink more liquids than usual to help flush out the bacteria. Do not drink alcohol, caffeine, or citrus juices. These can irritate your bladder and increase your symptoms. Your healthcare provider may recommend cranberry juice to help prevent a UTI.      •Urinate after you have sex. This can help flush out bacteria passed during sex.      •Do not douche or use feminine deodorants. These can change the chemical balance in your vagina.      •Change sanitary pads or tampons often. This will help prevent germs from getting into your urinary tract.       •Talk to your healthcare provider about your birth control method. You may need to change your method if it is increasing your risk for UTIs.      •Wear cotton underwear and clothes that are loose. Tight pants and nylon underwear can trap moisture and cause bacteria to grow.      •Vaginal estrogen may be recommended. This medicine helps prevent UTIs in women who have gone through menopause or are in madi-menopause.      •Do pelvic muscle exercises often. Pelvic muscle exercises may help you start and stop urinating. Strong pelvic muscles may help you empty your bladder easier. Squeeze these muscles tightly for 5 seconds like you are trying to hold back urine. Then relax for 5 seconds. Gradually work up to squeezing for 10 seconds. Do 3 sets of 15 repetitions a day, or as directed.

## 2021-01-12 NOTE — ED PROVIDER NOTE - OBJECTIVE STATEMENT
60y female pt with pmhx of appendicitis, mini stroke, diabetes, HLD, HTN, CAD presents to ED c/o lower abd pain. Pt states she is having LLQ pain x3 days. Pt states the pain is constant but sometimes worsening. Pt states the pain radiates to her back. Pt states she has some dizziness as well.     pt denies difficulty urinating/bowel movements, fever, vomiting

## 2021-01-12 NOTE — ED PROVIDER NOTE - PROGRESS NOTE DETAILS
Patient feeling better. Labs and CT are as noted. Symptoms possible related to a cystitis. Will treat accordingly.

## 2021-01-12 NOTE — ED PROVIDER NOTE - PATIENT PORTAL LINK FT
You can access the FollowMyHealth Patient Portal offered by [facility] by registering at the following website: [email or web address]. By joining [employer]’s FollowMyHealth portal, you will also be able to view your health information using other applications (apps) compatible with our system.

## 2021-01-13 VITALS
OXYGEN SATURATION: 98 % | TEMPERATURE: 98 F | HEART RATE: 78 BPM | SYSTOLIC BLOOD PRESSURE: 118 MMHG | DIASTOLIC BLOOD PRESSURE: 79 MMHG | RESPIRATION RATE: 18 BRPM

## 2021-01-13 LAB
ALBUMIN SERPL ELPH-MCNC: 4 G/DL — SIGNIFICANT CHANGE UP (ref 3.3–5.2)
ALP SERPL-CCNC: 118 U/L — SIGNIFICANT CHANGE UP (ref 40–120)
ALT FLD-CCNC: 30 U/L — SIGNIFICANT CHANGE UP
ANION GAP SERPL CALC-SCNC: 10 MMOL/L — SIGNIFICANT CHANGE UP (ref 5–17)
APPEARANCE UR: CLEAR — SIGNIFICANT CHANGE UP
AST SERPL-CCNC: 36 U/L — HIGH
BASOPHILS # BLD AUTO: 0.07 K/UL — SIGNIFICANT CHANGE UP (ref 0–0.2)
BASOPHILS NFR BLD AUTO: 0.7 % — SIGNIFICANT CHANGE UP (ref 0–2)
BILIRUB SERPL-MCNC: 0.3 MG/DL — LOW (ref 0.4–2)
BILIRUB UR-MCNC: NEGATIVE — SIGNIFICANT CHANGE UP
BUN SERPL-MCNC: 17 MG/DL — SIGNIFICANT CHANGE UP (ref 8–20)
CALCIUM SERPL-MCNC: 9.4 MG/DL — SIGNIFICANT CHANGE UP (ref 8.6–10.2)
CHLORIDE SERPL-SCNC: 103 MMOL/L — SIGNIFICANT CHANGE UP (ref 98–107)
CO2 SERPL-SCNC: 26 MMOL/L — SIGNIFICANT CHANGE UP (ref 22–29)
COLOR SPEC: YELLOW — SIGNIFICANT CHANGE UP
CREAT SERPL-MCNC: 0.45 MG/DL — LOW (ref 0.5–1.3)
DIFF PNL FLD: ABNORMAL
EOSINOPHIL # BLD AUTO: 0.19 K/UL — SIGNIFICANT CHANGE UP (ref 0–0.5)
EOSINOPHIL NFR BLD AUTO: 1.9 % — SIGNIFICANT CHANGE UP (ref 0–6)
EPI CELLS # UR: SIGNIFICANT CHANGE UP
GLUCOSE SERPL-MCNC: 164 MG/DL — HIGH (ref 70–99)
GLUCOSE UR QL: NEGATIVE MG/DL — SIGNIFICANT CHANGE UP
HCT VFR BLD CALC: 40.6 % — SIGNIFICANT CHANGE UP (ref 34.5–45)
HGB BLD-MCNC: 12.7 G/DL — SIGNIFICANT CHANGE UP (ref 11.5–15.5)
IMM GRANULOCYTES NFR BLD AUTO: 0.2 % — SIGNIFICANT CHANGE UP (ref 0–1.5)
KETONES UR-MCNC: NEGATIVE — SIGNIFICANT CHANGE UP
LEUKOCYTE ESTERASE UR-ACNC: ABNORMAL
LIDOCAIN IGE QN: 65 U/L — HIGH (ref 22–51)
LYMPHOCYTES # BLD AUTO: 3.41 K/UL — HIGH (ref 1–3.3)
LYMPHOCYTES # BLD AUTO: 34 % — SIGNIFICANT CHANGE UP (ref 13–44)
MCHC RBC-ENTMCNC: 24.8 PG — LOW (ref 27–34)
MCHC RBC-ENTMCNC: 31.3 GM/DL — LOW (ref 32–36)
MCV RBC AUTO: 79.3 FL — LOW (ref 80–100)
MONOCYTES # BLD AUTO: 0.74 K/UL — SIGNIFICANT CHANGE UP (ref 0–0.9)
MONOCYTES NFR BLD AUTO: 7.4 % — SIGNIFICANT CHANGE UP (ref 2–14)
NEUTROPHILS # BLD AUTO: 5.6 K/UL — SIGNIFICANT CHANGE UP (ref 1.8–7.4)
NEUTROPHILS NFR BLD AUTO: 55.8 % — SIGNIFICANT CHANGE UP (ref 43–77)
NITRITE UR-MCNC: NEGATIVE — SIGNIFICANT CHANGE UP
PH UR: 6 — SIGNIFICANT CHANGE UP (ref 5–8)
PLATELET # BLD AUTO: 246 K/UL — SIGNIFICANT CHANGE UP (ref 150–400)
POTASSIUM SERPL-MCNC: 5.3 MMOL/L — SIGNIFICANT CHANGE UP (ref 3.5–5.3)
POTASSIUM SERPL-SCNC: 5.3 MMOL/L — SIGNIFICANT CHANGE UP (ref 3.5–5.3)
PROT SERPL-MCNC: 7.8 G/DL — SIGNIFICANT CHANGE UP (ref 6.6–8.7)
PROT UR-MCNC: 15 MG/DL
RBC # BLD: 5.12 M/UL — SIGNIFICANT CHANGE UP (ref 3.8–5.2)
RBC # FLD: 16.5 % — HIGH (ref 10.3–14.5)
RBC CASTS # UR COMP ASSIST: SIGNIFICANT CHANGE UP /HPF (ref 0–4)
SODIUM SERPL-SCNC: 139 MMOL/L — SIGNIFICANT CHANGE UP (ref 135–145)
SP GR SPEC: 1.02 — SIGNIFICANT CHANGE UP (ref 1.01–1.02)
UROBILINOGEN FLD QL: NEGATIVE MG/DL — SIGNIFICANT CHANGE UP
WBC # BLD: 10.03 K/UL — SIGNIFICANT CHANGE UP (ref 3.8–10.5)
WBC # FLD AUTO: 10.03 K/UL — SIGNIFICANT CHANGE UP (ref 3.8–10.5)
WBC UR QL: SIGNIFICANT CHANGE UP

## 2021-01-13 PROCEDURE — 96374 THER/PROPH/DIAG INJ IV PUSH: CPT | Mod: XU

## 2021-01-13 PROCEDURE — 74177 CT ABD & PELVIS W/CONTRAST: CPT

## 2021-01-13 PROCEDURE — 99284 EMERGENCY DEPT VISIT MOD MDM: CPT | Mod: 25

## 2021-01-13 PROCEDURE — 74177 CT ABD & PELVIS W/CONTRAST: CPT | Mod: 26

## 2021-01-13 PROCEDURE — 36415 COLL VENOUS BLD VENIPUNCTURE: CPT

## 2021-01-13 PROCEDURE — 81001 URINALYSIS AUTO W/SCOPE: CPT

## 2021-01-13 PROCEDURE — 85025 COMPLETE CBC W/AUTO DIFF WBC: CPT

## 2021-01-13 PROCEDURE — 83690 ASSAY OF LIPASE: CPT

## 2021-01-13 PROCEDURE — 80053 COMPREHEN METABOLIC PANEL: CPT

## 2021-01-13 RX ORDER — NITROFURANTOIN MACROCRYSTAL 50 MG
100 CAPSULE ORAL ONCE
Refills: 0 | Status: COMPLETED | OUTPATIENT
Start: 2021-01-13 | End: 2021-01-13

## 2021-01-13 RX ORDER — METHENAMINE MANDELATE 1 G
1 TABLET ORAL
Qty: 14 | Refills: 0
Start: 2021-01-13 | End: 2021-01-19

## 2021-01-13 RX ORDER — KETOROLAC TROMETHAMINE 30 MG/ML
15 SYRINGE (ML) INJECTION ONCE
Refills: 0 | Status: DISCONTINUED | OUTPATIENT
Start: 2021-01-13 | End: 2021-01-13

## 2021-01-13 RX ADMIN — IOHEXOL 30 MILLILITER(S): 300 INJECTION, SOLUTION INTRAVENOUS at 01:11

## 2021-01-13 RX ADMIN — SODIUM CHLORIDE 1000 MILLILITER(S): 9 INJECTION INTRAMUSCULAR; INTRAVENOUS; SUBCUTANEOUS at 01:11

## 2021-01-13 RX ADMIN — Medication 15 MILLIGRAM(S): at 01:53

## 2021-01-13 RX ADMIN — Medication 100 MILLIGRAM(S): at 05:15

## 2021-05-11 NOTE — ED ADULT NURSE NOTE - PMH
CAD (coronary artery disease)    DM (diabetes mellitus)    Hyperlipidemia    Hypertension    Mini stroke no

## 2021-08-06 NOTE — ED ADULT NURSE NOTE - TOBACCO USE
[facility] Main Line: [phone]    Call triage nurse with chills and/or temperature greater than or equal to 100.4, uncontrolled nausea/vomiting, diarrhea, constipation, dizziness, shortness of breath, chest pain, bleeding, unexplained bruising, or any new/concerning symptoms, questions/concerns.   If you are having any concerning symptoms or wish to speak to a provider before your next infusion visit, please call your care coordinator or triage to notify them so we can adequately serve you.   Triage Nurse Line: [phone]    If after hours, weekends, or holidays [phone]         
Never smoker

## 2022-01-16 NOTE — ED PROVIDER NOTE - NSCAREINITIATED _GEN_ER
Wilfredo Riggins(Resident)
Pt c/o R sided neck pain X 2 days, says it might be from the trauma 2 years ago.

## 2022-05-10 ENCOUNTER — EMERGENCY (EMERGENCY)
Facility: HOSPITAL | Age: 62
LOS: 1 days | Discharge: HOME | End: 2022-05-10
Attending: EMERGENCY MEDICINE
Payer: MEDICAID

## 2022-05-10 VITALS
RESPIRATION RATE: 18 BRPM | SYSTOLIC BLOOD PRESSURE: 151 MMHG | HEIGHT: 63 IN | TEMPERATURE: 99 F | WEIGHT: 156.75 LBS | OXYGEN SATURATION: 98 % | DIASTOLIC BLOOD PRESSURE: 84 MMHG | HEART RATE: 90 BPM

## 2022-05-10 VITALS
DIASTOLIC BLOOD PRESSURE: 70 MMHG | SYSTOLIC BLOOD PRESSURE: 147 MMHG | RESPIRATION RATE: 18 BRPM | TEMPERATURE: 98 F | OXYGEN SATURATION: 100 % | HEART RATE: 71 BPM

## 2022-05-10 DIAGNOSIS — Z98.890 OTHER SPECIFIED POSTPROCEDURAL STATES: Chronic | ICD-10-CM

## 2022-05-10 LAB
ALBUMIN SERPL ELPH-MCNC: 4 G/DL — SIGNIFICANT CHANGE UP (ref 3.3–5.2)
ALP SERPL-CCNC: 145 U/L — HIGH (ref 40–120)
ALT FLD-CCNC: 7 U/L — SIGNIFICANT CHANGE UP
ANION GAP SERPL CALC-SCNC: 10 MMOL/L — SIGNIFICANT CHANGE UP (ref 5–17)
APTT BLD: 32.1 SEC — SIGNIFICANT CHANGE UP (ref 27.5–35.5)
AST SERPL-CCNC: 21 U/L — SIGNIFICANT CHANGE UP
BASOPHILS # BLD AUTO: 0.06 K/UL — SIGNIFICANT CHANGE UP (ref 0–0.2)
BASOPHILS NFR BLD AUTO: 0.7 % — SIGNIFICANT CHANGE UP (ref 0–2)
BILIRUB SERPL-MCNC: 0.3 MG/DL — LOW (ref 0.4–2)
BLD GP AB SCN SERPL QL: SIGNIFICANT CHANGE UP
BUN SERPL-MCNC: 12.4 MG/DL — SIGNIFICANT CHANGE UP (ref 8–20)
CALCIUM SERPL-MCNC: 9 MG/DL — SIGNIFICANT CHANGE UP (ref 8.6–10.2)
CHLORIDE SERPL-SCNC: 99 MMOL/L — SIGNIFICANT CHANGE UP (ref 98–107)
CO2 SERPL-SCNC: 26 MMOL/L — SIGNIFICANT CHANGE UP (ref 22–29)
CREAT SERPL-MCNC: 0.54 MG/DL — SIGNIFICANT CHANGE UP (ref 0.5–1.3)
EGFR: 103 ML/MIN/1.73M2 — SIGNIFICANT CHANGE UP
EOSINOPHIL # BLD AUTO: 0.25 K/UL — SIGNIFICANT CHANGE UP (ref 0–0.5)
EOSINOPHIL NFR BLD AUTO: 2.8 % — SIGNIFICANT CHANGE UP (ref 0–6)
GLUCOSE SERPL-MCNC: 174 MG/DL — HIGH (ref 70–99)
HCT VFR BLD CALC: 39.9 % — SIGNIFICANT CHANGE UP (ref 34.5–45)
HGB BLD-MCNC: 12.6 G/DL — SIGNIFICANT CHANGE UP (ref 11.5–15.5)
IMM GRANULOCYTES NFR BLD AUTO: 0.2 % — SIGNIFICANT CHANGE UP (ref 0–1.5)
INR BLD: 1.08 RATIO — SIGNIFICANT CHANGE UP (ref 0.88–1.16)
LYMPHOCYTES # BLD AUTO: 2.86 K/UL — SIGNIFICANT CHANGE UP (ref 1–3.3)
LYMPHOCYTES # BLD AUTO: 32.5 % — SIGNIFICANT CHANGE UP (ref 13–44)
MCHC RBC-ENTMCNC: 25.1 PG — LOW (ref 27–34)
MCHC RBC-ENTMCNC: 31.6 GM/DL — LOW (ref 32–36)
MCV RBC AUTO: 79.5 FL — LOW (ref 80–100)
MONOCYTES # BLD AUTO: 0.66 K/UL — SIGNIFICANT CHANGE UP (ref 0–0.9)
MONOCYTES NFR BLD AUTO: 7.5 % — SIGNIFICANT CHANGE UP (ref 2–14)
NEUTROPHILS # BLD AUTO: 4.95 K/UL — SIGNIFICANT CHANGE UP (ref 1.8–7.4)
NEUTROPHILS NFR BLD AUTO: 56.3 % — SIGNIFICANT CHANGE UP (ref 43–77)
OB PNL STL: POSITIVE
PLATELET # BLD AUTO: 238 K/UL — SIGNIFICANT CHANGE UP (ref 150–400)
POTASSIUM SERPL-MCNC: 4.1 MMOL/L — SIGNIFICANT CHANGE UP (ref 3.5–5.3)
POTASSIUM SERPL-SCNC: 4.1 MMOL/L — SIGNIFICANT CHANGE UP (ref 3.5–5.3)
PROT SERPL-MCNC: 7.9 G/DL — SIGNIFICANT CHANGE UP (ref 6.6–8.7)
PROTHROM AB SERPL-ACNC: 12.5 SEC — SIGNIFICANT CHANGE UP (ref 10.5–13.4)
RBC # BLD: 5.02 M/UL — SIGNIFICANT CHANGE UP (ref 3.8–5.2)
RBC # FLD: 16.4 % — HIGH (ref 10.3–14.5)
SODIUM SERPL-SCNC: 135 MMOL/L — SIGNIFICANT CHANGE UP (ref 135–145)
WBC # BLD: 8.8 K/UL — SIGNIFICANT CHANGE UP (ref 3.8–10.5)
WBC # FLD AUTO: 8.8 K/UL — SIGNIFICANT CHANGE UP (ref 3.8–10.5)

## 2022-05-10 PROCEDURE — 36415 COLL VENOUS BLD VENIPUNCTURE: CPT

## 2022-05-10 PROCEDURE — 86900 BLOOD TYPING SEROLOGIC ABO: CPT

## 2022-05-10 PROCEDURE — 85025 COMPLETE CBC W/AUTO DIFF WBC: CPT

## 2022-05-10 PROCEDURE — 85730 THROMBOPLASTIN TIME PARTIAL: CPT

## 2022-05-10 PROCEDURE — 99285 EMERGENCY DEPT VISIT HI MDM: CPT

## 2022-05-10 PROCEDURE — 99284 EMERGENCY DEPT VISIT MOD MDM: CPT | Mod: 25

## 2022-05-10 PROCEDURE — 85610 PROTHROMBIN TIME: CPT

## 2022-05-10 PROCEDURE — 80053 COMPREHEN METABOLIC PANEL: CPT

## 2022-05-10 PROCEDURE — 86850 RBC ANTIBODY SCREEN: CPT

## 2022-05-10 PROCEDURE — 86901 BLOOD TYPING SEROLOGIC RH(D): CPT

## 2022-05-10 PROCEDURE — 74176 CT ABD & PELVIS W/O CONTRAST: CPT | Mod: 26,MA

## 2022-05-10 PROCEDURE — 74176 CT ABD & PELVIS W/O CONTRAST: CPT | Mod: MA

## 2022-05-10 PROCEDURE — 82272 OCCULT BLD FECES 1-3 TESTS: CPT

## 2022-05-10 NOTE — ED PROVIDER NOTE - PATIENT PORTAL LINK FT
You can access the FollowMyHealth Patient Portal offered by [facility] by registering at the following website: [email or web address]. By joining [employer]’s FollowMyHealth portal, you will also be able to view your health information using other applications (apps) compatible with our system.

## 2022-05-10 NOTE — ED ADULT NURSE NOTE - OBJECTIVE STATEMENT
Pt presents to ED for lower abd pain and bloody stool x 5 days. Pt seen PCP 5/9/22 and referral made for GI.  Son at bedside. Shows picture of bloody streaked tissue.     Pt with hx of stoke with residual deficits.

## 2022-05-10 NOTE — ED PROVIDER NOTE - PHYSICAL EXAMINATION
General: well appearing, NAD  Head: NC, AT  EENT: EOMI, no scleral icterus  Cardiac: RRR, no apparent murmurs, no lower extremity edema  Respiratory: CTABL, no respiratory distress   Abdomen: soft, ND, nonperitonitic, bl lower ab tenderness  MSK/Vascular: full ROM, soft compartments, warm extremities  Neuro: AAOx3, sensation to light touch intact  Psych: calm, cooperative  Rectal exam: No hemorrhoids, no tears/fissures, scan blood on fingertip

## 2022-05-10 NOTE — ED PROVIDER NOTE - CLINICAL SUMMARY MEDICAL DECISION MAKING FREE TEXT BOX
62 y f with stroke, htn, hld, presenting for rectal bleed. Diverticulosis most likely, No fever so diverticulitis unlikely. Labs, CT ab pelv.

## 2022-05-10 NOTE — ED PROVIDER NOTE - NS ED ROS FT
Constitutional: no fever, no chills  Head: NC, AT   Eyes: no redness   ENMT: no nasal congestion/drainage, no sore throat   CV: no chest pain, no edema  Resp: no cough, no dyspnea  GI: +abdominal pain, +rectal bleeding, no nausea, no vomiting, no diarrhea  : no dysuria, no hematuria   Skin: no lesions, no rashes   Neuro: no LOC, no headache, no sensory deficits, no weakness

## 2022-05-10 NOTE — ED ADULT NURSE NOTE - NSIMPLEMENTINTERV_GEN_ALL_ED
Implemented All Fall Risk Interventions:  [place] to call system. Call bell, personal items and telephone within reach. Instruct patient to call for assistance. Room bathroom lighting operational. Non-slip footwear when patient is off stretcher. Physically safe environment: no spills, clutter or unnecessary equipment. Stretcher in lowest position, wheels locked, appropriate side rails in place. Provide visual cue, wrist band, yellow gown, etc. Monitor gait and stability. Monitor for mental status changes and reorient to person, place, and time. Review medications for side effects contributing to fall risk. Reinforce activity limits and safety measures with patient and family.

## 2022-05-10 NOTE — ED PROVIDER NOTE - CARE PROVIDER_API CALL
Fritz Ivory)  Gastroenterology; Internal Medicine  [address] [place] [address]  Phone: [phone]  Fax: [phone]  Follow Up Time:

## 2022-05-10 NOTE — ED PROVIDER NOTE - NSFOLLOWUPINSTRUCTIONS_ED_ALL_ED_FT
Follow up with your GI doctor.    Diverticulosis       Diverticulosis is a condition that develops when small pouches (diverticula) form in the wall of the large intestine (colon). The colon is where water is absorbed and stool (feces) is formed. The pouches form when the inside layer of the colon pushes through weak spots in the outer layers of the colon. You may have a few pouches or many of them.    The pouches usually do not cause problems unless they become inflamed or infected. When this happens, the condition is called diverticulitis.      What are the causes?    The cause of this condition is not known.      What increases the risk?    The following factors may make you more likely to develop this condition:  •Being older than age 60. Your risk for this condition increases with age. Diverticulosis is rare among people younger than age 30. By age 80, many people have it.      •Eating a low-fiber diet.      •Having frequent constipation.      •Being overweight.      •Not getting enough exercise.      •Smoking.      •Taking over-the-counter pain medicines, like aspirin and ibuprofen.      •Having a family history of diverticulosis.        What are the signs or symptoms?    In most people, there are no symptoms of this condition. If you do have symptoms, they may include:  •Bloating.      •Cramps in the abdomen.      •Constipation or diarrhea.      •Pain in the lower left side of the abdomen.        How is this diagnosed?    Because diverticulosis usually has no symptoms, it is most often diagnosed during an exam for other colon problems. The condition may be diagnosed by:  •Using a flexible scope to examine the colon (colonoscopy).      •Taking an X-ray of the colon after dye has been put into the colon (barium enema).      •Having a CT scan.        How is this treated?     You may not need treatment for this condition. Your health care provider may recommend treatment to prevent problems. You may need treatment if you have symptoms or if you previously had diverticulitis. Treatment may include:  •Eating a high-fiber diet.      •Taking a fiber supplement.      •Taking a live bacteria supplement (probiotic).      •Taking medicine to relax your colon.        Follow these instructions at home:    Medicines     •Take over-the-counter and prescription medicines only as told by your health care provider.      •If told by your health care provider, take a fiber supplement or probiotic.        Constipation prevention      Your condition may cause constipation. To prevent or treat constipation, you may need to:  •Drink enough fluid to keep your urine pale yellow.      •Take over-the-counter or prescription medicines.      •Eat foods that are high in fiber, such as beans, whole grains, and fresh fruits and vegetables.      •Limit foods that are high in fat and processed sugars, such as fried or sweet foods.      General instructions     •Try not to strain when you have a bowel movement.      •Keep all follow-up visits as told by your health care provider. This is important.        Contact a health care provider if you:    •Have pain in your abdomen.      •Have bloating.      •Have cramps.      •Have not had a bowel movement in 3 days.        Get help right away if:    •Your pain gets worse.      •Your bloating becomes very bad.      •You have a fever or chills, and your symptoms suddenly get worse.      •You vomit.      •You have bowel movements that are bloody or black.      •You have bleeding from your rectum.        Summary    •Diverticulosis is a condition that develops when small pouches (diverticula) form in the wall of the large intestine (colon).      •You may have a few pouches or many of them.      •This condition is most often diagnosed during an exam for other colon problems.      •Treatment may include increasing the fiber in your diet, taking supplements, or taking medicines.      This information is not intended to replace advice given to you by your health care provider. Make sure you discuss any questions you have with your health care provider. Follow up with your GI doctor.    ????????       ??????????????? ?? ??? ?????? ?? ?? ?? ?????? ???? ?? ?? ???? ??? (??????????) ?? ????? ??? ???? ???? (????????????) ???? ???? ?????????? ?? ??? ?? ???? ???? ??????? ???? ?? ?? ?? (??) ???? ??? ???? ?? ???? ??? ?? ???? ?? ?????? ??? ???? ?? ????? ????? ??? ????? ??????? ?? ?????? ?? ????? ???? ??? ???? ??? ??? ???? ?? ????? ?? ?? ?? ???? ????    ???? ????? ?? ?????? ???? ???? ???? ??? ?? ?? ?? ?? ???? ?? ???????? ? ?? ????? ?? ??? ???? ??, ?? ?????? ?? ??????????????? ??? ???? ???      ???? ???? ????    ?? ?????? ?? ???? ????? ???? ???      ???? ????? ?????? ???    ?????????? ???? ???? ?? ?????? ?? ?????? ???? ?? ???? ??????? ??? ???? ???:  • 60 ???? ?? ???? ??? ?? ????? ?? ?????? ?? ??? ???? ????? ???? ?? ??? ????? ???? ??? 30 ???? ?? ?? ???? ?? ????? ??? ??????????????? ?????? ??? 80 ???? ?? ??? ??, ?? ????? ?? ?? ???? ???      • ?? ????? ???? ???? ?????      • ???-??? ???? ?????      •??? ??????? ?????      •???????? ??????? ???? ?????      •?????????      • ???????? ?? ?????????? ???? ????? ?? ???? ????? ???? ???? ?????? ????? ????.      • ??????????????? ?? ????????? ?????? ?????    ???? ???? ???? ???? ???? ???     ???? ?? ?????? ?? ??? ????? ?? ???????? ???? ?? ???? ??? ???? ????????? ?????? ??????? ???????? ?? ????? ?? ??? ????? ?? ??????? ?? ???? ??? ??? ???? ????? ??? ?? ??? ???? ???? ??????????????? ?? ?? ???? ????? ?? ???????? ?? ???? ??? ????? ??? ????? ?? ???? ???:  • ???? ????? ???? ???? ?????      • ????? ????????? ?????      • ?? ????? ?????? ???? (???????????) ?????      • ???? ?????????? ?? ???? ???? ?? ??? ??? ?????        ?? ?? ?? ????????? ?? ???? ????:    ???????    • ?????? ?? ????? ?? ???? ????? ???? ?? ?????? ?? ????? ?? ????? ???? ??? ??? ?? ???? ????????? ?????? ??????? ?? ???? ????      •??? ???? ????????? ?????? ??????? ?? ??? ??, ?? ????? ????????? ?? ??????????? ????        ???? ?? ??????     ???? ?????? ???? ???? ?? ???? ??? ???? ?? ????? ?? ???? ???? ???? ?? ???, ???? ???????? ?? ???? ??:  • ???? ????? ?? ????? ???? ???? ?? ??? ???????? ?????? ??? ??? ?????? ?????    ????????? ?????? ??????? ?? ?????? ???? ??? ??:    • ???? ??? ??? ???? ???      • ???? ???      • ???? ???      • 3 ????? ?? ?? ????? ???? ???? ???        ????? ?????? ??????? ???? ???:    • ???? ???? ??? ???? ???      • ???? ???? ???? ???? ?? ???? ???      • ???? ????? ?? ??? ???? ??, ?? ???? ????? ????? ???? ?? ???? ????      • ?? ????? ???? ????      • ???? ?? ????? ???? ?? ?? ???? ?? ???? ???? ???      • ???? ????? ?? ??? ?? ??? ???

## 2022-05-10 NOTE — ED PROVIDER NOTE - NSICDXPASTMEDICALHX_GEN_ALL_CORE_FT
PAST MEDICAL HISTORY:  CAD (coronary artery disease)     DM (diabetes mellitus)     Hyperlipidemia     Hypertension     Mini stroke

## 2022-05-10 NOTE — ED PROVIDER NOTE - OBJECTIVE STATEMENT
62 y f with 62 y f with stroke, htn, hld, presenting for rectal bleed. Has had mucus mixed with blood over the last 6 days associated with bl lower ab pain. Only blood thinner is asa. Never happened before. No mariann blood/clots in toilet. Was seen by pcp and given GI referral. Denying f/c, n/v, cp, sob, lightheadedness, syncope. 62 y female with stroke, htn, hld, presenting for rectal bleed. Has had mucus mixed with blood over the last 6 days associated with bl lower ab pain. Only blood thinner is asa. Never happened before. No mariann blood/clots in toilet. Was seen by pcp and given GI referral. Denying f/c, n/v, cp, sob, lightheadedness, syncope.

## 2022-06-19 NOTE — ED ADULT NURSE NOTE - NS ED NURSE IV DC DT
[facility]  Progress Note    Patient: Cristobal Irby Age: 66 y.o. : 1951 MR#: [id number] SSN: [id number]  Date: 2022         Assessment/Plan:     Hospital Problems  Never Reviewed          Codes Class Noted POA    Hypokalemia ICD-10-CM: E87.6  ICD-9-CM: 276.8  6/15/2022 No        Scrotal edema ICD-10-CM: N50.89  ICD-9-CM: 608.86  2022 Unknown        * (Principal) Acute on chronic diastolic heart failure (HCC) ICD-10-CM: I50.33  ICD-9-CM: 428.33  2022 Yes        Bilateral lower extremity pain ICD-10-CM: M79.604, M79.605  ICD-9-CM: 729.5  2022 Unknown        Paroxysmal atrial fibrillation (HCC) ICD-10-CM: I48.0  ICD-9-CM: 427.31  2020 Yes            Acute on chronic HFpEF  - continue with IV diuresis  - monitor BMP - renal function is stable  - strict I&Os, daily weight  - echo noted  - appreciate cardiology assistance    Scrotal edema: 2/2 HF. Pain and swelling improving with diuresis. No s/sx of infection    PAF  -.cont coreg, warfarin  - daily INRs    Hypokalemia  - replace as needed and monitor    Morbid Obesity   Counseled on weight loss      DVT Prophylaxis:  []Lovenox  []Hep SQ  []SCDs  [x]Coumadin   []On Heparin gtt []PO anticoagulant    Anticipated discharge: 1-2 days; PT recs SNF    Subjective:     Pt seen and evaluated, lying in bed, no acute distress.     Objective:   VS:   Visit Vitals  /66 (BP 1 Location: Left upper arm, BP Patient Position: At rest)   Pulse 74   Temp 100.2 °F (37.9 °C)   Resp 18   Ht 6' (1.829 m)   Wt 156.4 kg (344 lb 14.4 oz)   SpO2 96%   BMI 46.78 kg/m²      Tmax/24hrs: Temp (24hrs), Av.6 °F (37 °C), Min:97.6 °F (36.4 °C), Max:100.2 °F (37.9 °C)      Intake/Output Summary (Last 24 hours) at 2022 1446  Last data filed at 2022 1131  Gross per 24 hour   Intake 600 ml   Output 1325 ml   Net -725 ml       General Appearance: NAD, conversant  HENT: normocephalic/atraumatic, moist mucus membranes  Neck: No JVD, supple  Lungs: CTA with normal respiratory effort  CV: RRR, no m/r/g  Abdomen: soft, non-tender, normal bowel sounds  Extremities: no cyanosis, 2+ pitting edema BLE  Neuro: No focal deficits, motor/sensory intact  Skin: Normal color, intact  Psych: appropriate affect    Current Facility-Administered Medications   Medication Dose Route Frequency    warfarin (COUMADIN) tablet 5 mg  5 mg Oral ONCE    WARFARIN INFORMATION NOTE (COUMADIN)   Other Q24H    sodium chloride (NS) flush 5-40 mL  5-40 mL IntraVENous Q8H    sodium chloride (NS) flush 5-40 mL  5-40 mL IntraVENous PRN    acetaminophen (TYLENOL) tablet 650 mg  650 mg Oral Q6H PRN    Or    acetaminophen (TYLENOL) suppository 650 mg  650 mg Rectal Q6H PRN    polyethylene glycol (MIRALAX) packet 17 g  17 g Oral DAILY PRN    ondansetron (ZOFRAN ODT) tablet 4 mg  4 mg Oral Q8H PRN    Or    ondansetron (ZOFRAN) injection 4 mg  4 mg IntraVENous Q6H PRN    furosemide (LASIX) injection 40 mg  40 mg IntraVENous BID    carvediloL (COREG) tablet 6.25 mg  6.25 mg Oral BID    FLUoxetine (PROzac) capsule 20 mg  20 mg Oral DAILY    pantoprazole (PROTONIX) tablet 40 mg  40 mg Oral ACB        Labs:    Recent Results (from the past 24 hour(s))   PROTHROMBIN TIME + INR    Collection Time: 06/19/22  1:35 AM   Result Value Ref Range    Prothrombin time 22.8 (H) 11.5 - 15.2 sec    INR 2.0 (H) 0.8 - 1.2     METABOLIC PANEL, BASIC    Collection Time: 06/19/22  1:35 AM   Result Value Ref Range    Sodium 140 136 - 145 mmol/L    Potassium 3.5 3.5 - 5.5 mmol/L    Chloride 105 100 - 111 mmol/L    CO2 31 21 - 32 mmol/L    Anion gap 4 3.0 - 18 mmol/L    Glucose 90 74 - 99 mg/dL    BUN 14 7.0 - 18 MG/DL    Creatinine 0.81 0.6 - 1.3 MG/DL    BUN/Creatinine ratio 17 12 - 20      GFR est AA >60 >60 ml/min/1.73m2    GFR est non-AA >60 >60 ml/min/1.73m2    Calcium 9.0 8.5 - 10.1 MG/DL       Imaging:  No results found.        Elina Zarco MD    June 19, 2022 04-Jan-2019 22:38

## 2022-06-25 ENCOUNTER — APPOINTMENT (OUTPATIENT)
Dept: ORTHOPEDIC SURGERY | Facility: CLINIC | Age: 62
End: 2022-06-25

## 2022-06-25 ENCOUNTER — APPOINTMENT (OUTPATIENT)
Dept: ORTHOPEDIC SURGERY | Facility: CLINIC | Age: 62
End: 2022-06-25
Payer: MEDICAID

## 2022-06-25 DIAGNOSIS — M25.572 PAIN IN LEFT ANKLE AND JOINTS OF LEFT FOOT: ICD-10-CM

## 2022-06-25 DIAGNOSIS — M79.672 PAIN IN LEFT FOOT: ICD-10-CM

## 2022-06-25 DIAGNOSIS — M76.822 POSTERIOR TIBIAL TENDINITIS, LEFT LEG: ICD-10-CM

## 2022-06-25 DIAGNOSIS — M21.612 BUNION OF LEFT FOOT: ICD-10-CM

## 2022-06-25 PROCEDURE — 99214 OFFICE O/P EST MOD 30 MIN: CPT

## 2022-06-25 PROCEDURE — 73630 X-RAY EXAM OF FOOT: CPT | Mod: LT

## 2022-06-25 PROCEDURE — 73600 X-RAY EXAM OF ANKLE: CPT | Mod: LT

## 2022-06-25 RX ORDER — MELOXICAM 15 MG/1
15 TABLET ORAL DAILY
Qty: 30 | Refills: 0 | Status: ACTIVE | COMMUNITY
Start: 2022-06-25 | End: 1900-01-01

## 2022-06-25 NOTE — DISCUSSION/SUMMARY
[de-identified] : At this time I want a go ahead and get an MRI of the left ankle without contrast to evaluate for stress fracture in the midfoot and to evaluate the posterior tibialis tendon for tear.  I gave the patient an ASO brace to support the medial side of the ankle and foot.  The patient will continue with anti-inflammatory creams and gels and I prescribed her extra strength Tylenol to take.  The patient will continue to use topical NSAIDs.  Once the MRI is completed I will call the patient and her son to review.  All of her and her son's questions were answered they both understood the treatment course.

## 2022-06-25 NOTE — HISTORY OF PRESENT ILLNESS
[FreeTextEntry1] : The patient is a 62-year-old female who presents with her son today in the office for evaluation of her left ankle and foot pain.  She started to have severe sharp stabbing pains approximately 2 weeks ago on the medial side of her ankle and foot.  She states that the pain scale today is an 8 out of 10.  She presents walking with a limp.  She has slip on shoes.  She has no radiating pain going up and down the leg.  The pain is gotten worse over the past 2 weeks.  The son states that she has been using anti-inflammatory patches and gels and orals without pain relief.  No other complaints.

## 2022-06-25 NOTE — PHYSICAL EXAM
[de-identified] : Left ankle Physical Examination:\par \par General: Alert and oriented x3.  In no acute distress.  Pleasant in nature with a normal affect.  No apparent respiratory distress. \par Erythema, Warmth, Rubor: Negative\par Swelling: Negative\par \par ROM:\par 1. Dorsiflexion: 10 degrees\par 2. Plantarflexion: 40 degrees\par 3. Inversion: 10 degrees\par 4. Eversion: 10 degrees\par \par Tenderness to Palpation: \par 1. Lateral Malleolus: Negative\par 2. Medial Malleolus: Negative\par 3. Proximal Fibular Pain: Negative\par 4. Heel Pain: Negative\par 5. Cuboid: Negative\par 6. Navicular: Severe tenderness to palpation.\par 7. Tibiotalar Joint: Negative\par 8. Subtalar Joint: Negative\par 9. Posterior Recess: Negative\par \par Tendon Pain:\par 1. Achilles: Negative\par 2. Peroneals: Negative\par 3. Posterior Tibialis: Severe tenderness to palpation.\par 4. Tibialis Anterior: Negative\par \par Ligament Pain:\par 1. ATFL: Positive\par 2. CFL: Negative \par 3. PTFL: Negative\par 4. Deltoid Ligaments: Negative\par 5. Lis Franc Ligament: Negative\par \par Stability: \par 1. Anterior Drawer: Negative\par 2. Posterior Drawer: Negative\par \par Strength: 5/5 TA/GS/EHL\par \par Pulses: 2+ DP/PT Pulses\par \par Neuro: Intact motor and sensory\par \par Additional Test:\par 1. Calcaneal Squeeze Test: Negative\par 2. Syndesmosis Squeeze Test: Negative\par \par \par Left foot Physical Examination:\par \par General: Alert and oriented x3.  In no acute distress.  Pleasant in nature with a normal affect.  No apparent respiratory distress. \par Erythema, Warmth, Rubor: Negative\par Swelling: Positive\par \par ROM of digits: Normal\par \par Pes Planus: When the patient weightbears, her arch collapses.\par Pes Cavus: Negative\par \par Bunion: Positive\par Mayelin's Bunion (Bunionette): Negative\par Hammer Toe Deformity/Deformities: Negative\par \par Tenderness to Palpation: \par 1. Heel Pain: Negative\par 2. Midfoot Pain: Positive\par 3. First MTP Joint: Negative\par 4. Lis Franc Joint: Negative\par \par Tenderness Metatarsals:\par 1st MT: Negative\par 2nd MT: Negative\par 3rd MT: Negative\par 4th MT: Negative\par 5th MT: Negative\par Base of the 5th MT: Negative\par \par Ligament Pain:\par 1. Lis Franc Ligament: Negative\par 2. Plantar Fascia Ligament: Negative\par \par Strength: \par 5/5 TA/GS/EHL/FHL/EDL/ADD/ABD\par \par Pulses: 2+ DP/PT Pulses\par \par Capillary Refill Toes: <2 seconds\par \par Neuro: Intact motor and sensory throughout\par \par Additional Test:\par 1. Velásquez's Squeeze Test: Negative\par 2. Calcaneal Squeeze Test: Negative [de-identified] : 3 views x-rays left ankle reviewed, 6/25/2022: heel spurs noted.  Deformity noted of the distal fibula ?  old fracture which caused deformity versus congenital deformity.  No other abnormality seen in the ankle.\par \par Left foot x-rays reviewed, 6/24/2022: Arthritic changes noted in the midfoot.  No fracture seen.\par \par *Calcifications noted in the vessels of the foot and ankle.

## 2022-06-29 PROBLEM — M25.572 LEFT ANKLE PAIN: Status: ACTIVE | Noted: 2022-06-25

## 2022-07-07 ENCOUNTER — OUTPATIENT (OUTPATIENT)
Dept: OUTPATIENT SERVICES | Facility: HOSPITAL | Age: 62
LOS: 1 days | End: 2022-07-07

## 2022-07-07 ENCOUNTER — APPOINTMENT (OUTPATIENT)
Dept: MRI IMAGING | Facility: CLINIC | Age: 62
End: 2022-07-07

## 2022-07-07 DIAGNOSIS — M25.572 PAIN IN LEFT ANKLE AND JOINTS OF LEFT FOOT: ICD-10-CM

## 2022-07-07 DIAGNOSIS — Z98.890 OTHER SPECIFIED POSTPROCEDURAL STATES: Chronic | ICD-10-CM

## 2022-07-07 PROCEDURE — 73721 MRI JNT OF LWR EXTRE W/O DYE: CPT | Mod: 26,LT

## 2022-09-17 ENCOUNTER — EMERGENCY (EMERGENCY)
Facility: HOSPITAL | Age: 62
LOS: 1 days | Discharge: HOME | End: 2022-09-17
Attending: EMERGENCY MEDICINE
Payer: COMMERCIAL

## 2022-09-17 VITALS
TEMPERATURE: 98 F | SYSTOLIC BLOOD PRESSURE: 134 MMHG | HEART RATE: 80 BPM | HEIGHT: 63 IN | WEIGHT: 154.76 LBS | DIASTOLIC BLOOD PRESSURE: 66 MMHG | OXYGEN SATURATION: 98 % | RESPIRATION RATE: 20 BRPM

## 2022-09-17 VITALS
DIASTOLIC BLOOD PRESSURE: 81 MMHG | RESPIRATION RATE: 18 BRPM | OXYGEN SATURATION: 95 % | SYSTOLIC BLOOD PRESSURE: 148 MMHG | HEART RATE: 66 BPM

## 2022-09-17 DIAGNOSIS — Z98.890 OTHER SPECIFIED POSTPROCEDURAL STATES: Chronic | ICD-10-CM

## 2022-09-17 LAB
ALBUMIN SERPL ELPH-MCNC: 4.1 G/DL — SIGNIFICANT CHANGE UP (ref 3.3–5.2)
ALP SERPL-CCNC: 150 U/L — HIGH (ref 40–120)
ALT FLD-CCNC: 27 U/L — SIGNIFICANT CHANGE UP
ANION GAP SERPL CALC-SCNC: 13 MMOL/L — SIGNIFICANT CHANGE UP (ref 5–17)
APPEARANCE UR: CLEAR — SIGNIFICANT CHANGE UP
AST SERPL-CCNC: 30 U/L — SIGNIFICANT CHANGE UP
B PERT DNA SPEC QL NAA+PROBE: SIGNIFICANT CHANGE UP
BACTERIA # UR AUTO: ABNORMAL
BASOPHILS # BLD AUTO: 0.07 K/UL — SIGNIFICANT CHANGE UP (ref 0–0.2)
BASOPHILS NFR BLD AUTO: 0.8 % — SIGNIFICANT CHANGE UP (ref 0–2)
BILIRUB SERPL-MCNC: 0.4 MG/DL — SIGNIFICANT CHANGE UP (ref 0.4–2)
BILIRUB UR-MCNC: NEGATIVE — SIGNIFICANT CHANGE UP
BUN SERPL-MCNC: 10.7 MG/DL — SIGNIFICANT CHANGE UP (ref 8–20)
C PNEUM DNA SPEC QL NAA+PROBE: SIGNIFICANT CHANGE UP
CALCIUM SERPL-MCNC: 9.6 MG/DL — SIGNIFICANT CHANGE UP (ref 8.4–10.5)
CHLORIDE SERPL-SCNC: 102 MMOL/L — SIGNIFICANT CHANGE UP (ref 98–107)
CO2 SERPL-SCNC: 22 MMOL/L — SIGNIFICANT CHANGE UP (ref 22–29)
COLOR SPEC: YELLOW — SIGNIFICANT CHANGE UP
CREAT SERPL-MCNC: 0.56 MG/DL — SIGNIFICANT CHANGE UP (ref 0.5–1.3)
DIFF PNL FLD: NEGATIVE — SIGNIFICANT CHANGE UP
EGFR: 102 ML/MIN/1.73M2 — SIGNIFICANT CHANGE UP
EOSINOPHIL # BLD AUTO: 0.21 K/UL — SIGNIFICANT CHANGE UP (ref 0–0.5)
EOSINOPHIL NFR BLD AUTO: 2.5 % — SIGNIFICANT CHANGE UP (ref 0–6)
EPI CELLS # UR: SIGNIFICANT CHANGE UP
FLUAV H1 2009 PAND RNA SPEC QL NAA+PROBE: SIGNIFICANT CHANGE UP
FLUAV H1 RNA SPEC QL NAA+PROBE: SIGNIFICANT CHANGE UP
FLUAV H3 RNA SPEC QL NAA+PROBE: SIGNIFICANT CHANGE UP
FLUAV SUBTYP SPEC NAA+PROBE: SIGNIFICANT CHANGE UP
FLUBV RNA SPEC QL NAA+PROBE: SIGNIFICANT CHANGE UP
GLUCOSE SERPL-MCNC: 136 MG/DL — HIGH (ref 70–99)
GLUCOSE UR QL: NEGATIVE MG/DL — SIGNIFICANT CHANGE UP
HADV DNA SPEC QL NAA+PROBE: SIGNIFICANT CHANGE UP
HCOV PNL SPEC NAA+PROBE: SIGNIFICANT CHANGE UP
HCT VFR BLD CALC: 42.2 % — SIGNIFICANT CHANGE UP (ref 34.5–45)
HGB BLD-MCNC: 13.4 G/DL — SIGNIFICANT CHANGE UP (ref 11.5–15.5)
HMPV RNA SPEC QL NAA+PROBE: SIGNIFICANT CHANGE UP
HPIV1 RNA SPEC QL NAA+PROBE: SIGNIFICANT CHANGE UP
HPIV2 RNA SPEC QL NAA+PROBE: SIGNIFICANT CHANGE UP
HPIV3 RNA SPEC QL NAA+PROBE: SIGNIFICANT CHANGE UP
HPIV4 RNA SPEC QL NAA+PROBE: SIGNIFICANT CHANGE UP
IMM GRANULOCYTES NFR BLD AUTO: 0.2 % — SIGNIFICANT CHANGE UP (ref 0–0.9)
KETONES UR-MCNC: NEGATIVE — SIGNIFICANT CHANGE UP
LACTATE BLDV-MCNC: 1.1 MMOL/L — SIGNIFICANT CHANGE UP (ref 0.5–2)
LEUKOCYTE ESTERASE UR-ACNC: ABNORMAL
LYMPHOCYTES # BLD AUTO: 2.45 K/UL — SIGNIFICANT CHANGE UP (ref 1–3.3)
LYMPHOCYTES # BLD AUTO: 28.9 % — SIGNIFICANT CHANGE UP (ref 13–44)
MCHC RBC-ENTMCNC: 25.6 PG — LOW (ref 27–34)
MCHC RBC-ENTMCNC: 31.8 GM/DL — LOW (ref 32–36)
MCV RBC AUTO: 80.7 FL — SIGNIFICANT CHANGE UP (ref 80–100)
MONOCYTES # BLD AUTO: 0.63 K/UL — SIGNIFICANT CHANGE UP (ref 0–0.9)
MONOCYTES NFR BLD AUTO: 7.4 % — SIGNIFICANT CHANGE UP (ref 2–14)
NEUTROPHILS # BLD AUTO: 5.1 K/UL — SIGNIFICANT CHANGE UP (ref 1.8–7.4)
NEUTROPHILS NFR BLD AUTO: 60.2 % — SIGNIFICANT CHANGE UP (ref 43–77)
NITRITE UR-MCNC: NEGATIVE — SIGNIFICANT CHANGE UP
PH UR: 6.5 — SIGNIFICANT CHANGE UP (ref 5–8)
PLATELET # BLD AUTO: 230 K/UL — SIGNIFICANT CHANGE UP (ref 150–400)
POTASSIUM SERPL-MCNC: 4.8 MMOL/L — SIGNIFICANT CHANGE UP (ref 3.5–5.3)
POTASSIUM SERPL-SCNC: 4.8 MMOL/L — SIGNIFICANT CHANGE UP (ref 3.5–5.3)
PROT SERPL-MCNC: 8 G/DL — SIGNIFICANT CHANGE UP (ref 6.6–8.7)
PROT UR-MCNC: NEGATIVE — SIGNIFICANT CHANGE UP
RAPID RVP RESULT: DETECTED
RBC # BLD: 5.23 M/UL — HIGH (ref 3.8–5.2)
RBC # FLD: 16.1 % — HIGH (ref 10.3–14.5)
RBC CASTS # UR COMP ASSIST: SIGNIFICANT CHANGE UP /HPF (ref 0–4)
RV+EV RNA SPEC QL NAA+PROBE: DETECTED
SARS-COV-2 RNA SPEC QL NAA+PROBE: DETECTED
SODIUM SERPL-SCNC: 137 MMOL/L — SIGNIFICANT CHANGE UP (ref 135–145)
SP GR SPEC: 1.01 — SIGNIFICANT CHANGE UP (ref 1.01–1.02)
UROBILINOGEN FLD QL: NEGATIVE MG/DL — SIGNIFICANT CHANGE UP
WBC # BLD: 8.48 K/UL — SIGNIFICANT CHANGE UP (ref 3.8–10.5)
WBC # FLD AUTO: 8.48 K/UL — SIGNIFICANT CHANGE UP (ref 3.8–10.5)
WBC UR QL: SIGNIFICANT CHANGE UP /HPF (ref 0–5)

## 2022-09-17 PROCEDURE — 0225U NFCT DS DNA&RNA 21 SARSCOV2: CPT

## 2022-09-17 PROCEDURE — 96374 THER/PROPH/DIAG INJ IV PUSH: CPT

## 2022-09-17 PROCEDURE — 81001 URINALYSIS AUTO W/SCOPE: CPT

## 2022-09-17 PROCEDURE — 99284 EMERGENCY DEPT VISIT MOD MDM: CPT

## 2022-09-17 PROCEDURE — 99245 OFF/OP CONSLTJ NEW/EST HI 55: CPT

## 2022-09-17 PROCEDURE — 36415 COLL VENOUS BLD VENIPUNCTURE: CPT

## 2022-09-17 PROCEDURE — 83605 ASSAY OF LACTIC ACID: CPT

## 2022-09-17 PROCEDURE — 87086 URINE CULTURE/COLONY COUNT: CPT

## 2022-09-17 PROCEDURE — 99284 EMERGENCY DEPT VISIT MOD MDM: CPT | Mod: 25

## 2022-09-17 PROCEDURE — 80053 COMPREHEN METABOLIC PANEL: CPT

## 2022-09-17 PROCEDURE — 85025 COMPLETE CBC W/AUTO DIFF WBC: CPT

## 2022-09-17 RX ORDER — PIPERACILLIN AND TAZOBACTAM 4; .5 G/20ML; G/20ML
3.38 INJECTION, POWDER, LYOPHILIZED, FOR SOLUTION INTRAVENOUS ONCE
Refills: 0 | Status: COMPLETED | OUTPATIENT
Start: 2022-09-17 | End: 2022-09-17

## 2022-09-17 RX ADMIN — PIPERACILLIN AND TAZOBACTAM 200 GRAM(S): 4; .5 INJECTION, POWDER, LYOPHILIZED, FOR SOLUTION INTRAVENOUS at 10:47

## 2022-09-17 NOTE — CONSULT NOTE ADULT - SUBJECTIVE AND OBJECTIVE BOX
[facility]  INFECTIOUS DISEASES at [place] and [place]  =====================================================                               Genaro Mo MD*    Aida Vuong MD*                             Dali Tejeda MD*     Whitney Aguirre MD*            Diplomates American Board of Internal Medicine & Infectious Diseases                * [place] Office - Appt - Tel  [phone] Fax [phone]                * [place] Office - Appt - Tel [phone] Fax [phone]                                  Hospital Consult line:  [phone]  =====================================================      [id number]  LUIS WORKMAN        CC: Patient is a 62y old  Female who presents with a chief complaint of urinary incontinence    HPI: 61 y/o female with pmhx of HTN, DM, Parkinson's, and high cholesterol, sent for IV antibiotics by her PCP after UCX grew Morganella morganii resistant to all oral options. Per patient and son, she has experienced urinary incontinence (leakage) for 2-3 moths. They vehemently deny pain or burning with urination, flank pain, suprapubic pain, fever, chills. Reportedly, her PCP prescribed an oral antibiotic on Mon 9/2 (name unknown) that did not improve her symptoms; Ultimately UCx grew Morganella morganii (culture results but per ED notes only susceptible to carbapenems and pip-tazo)    In the ED, patient is in stable clinical conditions. No fever recorded. Labs notable for normal WBC with normal differential, renal functions stable, UA without pyuria, only trace LE. UCx drawn. Received one  dose of piperacillin-tazobactam      ______________________________________________________  PAST MEDICAL & SURGICAL HISTORY:  DM (diabetes mellitus)      CAD (coronary artery disease)      Hypertension      Hyperlipidemia      Mini stroke      History of hysterectomy          Social History:  no alcohol, tobacco or drugs    FAMILY HISTORY:  No pertinent family history in first degree relatives    ______________________________________________________  Allergies    No Known Allergies    Intolerances        ______________________________________________________  MEDICATIONS:  Antibiotics:    Other medications:    ______________________________________________________  REVIEW OF SYSTEMS:  CONSTITUTIONAL:  No fever or chills  HEENT:  No diplopia or blurred vision.  No earache, sore throat or runny nose.  CARDIOVASCULAR:  No chest pain  RESPIRATORY:  No cough, shortness of breath  GASTROINTESTINAL:  No nausea, vomiting, abdominal pain or diarrhea.  GENITOURINARY:  as per HPI   MUSCULOSKELETAL:  no joint aches, no muscle pain  SKIN:  No change in skin, hair or nails.  NEUROLOGIC:  No headaches, seizures  PSYCHIATRIC:  No disorder of thought or mood.  ENDOCRINE:  No heat or cold intolerance  HEMATOLOGICAL:  No easy bruising or bleeding.     _____________________________________________________  PHYSICAL EXAM:  GEN: awake, alert, oriented x 3. Lying comfortable in bed, in no acute distress   HEENT: normocephalic and atraumatic. EOMI. PERRL.  Anicteric sclerae. Moist mucous membranes. No mucosal lesions. No nasal discharge.   NECK: Supple. No palpable neck masses or LN  LUNGS: eupneic, CTA B/L, no adventitious sounds  HEART: RRR, no m/r/g  ABDOMEN: Soft, NT, ND, no hepatosplenomegally, no palpable masses.  +BS.    : No CVA tenderness, no Haas catheter  EXTREMITIES: well perfused, without  edema.  MSK: No joint deformity or swelling  LYMPH: no palpable cervical, supraclavicular, axillary or inguinal lymph nodes  NEUROLOGIC: +tremors  PSYCHIATRIC: Appropriate affect and mood.  SKIN: No rash, wounds or jaundice  LINES: no central lines, only peripheral access c/d/i    ______________________________________________________  Height (cm): 160 (09-17 @ 09:23)  Weight (kg): 70.2 (09-17 @ 09:23)  BMI (kg/m2): 27.4 (09-17 @ 09:23)  BSA (m2): 1.73 (09-17 @ 09:23)    Vitals:    T(F): 98 (17 Sep 2022 09:23), Max: 98 (17 Sep 2022 09:23)  HR: 66 (17 Sep 2022 13:17)  BP: 148/81 (17 Sep 2022 13:17)  RR: 18 (17 Sep 2022 13:17)  SpO2: 95% (17 Sep 2022 13:17) (95% - 98%)  temp max in last 48H T(F): , Max: 98 (09-17-22 @ 09:23)    Current Antibiotics:    Other medications:                            13.4   8.48  )-----------( 230      ( 17 Sep 2022 10:43 )             42.2     09-17    137  |  102  |  10.7  ----------------------------<  136<H>  4.8   |  22.0  |  0.56    Ca    9.6      17 Sep 2022 10:43    TPro  8.0  /  Alb  4.1  /  TBili  0.4  /  DBili  x   /  AST  30  /  ALT  27  /  AlkPhos  150<H>  09-17    RECENT CULTURES:      WBC Count: 8.48 K/uL (09-17-22 @ 10:43)    Creatinine, Serum: 0.56 mg/dL (09-17-22 @ 10:43)    UA: no pyuria, trace LE         ______________________________________________________  RADIOLOGY  No recent imaging

## 2022-09-17 NOTE — ED STATDOCS - OBJECTIVE STATEMENT
61 y/o female with pmhx of HTN, DM, Parkinson's, and high cholesterol, c/o dysuria and urinary incontinence x2 months with generalized weakness and tried.   Pt was sent to ED from PMD, dx with UTI and sent for IV antibiotics. Denies f/c/n/v/cp/sob/palpitations/ cough/rash/headache/dizziness/abd.pain/d/c/hematuria. No surgical hx or allergies. Pt was given oral antibiotic can't recall which and didn't help. 61 y/o female with pmhx of HTN, DM, Parkinson's, and high cholesterol, c/o dysuria and urinary incontinence x2 months with now generalized weakness and feeling tired  Pt was sent to ED from PMD, dx with UTI and sent for IV antibiotics. Denies f/c/n/v/cp/sob/palpitations/ cough/rash/headache/dizziness/abd.pain/d/c/hematuria. No surgical hx or allergies. Pt was given oral antibiotic can't recall which and didn't help.

## 2022-09-17 NOTE — ED ADULT NURSE NOTE - NSICDXPASTSURGICALHX_GEN_ALL_CORE_FT
Patient in room PCU 3019. I have received report from Manju and had the opportunity to ask 
questions and assume patient care. PAST SURGICAL HISTORY:  History of hysterectomy

## 2022-09-17 NOTE — ED STATDOCS - CLINICAL SUMMARY MEDICAL DECISION MAKING FREE TEXT BOX
Pt sent in by PMD for admission for IV antibiotics as urine + Morganella Morganii infection resistant to oral antibiotics.  Pt is sensitive to zosyn test performed on 8/24. Pt sent in by PMD for admission for IV antibiotics as urine + Morganella Morganii infection resistant to oral antibiotics.  Pt is sensitive to Ertapenem, Gentamicin, Meropenem, Piperacillin/Tazobactam test performed on 8/24.

## 2022-09-17 NOTE — ED ADULT TRIAGE NOTE - CHIEF COMPLAINT QUOTE
Pt was sent from primary for urinary incontinence . As per note pt very resistant to Morganella Morganii - needs IV antibiotics

## 2022-09-17 NOTE — ED STATDOCS - ATTENDING APP SHARED VISIT CONTRIBUTION OF CARE
I, Argelia Cedillo, performed the initial face to face bedside interview with this patient regarding history of present illness, review of symptoms and relevant past medical, social and family history.  I completed an independent physical examination.  I was the initial provider who evaluated this patient. I have signed out the follow up of any pending tests (i.e. labs, radiological studies) to the ACP.  I have communicated the patient’s plan of care and disposition with the ACP.

## 2022-09-17 NOTE — ED STATDOCS - PATIENT PORTAL LINK FT
You can access the FollowMyHealth Patient Portal offered by [facility] by registering at the following website: [email or web address]. By joining [facility]’s FollowMyHealth portal, you will also be able to view your health information using other applications (apps) compatible with our system.

## 2022-09-17 NOTE — ED STATDOCS - PROGRESS NOTE DETAILS
PT evaluated by intake physician. HPI/ROS/PE as noted above. Will follow up plan per intake physician Labs unremarkable. UA showed trace LE. Pt was seen by ID (Dr. Quinton Aguirre) who doesn't think patient needs antibiotics/admission and UTI less likely but that patient should follow up with urology for her incontinence. All results discussed with son at bedside (Mr. Galarza [phone]) and instructed to f/u with urology clinic in 1-2 days. Strict ED return precautions given.

## 2022-09-17 NOTE — CONSULT NOTE ADULT - ASSESSMENT
61 y/o female with pmhx of HTN, DM, Parkinson's, and high cholesterol presents with long standing urinary incontinence (>2 months), but NO dysuria, flank pain or suprapubic pain. Sent by PCP for IV antibiotics after outpatient UCX grew resistant Morganella morganii (cultures unavailable). Her labs are essentially normal, demonstrating a normal WBC and unremarkable UA (no pyuria, trace LE).       Impression:  Asymptomatic bacteriuria  Urinary incontinence  Colonized by MDR GNR    Plan:  - Would not treat UCx unless patient exhibits symptoms of an active infection   - Discussed at length with son that asymptomatic bacteriuria does not require treatment with antibiotics, and that her long standing symptoms of urinary incontinence are unlikely to resolve with antibiotics.   - She will likely need to be seen by urology for further management.   - D/w ED team   - Will sign off. Please call with questions.     Thank you for allowing us to participate in the care of your patient.     Whitney Aguirre MD

## 2022-09-17 NOTE — ED STATDOCS - NS ED ATTENDING STATEMENT MOD
This was a shared visit with the MAXINE. I reviewed and verified the documentation and independently performed the documented:

## 2022-09-17 NOTE — ED STATDOCS - NSFOLLOWUPINSTRUCTIONS_ED_ALL_ED_FT
1)Please follow up with urology clinic in 1-2 days  2) Follow up with your PCP in 1-2 days  3)Return to the emergency room if you are experiencing any severe back or abdominal pain, fever, inability to keep fluids or medicine down, dizziness/lightheadedness, or a change in mental status.

## 2022-09-17 NOTE — ED ADULT NURSE NOTE - OBJECTIVE STATEMENT
presents from home with "urinary infection" patient states she went to her primary on monday and was on PO abx with no relief.  patient was advised to come to ed.  denies any pain, n/v.  denies any trouble urinating. patient with hx parkinson's.  baseline RUE tremor noted.  NAD.

## 2022-09-17 NOTE — ED STATDOCS - CARE PROVIDER_API CALL
Eleazar Farooq)  Urology  [address] [place] [address]  Phone: [phone]  Fax: [phone]  Follow Up Time: 1-3 Days

## 2022-09-19 LAB
CULTURE RESULTS: SIGNIFICANT CHANGE UP
SPECIMEN SOURCE: SIGNIFICANT CHANGE UP

## 2022-11-30 NOTE — ED PROVIDER NOTE - NS HIV RISK FACTOR YES
Declined Ivermectin Counseling:  Patient instructed to take medication on an empty stomach with a full glass of water.  Patient informed of potential adverse effects including but not limited to nausea, diarrhea, dizziness, itching, and swelling of the extremities or lymph nodes.  The patient verbalized understanding of the proper use and possible adverse effects of ivermectin.  All of the patient's questions and concerns were addressed.

## 2023-03-23 NOTE — ED PROVIDER NOTE - CARE PLAN
Oriented - self; Oriented - place; Oriented - time 1 Principal Discharge DX:	Colon, diverticulosis

## 2023-04-04 ENCOUNTER — OFFICE (OUTPATIENT)
Dept: URBAN - METROPOLITAN AREA CLINIC 112 | Facility: CLINIC | Age: 63
Setting detail: OPHTHALMOLOGY
End: 2023-04-04
Payer: COMMERCIAL

## 2023-04-04 DIAGNOSIS — H25.13: ICD-10-CM

## 2023-04-04 DIAGNOSIS — H01.012: ICD-10-CM

## 2023-04-04 DIAGNOSIS — H35.033: ICD-10-CM

## 2023-04-04 DIAGNOSIS — H40.013: ICD-10-CM

## 2023-04-04 DIAGNOSIS — H04.123: ICD-10-CM

## 2023-04-04 DIAGNOSIS — H16.223: ICD-10-CM

## 2023-04-04 DIAGNOSIS — H01.015: ICD-10-CM

## 2023-04-04 DIAGNOSIS — E11.9: ICD-10-CM

## 2023-04-04 PROCEDURE — 92250 FUNDUS PHOTOGRAPHY W/I&R: CPT | Performed by: OPHTHALMOLOGY

## 2023-04-04 PROCEDURE — 92004 COMPRE OPH EXAM NEW PT 1/>: CPT | Performed by: OPHTHALMOLOGY

## 2023-04-04 ASSESSMENT — TONOMETRY
OS_IOP_MMHG: 18
OD_IOP_MMHG: 18

## 2023-04-04 ASSESSMENT — REFRACTION_MANIFEST
OS_AXIS: 089
OD_CYLINDER: -0.50
OD_SPHERE: +2.25
OS_VA1: 20/60
OD_AXIS: 088
OS_SPHERE: +2.25
OS_CYLINDER: -0.50
OS_ADD: +2.50
OD_VA2: 20/25
OU_VA: 20/50
OS_VA2: 20/25
OD_VA1: 20/50
OD_ADD: +2.25

## 2023-04-04 ASSESSMENT — KERATOMETRY
OD_AXISANGLE_DEGREES: 016
OS_AXISANGLE_DEGREES: 090
OS_K1POWER_DIOPTERS: 42.50
OS_K2POWER_DIOPTERS: 42.50
OD_K2POWER_DIOPTERS: 42.50
OD_K1POWER_DIOPTERS: 42.00

## 2023-04-04 ASSESSMENT — SPHEQUIV_DERIVED
OS_SPHEQUIV: 0.875
OD_SPHEQUIV: 2
OD_SPHEQUIV: 1.25
OS_SPHEQUIV: 2

## 2023-04-04 ASSESSMENT — SUPERFICIAL PUNCTATE KERATITIS (SPK)
OS_SPK: T 1+
OD_SPK: T 1+

## 2023-04-04 ASSESSMENT — REFRACTION_CURRENTRX
OD_SPHERE: +4.00
OS_VPRISM_DIRECTION: SV
OD_CYLINDER: 0.00
OS_OVR_VA: 20/
OS_SPHERE: +4.50
OS_AXIS: 090
OS_CYLINDER: -0.50
OD_AXIS: 000
OD_OVR_VA: 20/
OD_VPRISM_DIRECTION: SV

## 2023-04-04 ASSESSMENT — CONFRONTATIONAL VISUAL FIELD TEST (CVF)
OS_FINDINGS: FULL
OD_FINDINGS: FULL

## 2023-04-04 ASSESSMENT — AXIALLENGTH_DERIVED
OD_AL: 23.2761
OS_AL: 23.1871
OD_AL: 23.5641
OS_AL: 23.6183

## 2023-04-04 ASSESSMENT — LID EXAM ASSESSMENTS
OD_BLEPHARITIS: RLL 1+
OS_BLEPHARITIS: LLL 1+

## 2023-04-04 ASSESSMENT — REFRACTION_AUTOREFRACTION
OD_AXIS: 086
OD_CYLINDER: -0.50
OS_AXIS: 113
OS_CYLINDER: -0.75
OS_SPHERE: +1.25
OD_SPHERE: +1.50

## 2023-04-04 ASSESSMENT — VISUAL ACUITY
OD_BCVA: 20/70
OS_BCVA: 20/70

## 2023-06-09 ENCOUNTER — OFFICE (OUTPATIENT)
Dept: URBAN - METROPOLITAN AREA CLINIC 94 | Facility: CLINIC | Age: 63
Setting detail: OPHTHALMOLOGY
End: 2023-06-09
Payer: MEDICARE

## 2023-06-09 DIAGNOSIS — H16.223: ICD-10-CM

## 2023-06-09 DIAGNOSIS — H01.015: ICD-10-CM

## 2023-06-09 DIAGNOSIS — H40.013: ICD-10-CM

## 2023-06-09 DIAGNOSIS — H01.012: ICD-10-CM

## 2023-06-09 DIAGNOSIS — H25.13: ICD-10-CM

## 2023-06-09 DIAGNOSIS — E11.9: ICD-10-CM

## 2023-06-09 DIAGNOSIS — H35.033: ICD-10-CM

## 2023-06-09 DIAGNOSIS — H25.11: ICD-10-CM

## 2023-06-09 PROBLEM — H25.12 CATARACT SENILE NUCLEAR SCLEROSIS; RIGHT EYE, LEFT EYE, BOTH EYES: Status: ACTIVE | Noted: 2023-06-09

## 2023-06-09 PROCEDURE — 92136 OPHTHALMIC BIOMETRY: CPT | Performed by: OPHTHALMOLOGY

## 2023-06-09 PROCEDURE — 92133 CPTRZD OPH DX IMG PST SGM ON: CPT | Performed by: OPHTHALMOLOGY

## 2023-06-09 PROCEDURE — 99213 OFFICE O/P EST LOW 20 MIN: CPT | Performed by: OPHTHALMOLOGY

## 2023-06-09 ASSESSMENT — KERATOMETRY
OS_K2POWER_DIOPTERS: 42.75
OS_AXISANGLE2_DEGREES: 104
OD_K1POWER_DIOPTERS: 41.75
OD_AXISANGLE2_DEGREES: 023
OD_K1K2_AVERAGE: 42
OD_K2POWER_DIOPTERS: 42.25
OD_AXISANGLE_DEGREES: 113
OS_CYLAXISANGLE_DEGREES: 104
OS_K1POWER_DIOPTERS: 42.50
OD_CYLPOWER_DEGREES: 0.5
OS_K1K2_AVERAGE: 42.625
OS_AXISANGLE_DEGREES: 14
OS_AXISANGLE_DEGREES: 104
OS_K1POWER_DIOPTERS: 42.50
OS_K2POWER_DIOPTERS: 42.75
OD_CYLAXISANGLE_DEGREES: 023
OD_AXISANGLE_DEGREES: 023
OS_CYLPOWER_DEGREES: 0.25
OD_K2POWER_DIOPTERS: 42.25
OD_K1POWER_DIOPTERS: 41.75

## 2023-06-09 ASSESSMENT — REFRACTION_CURRENTRX
OD_VPRISM_DIRECTION: SV
OD_SPHERE: +4.00
OD_OVR_VA: 20/
OD_AXIS: 000
OS_OVR_VA: 20/
OD_CYLINDER: 0.00
OS_VPRISM_DIRECTION: SV
OS_AXIS: 090
OS_SPHERE: +4.50
OS_CYLINDER: -0.50

## 2023-06-09 ASSESSMENT — TONOMETRY
OS_IOP_MMHG: 16
OD_IOP_MMHG: 16

## 2023-06-09 ASSESSMENT — SPHEQUIV_DERIVED
OS_SPHEQUIV: 2
OD_SPHEQUIV: 2
OS_SPHEQUIV: 0.5
OD_SPHEQUIV: 0.75

## 2023-06-09 ASSESSMENT — REFRACTION_MANIFEST
OS_SPHERE: +2.25
OD_AXIS: 088
OD_ADD: +2.25
OD_CYLINDER: -0.50
OS_ADD: +2.50
OD_SPHERE: +2.25
OS_AXIS: 089
OS_VA2: 20/25
OS_CYLINDER: -0.50
OS_VA1: 20/60
OD_VA2: 20/25
OU_VA: 20/50
OD_VA1: 20/50

## 2023-06-09 ASSESSMENT — REFRACTION_AUTOREFRACTION
OD_CYLINDER: -0.50
OD_SPHERE: +1.00
OD_AXIS: 093
OS_SPHERE: +0.75
OS_CYLINDER: -0.50
OS_AXIS: 112

## 2023-06-09 ASSESSMENT — LID EXAM ASSESSMENTS
OS_BLEPHARITIS: LLL 1+
OD_BLEPHARITIS: RLL 1+

## 2023-06-09 ASSESSMENT — CONFRONTATIONAL VISUAL FIELD TEST (CVF)
OS_FINDINGS: FULL
OD_FINDINGS: FULL

## 2023-06-09 ASSESSMENT — AXIALLENGTH_DERIVED
OD_AL: 23.8535
OD_AL: 23.3658
OS_AL: 23.7192
OS_AL: 23.1428

## 2023-06-09 ASSESSMENT — VISUAL ACUITY
OD_BCVA: 20/60
OS_BCVA: 20/80+1

## 2023-06-09 ASSESSMENT — SUPERFICIAL PUNCTATE KERATITIS (SPK)
OS_SPK: T 1+
OD_SPK: T 1+

## 2023-08-03 ENCOUNTER — ASC (OUTPATIENT)
Dept: URBAN - METROPOLITAN AREA SURGERY 8 | Facility: SURGERY | Age: 63
Setting detail: OPHTHALMOLOGY
End: 2023-08-03
Payer: MEDICARE

## 2023-08-03 DIAGNOSIS — H25.11: ICD-10-CM

## 2023-08-03 PROCEDURE — 66984 XCAPSL CTRC RMVL W/O ECP: CPT | Performed by: OPHTHALMOLOGY

## 2023-08-03 NOTE — ED ADULT TRIAGE NOTE - BSA (M2)
Head, normocephalic, atraumatic, Face, Face within normal limits, Ears, External ears within normal limits, Nose/Nasopharynx, External nose normal appearance, nares patent, no nasal discharge, Mouth and Throat, Oral cavity appearance normal, Lips, Appearance normal 1.78

## 2023-08-04 ENCOUNTER — OFFICE (OUTPATIENT)
Dept: URBAN - METROPOLITAN AREA CLINIC 94 | Facility: CLINIC | Age: 63
Setting detail: OPHTHALMOLOGY
End: 2023-08-04
Payer: MEDICARE

## 2023-08-04 ENCOUNTER — RX ONLY (RX ONLY)
Age: 63
End: 2023-08-04

## 2023-08-04 DIAGNOSIS — Z96.1: ICD-10-CM

## 2023-08-04 PROCEDURE — 99024 POSTOP FOLLOW-UP VISIT: CPT | Performed by: REGISTERED NURSE

## 2023-08-04 ASSESSMENT — AXIALLENGTH_DERIVED
OS_AL: 23.7685
OD_AL: 24.1677
OD_AL: 23.1871
OS_AL: 23.1428

## 2023-08-04 ASSESSMENT — REFRACTION_CURRENTRX
OS_VPRISM_DIRECTION: SV
OD_SPHERE: +4.00
OD_OVR_VA: 20/
OD_CYLINDER: 0.00
OS_CYLINDER: -0.50
OS_SPHERE: +4.50
OS_AXIS: 090
OD_AXIS: 000
OS_OVR_VA: 20/
OD_VPRISM_DIRECTION: SV

## 2023-08-04 ASSESSMENT — TONOMETRY
OS_IOP_MMHG: 14
OD_IOP_MMHG: 17

## 2023-08-04 ASSESSMENT — REFRACTION_MANIFEST
OS_SPHERE: +2.25
OD_VA2: 20/25
OU_VA: 20/50
OD_SPHERE: +2.25
OD_VA1: 20/50
OS_ADD: +2.50
OS_CYLINDER: -0.50
OS_VA1: 20/60
OD_CYLINDER: -0.50
OD_AXIS: 088
OS_AXIS: 089
OD_ADD: +2.25
OS_VA2: 20/25

## 2023-08-04 ASSESSMENT — KERATOMETRY
OD_AXISANGLE_DEGREES: 130
OS_K2POWER_DIOPTERS: 42.75
OS_AXISANGLE_DEGREES: 081
OD_K2POWER_DIOPTERS: 42.75
OS_K1POWER_DIOPTERS: 42.50
OD_K1POWER_DIOPTERS: 42.25

## 2023-08-04 ASSESSMENT — CORNEAL EDEMA - FOLDS/STRIAE: OD_FOLDSSTRIAE: 2+

## 2023-08-04 ASSESSMENT — REFRACTION_AUTOREFRACTION
OD_SPHERE: 0.00
OS_AXIS: 121
OD_AXIS: 080
OD_CYLINDER: -1.00
OS_SPHERE: +0.75
OS_CYLINDER: -0.75

## 2023-08-04 ASSESSMENT — CONFRONTATIONAL VISUAL FIELD TEST (CVF)
OS_FINDINGS: FULL
OD_FINDINGS: FULL

## 2023-08-04 ASSESSMENT — VISUAL ACUITY
OD_BCVA: 20/70
OS_BCVA: 20/50

## 2023-08-04 ASSESSMENT — LID EXAM ASSESSMENTS
OS_BLEPHARITIS: LLL 1+
OD_BLEPHARITIS: RLL 1+

## 2023-08-04 ASSESSMENT — SPHEQUIV_DERIVED
OS_SPHEQUIV: 0.375
OS_SPHEQUIV: 2
OD_SPHEQUIV: 2
OD_SPHEQUIV: -0.5

## 2023-08-04 ASSESSMENT — SUPERFICIAL PUNCTATE KERATITIS (SPK)
OS_SPK: T 1+
OD_SPK: T 1+

## 2023-08-11 ENCOUNTER — OFFICE (OUTPATIENT)
Dept: URBAN - METROPOLITAN AREA CLINIC 94 | Facility: CLINIC | Age: 63
Setting detail: OPHTHALMOLOGY
End: 2023-08-11
Payer: MEDICARE

## 2023-08-11 DIAGNOSIS — Z96.1: ICD-10-CM

## 2023-08-11 DIAGNOSIS — H25.12: ICD-10-CM

## 2023-08-11 PROCEDURE — 92136 OPHTHALMIC BIOMETRY: CPT | Performed by: OPHTHALMOLOGY

## 2023-08-11 PROCEDURE — 99024 POSTOP FOLLOW-UP VISIT: CPT | Performed by: OPHTHALMOLOGY

## 2023-08-11 ASSESSMENT — AXIALLENGTH_DERIVED
OD_AL: 24.1051
OS_AL: 23.2315
OS_AL: 23.7135
OD_AL: 24.0544
OD_AL: 23.3658

## 2023-08-11 ASSESSMENT — SUPERFICIAL PUNCTATE KERATITIS (SPK)
OD_SPK: T 1+
OS_SPK: T 1+

## 2023-08-11 ASSESSMENT — REFRACTION_CURRENTRX
OD_OVR_VA: 20/
OD_AXIS: 000
OD_SPHERE: +4.00
OS_AXIS: 090
OS_VPRISM_DIRECTION: SV
OS_CYLINDER: -0.50
OD_VPRISM_DIRECTION: SV
OS_SPHERE: +4.50
OD_CYLINDER: 0.00
OS_OVR_VA: 20/

## 2023-08-11 ASSESSMENT — CONFRONTATIONAL VISUAL FIELD TEST (CVF)
OD_FINDINGS: FULL
OS_FINDINGS: FULL

## 2023-08-11 ASSESSMENT — TONOMETRY
OS_IOP_MMHG: 16
OD_IOP_MMHG: 15

## 2023-08-11 ASSESSMENT — VISUAL ACUITY
OS_BCVA: 20/30
OD_BCVA: 20/60

## 2023-08-11 ASSESSMENT — REFRACTION_MANIFEST
OS_ADD: +2.50
OD_SPHERE: +2.25
OD_VA1: 20/50
OU_VA: 20/50
OD_ADD: +2.25
OD_VA2: 20/25
OS_VA2: 20/25
OS_CYLINDER: -0.50
OD_CYLINDER: -1.00
OD_AXIS: 108
OD_AXIS: 088
OS_SPHERE: +2.25
OS_AXIS: 089
OD_SPHERE: +0.75
OS_VA1: 20/60
OD_CYLINDER: -0.50

## 2023-08-11 ASSESSMENT — SPHEQUIV_DERIVED
OD_SPHEQUIV: 2
OS_SPHEQUIV: 0.75
OD_SPHEQUIV: 0.125
OD_SPHEQUIV: 0.25
OS_SPHEQUIV: 2

## 2023-08-11 ASSESSMENT — KERATOMETRY
OD_AXISANGLE_DEGREES: 030
OS_K2POWER_DIOPTERS: 42.50
OS_K1POWER_DIOPTERS: 42.25
OD_K1POWER_DIOPTERS: 41.75
OS_AXISANGLE_DEGREES: 008
OD_K2POWER_DIOPTERS: 42.25

## 2023-08-11 ASSESSMENT — LID EXAM ASSESSMENTS
OS_BLEPHARITIS: LLL 1+
OD_BLEPHARITIS: RLL 1+

## 2023-08-11 ASSESSMENT — REFRACTION_AUTOREFRACTION
OS_SPHERE: +1.25
OS_CYLINDER: -1.00
OD_AXIS: 108
OD_CYLINDER: -1.25
OD_SPHERE: +0.75
OS_AXIS: 114

## 2023-08-11 ASSESSMENT — CORNEAL EDEMA - FOLDS/STRIAE: OD_FOLDSSTRIAE: T

## 2023-08-17 ENCOUNTER — ASC (OUTPATIENT)
Dept: URBAN - METROPOLITAN AREA SURGERY 8 | Facility: SURGERY | Age: 63
Setting detail: OPHTHALMOLOGY
End: 2023-08-17
Payer: MEDICARE

## 2023-08-17 DIAGNOSIS — H25.12: ICD-10-CM

## 2023-08-17 PROCEDURE — 66984 XCAPSL CTRC RMVL W/O ECP: CPT | Performed by: OPHTHALMOLOGY

## 2023-08-18 ENCOUNTER — RX ONLY (RX ONLY)
Age: 63
End: 2023-08-18

## 2023-08-18 ENCOUNTER — OFFICE (OUTPATIENT)
Dept: URBAN - METROPOLITAN AREA CLINIC 94 | Facility: CLINIC | Age: 63
Setting detail: OPHTHALMOLOGY
End: 2023-08-18
Payer: MEDICARE

## 2023-08-18 DIAGNOSIS — Z96.1: ICD-10-CM

## 2023-08-18 PROBLEM — H25.12 CATARACT SENILE NUCLEAR SCLEROSIS; LEFT EYE,: Status: RESOLVED | Noted: 2023-08-04 | Resolved: 2023-08-18

## 2023-08-18 PROCEDURE — 99024 POSTOP FOLLOW-UP VISIT: CPT | Performed by: PHYSICIAN ASSISTANT

## 2023-08-18 ASSESSMENT — KERATOMETRY
OS_AXISANGLE_DEGREES: 060
OS_K2POWER_DIOPTERS: 42.75
OD_K1POWER_DIOPTERS: 41.75
OD_K2POWER_DIOPTERS: 42.25
OD_AXISANGLE_DEGREES: 028
OS_K1POWER_DIOPTERS: 42.50

## 2023-08-18 ASSESSMENT — REFRACTION_CURRENTRX
OD_VPRISM_DIRECTION: SV
OS_OVR_VA: 20/
OS_SPHERE: +4.50
OD_CYLINDER: 0.00
OS_CYLINDER: -0.50
OD_SPHERE: +4.00
OD_AXIS: 000
OD_OVR_VA: 20/
OS_VPRISM_DIRECTION: SV
OS_AXIS: 090

## 2023-08-18 ASSESSMENT — REFRACTION_MANIFEST
OD_SPHERE: +2.25
OS_CYLINDER: -0.50
OU_VA: 20/50
OD_CYLINDER: -1.00
OS_ADD: +2.50
OS_VA2: 20/25
OD_VA1: 20/50
OS_AXIS: 089
OD_VA2: 20/25
OD_CYLINDER: -0.50
OD_SPHERE: +0.75
OD_AXIS: 108
OD_ADD: +2.25
OD_AXIS: 088
OS_SPHERE: +2.25
OS_VA1: 20/60

## 2023-08-18 ASSESSMENT — VISUAL ACUITY
OS_BCVA: 20/25
OD_BCVA: 20/50

## 2023-08-18 ASSESSMENT — AXIALLENGTH_DERIVED
OD_AL: 24.0038
OD_AL: 23.3658
OS_AL: 23.818
OD_AL: 24.0544
OS_AL: 23.1428

## 2023-08-18 ASSESSMENT — SPHEQUIV_DERIVED
OS_SPHEQUIV: 0.25
OD_SPHEQUIV: 0.375
OD_SPHEQUIV: 0.25
OD_SPHEQUIV: 2
OS_SPHEQUIV: 2

## 2023-08-18 ASSESSMENT — REFRACTION_AUTOREFRACTION
OS_CYLINDER: -0.50
OS_AXIS: 108
OD_AXIS: 106
OS_SPHERE: +0.50
OD_CYLINDER: -1.25
OD_SPHERE: +1.00

## 2023-08-18 ASSESSMENT — SUPERFICIAL PUNCTATE KERATITIS (SPK)
OD_SPK: T 1+
OS_SPK: T 1+

## 2023-08-18 ASSESSMENT — CONFRONTATIONAL VISUAL FIELD TEST (CVF)
OS_FINDINGS: FULL
OD_FINDINGS: FULL

## 2023-08-18 ASSESSMENT — TONOMETRY
OS_IOP_MMHG: 17
OD_IOP_MMHG: 15

## 2023-08-18 ASSESSMENT — CORNEAL EDEMA - FOLDS/STRIAE: OD_FOLDSSTRIAE: T

## 2023-08-18 ASSESSMENT — LID EXAM ASSESSMENTS
OD_BLEPHARITIS: RLL 1+
OS_BLEPHARITIS: LLL 1+

## 2023-08-23 ENCOUNTER — OFFICE (OUTPATIENT)
Dept: URBAN - METROPOLITAN AREA CLINIC 94 | Facility: CLINIC | Age: 63
Setting detail: OPHTHALMOLOGY
End: 2023-08-23
Payer: MEDICARE

## 2023-08-23 DIAGNOSIS — H40.013: ICD-10-CM

## 2023-08-23 DIAGNOSIS — Z96.1: ICD-10-CM

## 2023-08-23 PROCEDURE — 99024 POSTOP FOLLOW-UP VISIT: CPT | Performed by: PHYSICIAN ASSISTANT

## 2023-08-23 ASSESSMENT — REFRACTION_AUTOREFRACTION
OS_SPHERE: +0.25
OS_CYLINDER: -0.75
OD_SPHERE: +0.75
OD_CYLINDER: -0.50
OS_AXIS: 095
OD_AXIS: 106

## 2023-08-23 ASSESSMENT — REFRACTION_CURRENTRX
OD_VPRISM_DIRECTION: SV
OS_AXIS: 090
OS_CYLINDER: -0.50
OD_AXIS: 000
OS_VPRISM_DIRECTION: SV
OD_OVR_VA: 20/
OS_SPHERE: +4.50
OD_CYLINDER: 0.00
OS_OVR_VA: 20/
OD_SPHERE: +4.00

## 2023-08-23 ASSESSMENT — AXIALLENGTH_DERIVED
OS_AL: 23.1428
OD_AL: 24.0009
OD_AL: 23.411
OS_AL: 23.9679
OD_AL: 24.1022

## 2023-08-23 ASSESSMENT — KERATOMETRY
OD_AXISANGLE_DEGREES: 029
OS_AXISANGLE_DEGREES: 076
OD_K2POWER_DIOPTERS: 42.25
OD_K1POWER_DIOPTERS: 41.50
OS_K1POWER_DIOPTERS: 42.50
OS_K2POWER_DIOPTERS: 42.75

## 2023-08-23 ASSESSMENT — SPHEQUIV_DERIVED
OS_SPHEQUIV: 2
OD_SPHEQUIV: 0.25
OS_SPHEQUIV: -0.125
OD_SPHEQUIV: 0.5
OD_SPHEQUIV: 2

## 2023-08-23 ASSESSMENT — REFRACTION_MANIFEST
OS_CYLINDER: -0.50
OS_SPHERE: +2.25
OS_ADD: +2.50
OS_AXIS: 089
OD_CYLINDER: -1.00
OU_VA: 20/50
OD_AXIS: 108
OD_SPHERE: +0.75
OS_VA2: 20/25
OS_VA1: 20/60
OD_ADD: +2.25
OD_SPHERE: +2.25
OD_CYLINDER: -0.50
OD_AXIS: 088
OD_VA1: 20/50
OD_VA2: 20/25

## 2023-08-23 ASSESSMENT — LID EXAM ASSESSMENTS
OS_BLEPHARITIS: LLL 1+
OD_BLEPHARITIS: RLL 1+

## 2023-08-23 ASSESSMENT — SUPERFICIAL PUNCTATE KERATITIS (SPK)
OS_SPK: T 1+
OD_SPK: T 1+

## 2023-08-23 ASSESSMENT — CONFRONTATIONAL VISUAL FIELD TEST (CVF)
OD_FINDINGS: FULL
OS_FINDINGS: FULL

## 2023-08-23 ASSESSMENT — TONOMETRY
OD_IOP_MMHG: 15
OS_IOP_MMHG: 13

## 2023-08-23 ASSESSMENT — VISUAL ACUITY
OD_BCVA: 20/30
OS_BCVA: 20/25-1

## 2023-09-06 NOTE — ED ADULT TRIAGE NOTE - ARRIVAL FROM
Health Maintenance Due   Topic Date Due   • Hepatitis B Vaccine (1 of 3 - 3-dose series) Never done   • Varicella Vaccine (1 of 2 - 2-dose childhood series) Never done   • COVID-19 Vaccine (3 - Pfizer series) 09/17/2021   • Influenza Vaccine (1) 09/01/2023       Patient is due for topics as listed above but is not proceeding with Immunization(s) COVID-19, Hep B, Influenza and Varicella at this time. Varicella and Hep B due to clinical protocols, flu shot due to lack of inventory, and covid-19 to be discussed with MD.     Home

## 2023-09-21 ENCOUNTER — OFFICE (OUTPATIENT)
Dept: URBAN - METROPOLITAN AREA CLINIC 94 | Facility: CLINIC | Age: 63
Setting detail: OPHTHALMOLOGY
End: 2023-09-21
Payer: MEDICARE

## 2023-09-21 DIAGNOSIS — Z96.1: ICD-10-CM

## 2023-09-21 PROCEDURE — 99024 POSTOP FOLLOW-UP VISIT: CPT | Performed by: OPTOMETRIST

## 2023-09-21 ASSESSMENT — REFRACTION_MANIFEST
OS_CYLINDER: -0.50
OS_ADD: +2.50
OD_CYLINDER: -0.50
OD_CYLINDER: -0.50
OD_ADD: +2.25
OS_CYLINDER: -0.50
OD_AXIS: 105
OS_VA1: 20/60
OD_SPHERE: +0.75
OD_VA1: 20/20
OS_AXIS: 105
OD_VA1: 20/50
OS_VA2: 20/25
OS_SPHERE: +2.25
OS_VA1: 20/20
OD_ADD: +2.25
OD_SPHERE: +2.25
OS_SPHERE: +0.50
OD_CYLINDER: -1.00
OD_AXIS: 108
OD_SPHERE: +0.50
OU_VA: 20/50
OS_ADD: +2.25
OD_VA2: 20/25
OS_AXIS: 089
OD_AXIS: 088

## 2023-09-21 ASSESSMENT — KERATOMETRY
OD_K1POWER_DIOPTERS: 41.50
OS_K2POWER_DIOPTERS: 42.75
OS_AXISANGLE_DEGREES: 076
OS_K1POWER_DIOPTERS: 42.50
OD_AXISANGLE_DEGREES: 029
OD_K2POWER_DIOPTERS: 42.25

## 2023-09-21 ASSESSMENT — REFRACTION_CURRENTRX
OD_OVR_VA: 20/
OS_VPRISM_DIRECTION: SV
OS_SPHERE: +4.50
OD_AXIS: 000
OD_SPHERE: +4.00
OS_AXIS: 090
OD_VPRISM_DIRECTION: SV
OD_CYLINDER: 0.00
OS_OVR_VA: 20/
OS_CYLINDER: -0.50

## 2023-09-21 ASSESSMENT — SPHEQUIV_DERIVED
OD_SPHEQUIV: 2
OS_SPHEQUIV: 2
OD_SPHEQUIV: 0.25
OS_SPHEQUIV: 0.25
OS_SPHEQUIV: -0.125
OD_SPHEQUIV: 0.5
OD_SPHEQUIV: 0.25

## 2023-09-21 ASSESSMENT — AXIALLENGTH_DERIVED
OD_AL: 24.0009
OD_AL: 23.411
OS_AL: 23.818
OS_AL: 23.9679
OD_AL: 24.1022
OD_AL: 24.1022
OS_AL: 23.1428

## 2023-09-21 ASSESSMENT — VISUAL ACUITY
OD_BCVA: 20/30
OS_BCVA: 20/25

## 2023-09-21 ASSESSMENT — TONOMETRY
OD_IOP_MMHG: 12
OS_IOP_MMHG: 15

## 2023-09-21 ASSESSMENT — LID EXAM ASSESSMENTS
OD_BLEPHARITIS: RLL 1+
OS_BLEPHARITIS: LLL 1+

## 2023-09-21 ASSESSMENT — REFRACTION_AUTOREFRACTION
OS_SPHERE: +0.25
OD_AXIS: 106
OS_CYLINDER: -0.75
OD_CYLINDER: -0.50
OD_SPHERE: +0.75
OS_AXIS: 095

## 2023-09-21 ASSESSMENT — SUPERFICIAL PUNCTATE KERATITIS (SPK)
OS_SPK: T 1+
OD_SPK: T 1+

## 2023-09-21 ASSESSMENT — CONFRONTATIONAL VISUAL FIELD TEST (CVF)
OD_FINDINGS: FULL
OS_FINDINGS: FULL

## 2023-11-28 NOTE — DISCHARGE NOTE ADULT - NS AS DC STROKE DX
Patient verbalized understanding.  
Patient wants to know if she can go back on Xarelto. She is having blood spots come up on her skin and she did not have that problem when she was on Xarelto. Please advise.  
Ischemic Stroke...

## 2023-12-12 NOTE — ED ADULT NURSE NOTE - NS ED NURSE LEVEL OF CONSCIOUSNESS AFFECT
Calm Detail Level: Detailed Number Of Curettages: 3 Size Of Lesion In Cm: 1 Size Of Lesion After Curettage: 1.4 Add Intralesional Injection: No Anesthesia Type: 1% lidocaine with epinephrine Cautery Type: electrodesiccation What Was Performed First?: Curettage Final Size Statement: The size of the lesion after curettage was Additional Information: (Optional): The wound was cleaned, and a pressure dressing was applied.  The patient received detailed post-op instructions. Consent was obtained from the patient. The risks, benefits and alternatives to therapy were discussed in detail. Specifically, the risks of infection, scarring, bleeding, prolonged wound healing, nerve injury, incomplete removal, allergy to anesthesia and recurrence were addressed. Alternatives to ED&C, such as: surgical removal and XRT were also discussed.  Prior to the procedure, the treatment site was clearly identified and confirmed by the patient. All components of Universal Protocol/PAUSE Rule completed. Post-Care Instructions: I reviewed with the patient in detail post-care instructions. Patient is to keep the area dry for 48 hours, and not to engage in any swimming until the area is healed. Should the patient develop any fevers, chills, bleeding, severe pain patient will contact the office immediately. Bill As A Line Item Or As Units: Line Item

## 2024-02-23 ENCOUNTER — OFFICE (OUTPATIENT)
Dept: URBAN - METROPOLITAN AREA CLINIC 94 | Facility: CLINIC | Age: 64
Setting detail: OPHTHALMOLOGY
End: 2024-02-23
Payer: MEDICARE

## 2024-02-23 DIAGNOSIS — H01.015: ICD-10-CM

## 2024-02-23 DIAGNOSIS — H26.493: ICD-10-CM

## 2024-02-23 DIAGNOSIS — E11.9: ICD-10-CM

## 2024-02-23 DIAGNOSIS — H16.223: ICD-10-CM

## 2024-02-23 DIAGNOSIS — H01.012: ICD-10-CM

## 2024-02-23 DIAGNOSIS — H40.013: ICD-10-CM

## 2024-02-23 DIAGNOSIS — H35.033: ICD-10-CM

## 2024-02-23 PROCEDURE — 92250 FUNDUS PHOTOGRAPHY W/I&R: CPT | Performed by: OPHTHALMOLOGY

## 2024-02-23 PROCEDURE — 99214 OFFICE O/P EST MOD 30 MIN: CPT | Performed by: OPHTHALMOLOGY

## 2024-02-23 ASSESSMENT — REFRACTION_CURRENTRX
OD_OVR_VA: 20/
OD_AXIS: 000
OS_SPHERE: +4.50
OD_CYLINDER: 0.00
OD_SPHERE: +4.00
OS_AXIS: 090
OS_OVR_VA: 20/
OS_VPRISM_DIRECTION: SV
OS_CYLINDER: -0.50
OD_VPRISM_DIRECTION: SV

## 2024-02-23 ASSESSMENT — REFRACTION_MANIFEST
OD_SPHERE: +0.50
OS_ADD: +2.50
OD_VA1: 20/20
OD_VA1: 20/50
OD_SPHERE: +2.25
OS_ADD: +2.25
OD_VA2: 20/25
OS_AXIS: 105
OS_CYLINDER: -0.50
OD_SPHERE: +0.75
OD_CYLINDER: -0.50
OS_VA1: 20/60
OD_ADD: +2.25
OD_AXIS: 088
OS_VA1: 20/20
OD_CYLINDER: -0.50
OS_SPHERE: +2.25
OS_AXIS: 089
OD_AXIS: 105
OS_CYLINDER: -0.50
OD_ADD: +2.25
OU_VA: 20/50
OS_SPHERE: +0.50
OD_CYLINDER: -1.00
OS_VA2: 20/25
OD_AXIS: 108

## 2024-02-23 ASSESSMENT — CONFRONTATIONAL VISUAL FIELD TEST (CVF)
OD_FINDINGS: FULL
OS_FINDINGS: FULL

## 2024-02-23 ASSESSMENT — REFRACTION_AUTOREFRACTION
OS_CYLINDER: -1.00
OD_SPHERE: +1.75
OS_AXIS: 094
OD_CYLINDER: -1.50
OD_AXIS: 102
OS_SPHERE: +0.50

## 2024-02-23 ASSESSMENT — SPHEQUIV_DERIVED
OS_SPHEQUIV: 0
OD_SPHEQUIV: 0.25
OS_SPHEQUIV: 0.25
OD_SPHEQUIV: 1
OD_SPHEQUIV: 2
OS_SPHEQUIV: 2
OD_SPHEQUIV: 0.25

## 2024-02-23 ASSESSMENT — SUPERFICIAL PUNCTATE KERATITIS (SPK)
OS_SPK: T 1+
OD_SPK: T 1+

## 2024-02-23 ASSESSMENT — LID EXAM ASSESSMENTS
OS_BLEPHARITIS: LLL 1+
OD_BLEPHARITIS: RLL 1+
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
## 2024-03-29 ENCOUNTER — EMERGENCY (EMERGENCY)
Facility: HOSPITAL | Age: 64
LOS: 1 days | Discharge: HOME | End: 2024-03-29
Attending: EMERGENCY MEDICINE
Payer: MEDICARE

## 2024-03-29 VITALS
TEMPERATURE: 98 F | DIASTOLIC BLOOD PRESSURE: 80 MMHG | SYSTOLIC BLOOD PRESSURE: 142 MMHG | RESPIRATION RATE: 17 BRPM | HEART RATE: 66 BPM | OXYGEN SATURATION: 98 %

## 2024-03-29 VITALS
HEART RATE: 85 BPM | WEIGHT: 134.7 LBS | OXYGEN SATURATION: 100 % | TEMPERATURE: 98 F | DIASTOLIC BLOOD PRESSURE: 71 MMHG | SYSTOLIC BLOOD PRESSURE: 156 MMHG | RESPIRATION RATE: 16 BRPM

## 2024-03-29 DIAGNOSIS — Z98.890 OTHER SPECIFIED POSTPROCEDURAL STATES: Chronic | ICD-10-CM

## 2024-03-29 LAB
ALBUMIN SERPL ELPH-MCNC: 3.6 G/DL — SIGNIFICANT CHANGE UP (ref 3.3–5.2)
ALP SERPL-CCNC: 102 U/L — SIGNIFICANT CHANGE UP (ref 40–120)
ALT FLD-CCNC: 15 U/L — SIGNIFICANT CHANGE UP
ANION GAP SERPL CALC-SCNC: 9 MMOL/L — SIGNIFICANT CHANGE UP (ref 5–17)
APPEARANCE UR: ABNORMAL
AST SERPL-CCNC: 16 U/L — SIGNIFICANT CHANGE UP
BACTERIA # UR AUTO: NEGATIVE /HPF — SIGNIFICANT CHANGE UP
BASOPHILS # BLD AUTO: 0.06 K/UL — SIGNIFICANT CHANGE UP (ref 0–0.2)
BASOPHILS NFR BLD AUTO: 0.5 % — SIGNIFICANT CHANGE UP (ref 0–2)
BILIRUB SERPL-MCNC: <0.2 MG/DL — LOW (ref 0.4–2)
BILIRUB UR-MCNC: NEGATIVE — SIGNIFICANT CHANGE UP
BUN SERPL-MCNC: 10.1 MG/DL — SIGNIFICANT CHANGE UP (ref 8–20)
CALCIUM SERPL-MCNC: 9.4 MG/DL — SIGNIFICANT CHANGE UP (ref 8.4–10.5)
CAST: 0 /LPF — SIGNIFICANT CHANGE UP (ref 0–4)
CHLORIDE SERPL-SCNC: 105 MMOL/L — SIGNIFICANT CHANGE UP (ref 96–108)
CO2 SERPL-SCNC: 27 MMOL/L — SIGNIFICANT CHANGE UP (ref 22–29)
COLOR SPEC: SIGNIFICANT CHANGE UP
CREAT SERPL-MCNC: 0.49 MG/DL — LOW (ref 0.5–1.3)
DIFF PNL FLD: NEGATIVE — SIGNIFICANT CHANGE UP
EGFR: 105 ML/MIN/1.73M2 — SIGNIFICANT CHANGE UP
EOSINOPHIL # BLD AUTO: 0.46 K/UL — SIGNIFICANT CHANGE UP (ref 0–0.5)
EOSINOPHIL NFR BLD AUTO: 3.5 % — SIGNIFICANT CHANGE UP (ref 0–6)
GLUCOSE SERPL-MCNC: 158 MG/DL — HIGH (ref 70–99)
GLUCOSE UR QL: NEGATIVE MG/DL — SIGNIFICANT CHANGE UP
HCT VFR BLD CALC: 37.1 % — SIGNIFICANT CHANGE UP (ref 34.5–45)
HGB BLD-MCNC: 12 G/DL — SIGNIFICANT CHANGE UP (ref 11.5–15.5)
IMM GRANULOCYTES NFR BLD AUTO: 0.4 % — SIGNIFICANT CHANGE UP (ref 0–0.9)
KETONES UR-MCNC: ABNORMAL MG/DL
LEUKOCYTE ESTERASE UR-ACNC: NEGATIVE — SIGNIFICANT CHANGE UP
LIDOCAIN IGE QN: 53 U/L — HIGH (ref 22–51)
LYMPHOCYTES # BLD AUTO: 19.5 % — SIGNIFICANT CHANGE UP (ref 13–44)
LYMPHOCYTES # BLD AUTO: 2.58 K/UL — SIGNIFICANT CHANGE UP (ref 1–3.3)
MCHC RBC-ENTMCNC: 26 PG — LOW (ref 27–34)
MCHC RBC-ENTMCNC: 32.3 GM/DL — SIGNIFICANT CHANGE UP (ref 32–36)
MCV RBC AUTO: 80.3 FL — SIGNIFICANT CHANGE UP (ref 80–100)
MONOCYTES # BLD AUTO: 0.67 K/UL — SIGNIFICANT CHANGE UP (ref 0–0.9)
MONOCYTES NFR BLD AUTO: 5.1 % — SIGNIFICANT CHANGE UP (ref 2–14)
NEUTROPHILS # BLD AUTO: 9.43 K/UL — HIGH (ref 1.8–7.4)
NEUTROPHILS NFR BLD AUTO: 71 % — SIGNIFICANT CHANGE UP (ref 43–77)
NITRITE UR-MCNC: NEGATIVE — SIGNIFICANT CHANGE UP
PH UR: 6.5 — SIGNIFICANT CHANGE UP (ref 5–8)
PLATELET # BLD AUTO: 254 K/UL — SIGNIFICANT CHANGE UP (ref 150–400)
POTASSIUM SERPL-MCNC: 4.1 MMOL/L — SIGNIFICANT CHANGE UP (ref 3.5–5.3)
POTASSIUM SERPL-SCNC: 4.1 MMOL/L — SIGNIFICANT CHANGE UP (ref 3.5–5.3)
PROT SERPL-MCNC: 7.5 G/DL — SIGNIFICANT CHANGE UP (ref 6.6–8.7)
PROT UR-MCNC: NEGATIVE MG/DL — SIGNIFICANT CHANGE UP
RBC # BLD: 4.62 M/UL — SIGNIFICANT CHANGE UP (ref 3.8–5.2)
RBC # FLD: 15.5 % — HIGH (ref 10.3–14.5)
RBC CASTS # UR COMP ASSIST: 0 /HPF — SIGNIFICANT CHANGE UP (ref 0–4)
SODIUM SERPL-SCNC: 141 MMOL/L — SIGNIFICANT CHANGE UP (ref 135–145)
SP GR SPEC: 1.02 — SIGNIFICANT CHANGE UP (ref 1–1.03)
SQUAMOUS # UR AUTO: 2 /HPF — SIGNIFICANT CHANGE UP (ref 0–5)
UROBILINOGEN FLD QL: 0.2 MG/DL — SIGNIFICANT CHANGE UP (ref 0.2–1)
WBC # BLD: 13.25 K/UL — HIGH (ref 3.8–10.5)
WBC # FLD AUTO: 13.25 K/UL — HIGH (ref 3.8–10.5)
WBC UR QL: 1 /HPF — SIGNIFICANT CHANGE UP (ref 0–5)

## 2024-03-29 PROCEDURE — 83690 ASSAY OF LIPASE: CPT

## 2024-03-29 PROCEDURE — 99285 EMERGENCY DEPT VISIT HI MDM: CPT

## 2024-03-29 PROCEDURE — 74177 CT ABD & PELVIS W/CONTRAST: CPT | Mod: 26,MC

## 2024-03-29 PROCEDURE — 36415 COLL VENOUS BLD VENIPUNCTURE: CPT

## 2024-03-29 PROCEDURE — 99285 EMERGENCY DEPT VISIT HI MDM: CPT | Mod: 25

## 2024-03-29 PROCEDURE — 85025 COMPLETE CBC W/AUTO DIFF WBC: CPT

## 2024-03-29 PROCEDURE — 87086 URINE CULTURE/COLONY COUNT: CPT

## 2024-03-29 PROCEDURE — 74177 CT ABD & PELVIS W/CONTRAST: CPT | Mod: MC

## 2024-03-29 PROCEDURE — 81001 URINALYSIS AUTO W/SCOPE: CPT

## 2024-03-29 PROCEDURE — 93005 ELECTROCARDIOGRAM TRACING: CPT

## 2024-03-29 PROCEDURE — 80053 COMPREHEN METABOLIC PANEL: CPT

## 2024-03-29 PROCEDURE — 93010 ELECTROCARDIOGRAM REPORT: CPT

## 2024-03-29 RX ORDER — ACETAMINOPHEN 500 MG
650 TABLET ORAL ONCE
Refills: 0 | Status: COMPLETED | OUTPATIENT
Start: 2024-03-29 | End: 2024-03-29

## 2024-03-29 RX ORDER — SODIUM CHLORIDE 9 MG/ML
1000 INJECTION INTRAMUSCULAR; INTRAVENOUS; SUBCUTANEOUS ONCE
Refills: 0 | Status: DISCONTINUED | OUTPATIENT
Start: 2024-03-29 | End: 2024-03-29

## 2024-03-29 RX ORDER — ACETAMINOPHEN 500 MG
1000 TABLET ORAL ONCE
Refills: 0 | Status: DISCONTINUED | OUTPATIENT
Start: 2024-03-29 | End: 2024-03-29

## 2024-03-29 RX ADMIN — Medication 650 MILLIGRAM(S): at 05:11

## 2024-03-29 NOTE — ED PROVIDER NOTE - CARE PROVIDER_API CALL
Ashley Shagufta  Surgery  [address] [place] [id number]  Phone: [phone]  Fax: [phone]  Follow Up Time:

## 2024-03-29 NOTE — ED ADULT NURSE REASSESSMENT NOTE - NS ED NURSE REASSESS COMMENT FT1
Pt is A&O4, pt able to ambulate safely and steadily w/out assistance, denies dizziness/weakness upon standing, patients IV was removed and the catheter is in tact, bleeding controlled, pt discharged home and paperwork was signed, reviewed with patient. Follow up treatment and plan for discharge was reviewed with the patient. Vital Signs recorded in the EMR. Pt education deemed successful at time of discharge after teach back proves proficiency. Pt has no distress at time of discharge, pt provided discharge instruction paperwork. Follow up with GI was reviewed and showed to patient on paperwork and son at the bedside, also informed them of the referrals coordinator with assistance of making any follow up appointments.

## 2024-03-29 NOTE — ED PROVIDER NOTE - OBJECTIVE STATEMENT
64 y/o female hx htn, hld, dm, cad p/w 1 day of generalized abd pain. took tylenol at 8pm with mild relief. no vomiting/diarrhea/f/c/urinary symptoms/cp. no other complaints. denies psh in abd. pain somewhat diffuse, more lower.

## 2024-03-29 NOTE — ED ADULT NURSE NOTE - CHIEF COMPLAINT
The patient is a 63y Female complaining of abdominal pain.
PAST MEDICAL HISTORY:  Hearing loss bilateral -- wears aids    OA (osteoarthritis) first carpometacarpal joint of right hand    Overactive bladder

## 2024-03-29 NOTE — ED PROVIDER NOTE - CLINICAL SUMMARY MEDICAL DECISION MAKING FREE TEXT BOX
Georgette: 64 y/o female hx htn, hld, dm, cad p/w 1 day of generalized abd pain. abd soft with mid ttp. no rebound/guarding. will check labs, urine, ct, pain control, reassess

## 2024-03-29 NOTE — ED PROVIDER NOTE - CARE PROVIDERS DIRECT ADDRESSES
[email or web address] [email or web address] [email or web address].[facility]

## 2024-03-29 NOTE — ED PROVIDER NOTE - PROGRESS NOTE DETAILS
Patient received as sign out. CT abd showing an incidental finding of bilateral breast nodules concerning for malignancy. Discussed results with patient and family at bedside - advised to follow up with surgery for possible biopsy/further evaluation. Answered all patient's questions regarding findings. Patient understands and in agreement with plan for discharge and close follow up.   Samer Fadia Perdomo MD

## 2024-03-29 NOTE — ED PROVIDER NOTE - PHYSICAL EXAMINATION
Gen: No acute distress, non toxic  HEENT: Mucous membranes moist, pink conjunctivae, EOMI  CV: RRR, nl s1/s2.  Resp: CTAB, normal rate and effort  GI: abd soft, ttp mid and lower abd. neg murphys. no rebound/guarding.   : No CVAT  Neuro: A&O x 3, moving all 4 extremities  MSK: No spine or joint tenderness to palpation  Skin: No rashes. intact and perfused.

## 2024-03-29 NOTE — ED ADULT TRIAGE NOTE - CHIEF COMPLAINT QUOTE
Ambulatory to ED with c/o mid abd pain x 1 day with dec po intake. denies N/V/D/ fevers. tylenol 2000.

## 2024-03-29 NOTE — ED PROVIDER NOTE - PATIENT PORTAL LINK FT
You can access the FollowMyHealth Patient Portal offered by [facility] by registering at the following website: [email or web address]. By joining [facility]’s FollowMyHealth portal, you will also be able to view your health information using other applications (apps) compatible with our system.

## 2024-03-29 NOTE — ED PROVIDER NOTE - NSPTACCESSSVCSAPPTDETAILS_ED_ALL_ED_FT
As part of our commitment to your overall care and recovery, we would like to check on you after your discharge.  You may receive intermittent non-urgent calls for approximately 30 days after your discharge to:   -Check on your recovery  -Assist with arranging follow-up appointments  -Answer prescription related questions  -Assist with other questions that you may have about your care    We understand that you are in your recovery period and these calls should be brief.    Thank you.  Tara Mackay RN BSN  Transition of Care RN  [phone]     Incidental lesion noted on CT in right breast, needs follow up with breast surgery

## 2024-03-29 NOTE — ED ADULT NURSE REASSESSMENT NOTE - NS ED NURSE REASSESS COMMENT FT1
Assuming care from RUTHIE Bertrand, review of patients history, reason for ED visit, medication administered, tests performed/to be performed, introduced to pt at bedside, updated patient as to the plan of care, pt education deemed successful after teach back shows proficiency.

## 2024-03-29 NOTE — ED PROVIDER NOTE - NSFOLLOWUPINSTRUCTIONS_ED_ALL_ED_FT
- Please follow up at the above clinic for further evaluation as discussed in the ED. Please bring a copy of the results provided to you on discharge to your doctor.   - Please return to the ED for any new or worsening symptoms.    PROCEDURE:  CT of the Abdomen and Pelvis was performed.  Sagittal and coronal reformats were performed.    FINDINGS:  LOWER CHEST: The included lung bases are clear aside for a 3 mm nodule in   the right lower lobe (image 21, series 3) and a 3 mm nodule in the left   lower lobe (image 18, series 3), stable at least since 2021. There is a   1.8 cm suspicious, irregular nodular density in the right breast,   developed since the prior study. There is also a 0.6 cm nodule in the   left breast.    LIVER: Within normal limits.  BILE DUCTS: Normal caliber.  GALLBLADDER: The gallbladder has a grossly unremarkable CT appearance.  SPLEEN: Within normal limits.  PANCREAS: Within normal limits.  ADRENALS: Mild thickening of the left adrenal gland is without   significant change from prior studies. The right adrenal gland appears   within normal limits.  KIDNEYS/URETERS: No hydroureteronephrosis. The kidneys enhance   symmetrically.    BLADDER: Limited due to underdistention.  REPRODUCTIVE ORGANS: Status post hysterectomy. No adnexal mass.    BOWEL: There is no evidence for bowel obstruction or  inflammation.   Diverticulosis is seen without evidence for diverticulitis. A normal   appendix is identified (best seen on sagittal image 89).  PERITONEUM: Trace pelvic fluid.  VESSELS:  Patent celiac axis, SMA and VERNELL. Hepatic veins, portal vein,   SMV, splenic vein and renal veins are patent. Trace aortoiliac   atherosclerotic changes are seen without aneurysm.  LYMPH NODES: No lymphadenopathy.  ABDOMINAL WALL: Within normal limits.  BONES: No suspicious osseous lesion. There is osseous demineralization.   Multilevel degenerative changes are seen throughout the visualized spine.  OTHER:  None    IMPRESSION:  There is a 1.8 cm suspicious, irregular nodular density in the right   breast, developed since the prior study and highly concerning for   neoplasm. There is also a nonspecific 0.6 cm nodule in the left breast.   Dedicated breast imaging is recommended for further evaluation. Dr. Lora Amin discussed this finding with Pearl at 10:33 am on 3/29/2024.    No CT acute pathology in the abdomen/pelvis to explain abdominal pain. No   evidence for bowel obstruction or calcification. Normal appendix.      --- End of Report ---

## 2024-04-01 LAB
CULTURE RESULTS: SIGNIFICANT CHANGE UP
SPECIMEN SOURCE: SIGNIFICANT CHANGE UP

## 2024-04-05 ENCOUNTER — RX ONLY (RX ONLY)
Age: 64
End: 2024-04-05

## 2024-04-05 ENCOUNTER — ASC (OUTPATIENT)
Dept: URBAN - METROPOLITAN AREA SURGERY 8 | Facility: SURGERY | Age: 64
Setting detail: OPHTHALMOLOGY
End: 2024-04-05
Payer: MEDICARE

## 2024-04-05 ENCOUNTER — OFFICE (OUTPATIENT)
Dept: URBAN - METROPOLITAN AREA CLINIC 94 | Facility: CLINIC | Age: 64
Setting detail: OPHTHALMOLOGY
End: 2024-04-05
Payer: MEDICARE

## 2024-04-05 DIAGNOSIS — H26.491: ICD-10-CM

## 2024-04-05 DIAGNOSIS — E11.9: ICD-10-CM

## 2024-04-05 DIAGNOSIS — H26.493: ICD-10-CM

## 2024-04-05 PROCEDURE — 66821 AFTER CATARACT LASER SURGERY: CPT | Mod: RT | Performed by: OPHTHALMOLOGY

## 2024-04-05 PROCEDURE — 92134 CPTRZ OPH DX IMG PST SGM RTA: CPT | Performed by: OPHTHALMOLOGY

## 2024-04-05 PROCEDURE — 99213 OFFICE O/P EST LOW 20 MIN: CPT | Mod: 57 | Performed by: OPHTHALMOLOGY

## 2024-04-05 ASSESSMENT — LID EXAM ASSESSMENTS
OS_BLEPHARITIS: LLL 1+
OD_BLEPHARITIS: RLL 1+

## 2024-04-10 ENCOUNTER — ASC (OUTPATIENT)
Dept: URBAN - METROPOLITAN AREA SURGERY 8 | Facility: SURGERY | Age: 64
Setting detail: OPHTHALMOLOGY
End: 2024-04-10
Payer: MEDICARE

## 2024-04-10 DIAGNOSIS — H26.492: ICD-10-CM

## 2024-04-10 PROCEDURE — 66821 AFTER CATARACT LASER SURGERY: CPT | Mod: 79,LT | Performed by: OPHTHALMOLOGY

## 2024-04-10 ASSESSMENT — LID EXAM ASSESSMENTS
OD_BLEPHARITIS: RLL 1+
OS_BLEPHARITIS: LLL 1+

## 2024-05-01 ENCOUNTER — OFFICE (OUTPATIENT)
Dept: URBAN - METROPOLITAN AREA CLINIC 116 | Facility: CLINIC | Age: 64
Setting detail: OPHTHALMOLOGY
End: 2024-05-01
Payer: MEDICARE

## 2024-05-01 DIAGNOSIS — H26.491: ICD-10-CM

## 2024-05-01 PROBLEM — H26.492 POSTERIOR CAPSULAR OPACIFICATION; RIGHT EYE, LEFT EYE: Status: ACTIVE | Noted: 2024-04-10

## 2024-05-01 PROCEDURE — 99024 POSTOP FOLLOW-UP VISIT: CPT | Performed by: OPTOMETRIST

## 2024-05-01 ASSESSMENT — LID EXAM ASSESSMENTS
OS_BLEPHARITIS: LLL 1+
OD_BLEPHARITIS: RLL 1+

## 2024-05-01 ASSESSMENT — CONFRONTATIONAL VISUAL FIELD TEST (CVF)
OD_FINDINGS: FULL
OS_FINDINGS: FULL

## 2024-06-04 NOTE — DISCHARGE NOTE ADULT - REHABILITATION ASSESSMENT
SVI CI HR TFC MAP TPRI   Baseline 31 2.9 95  69 1735   Test 51 5.2 102  70    % Change 57.7        Fluid responsive; MD notified      Statement Selected

## 2024-08-13 ENCOUNTER — APPOINTMENT (OUTPATIENT)
Dept: ORTHOPEDIC SURGERY | Facility: CLINIC | Age: 64
End: 2024-08-13
Payer: MEDICARE

## 2024-08-13 DIAGNOSIS — M25.512 PAIN IN LEFT SHOULDER: ICD-10-CM

## 2024-08-13 DIAGNOSIS — M75.90 BURSITIS OF UNSPECIFIED SHOULDER: ICD-10-CM

## 2024-08-13 DIAGNOSIS — M25.511 PAIN IN RIGHT SHOULDER: ICD-10-CM

## 2024-08-13 DIAGNOSIS — M75.50 BURSITIS OF UNSPECIFIED SHOULDER: ICD-10-CM

## 2024-08-13 PROCEDURE — 99205 OFFICE O/P NEW HI 60 MIN: CPT | Mod: 25

## 2024-08-13 PROCEDURE — 20610 DRAIN/INJ JOINT/BURSA W/O US: CPT | Mod: 50

## 2024-08-13 PROCEDURE — 73030 X-RAY EXAM OF SHOULDER: CPT | Mod: 50

## 2024-08-13 NOTE — ASSESSMENT
[FreeTextEntry1] : ASSESSMENT: [The patient was accompanied today and was assisted with explaining their complaints today.] The patient comes in today with chronic exacerbated bilateral shoulder pain.  She describes difficulty with overhead activities.  The symptoms are bothersome and affecting her ADLs.  Symptoms are consistent with bilateral shoulder tendinopathy, bursitis, impingement, weakness.  Treatment modalities were discussed, the patient elects for bilateral shoulder injections.  I also recommend physical therapy for her condition.   The patient was adequately and thoroughly informed of my assessment of their current condition(s).  - This may diminish bodily function for the extremity.  We discussed prognosis, treatment modalities including operative and nonoperative options for the above diagnostic assessment. As always, 2nd opinion is always provided as an option. For this, when accessible, I was able to review other physicians note(s) including reviewing other tests, imaging results as well as personally view these results for my own interpretation.      [Bilateral] SUBACROMIAL SHOULDER INJECTION     Indication:  subacromial bursitis/impingement, pain     Risk, benefits and alternatives were discussed with the patient. Potential complications include bleeding and infection. Alcohol was used to prep the area. Ethyl chloride spray was used as a topical anesthetic.  Using sterile technique, the injection needle was then directed from a standard posterior approach parallel to and inferior to the acromion.  A 21g needle was used to inject 5 mL of 1% Lidocaine and 2 mL Kenalog (10mg). No significant resistance was encountered. A bandage was applied. The patient tolerated the procedure well. Patient instructed to avoid strenuous activity for 2 day(s). Specifically counseled regarding the signs and symptoms of potential infection and instructed to present promptly to clinic or hospital if such signs and symptoms arise.   The patient was adequately and thoroughly informed of my assessment of their current condition(s).     DISCUSSION: 1.  Bilateral shoulder injections.  Activity modifications.  PT prescription provided. 2. [x] 3. [x]

## 2024-08-13 NOTE — HISTORY OF PRESENT ILLNESS
[FreeTextEntry1] : Abhishek is a 64-year-old female who presents today with chronic exacerbated bilateral shoulder pain.  She describes difficulty with overhead activities.  The symptoms are bothersome and affecting her ADLs. Somewhat unclear history during today's visit.

## 2024-08-13 NOTE — PHYSICAL EXAM
[de-identified] : Examination of the [right] shoulder reveals equal active and passive motion as compared to the contralateral side   There is a positive Speed, positive Chloe, positive Lowe, tenderness with anterior shoulder compression  Examination of the [left] shoulder reveals equal active and passive motion as compared to the contralateral side   There is a positive Speed, positive Chloe, positive Lowe, tenderness with anterior shoulder compression [de-identified] : [4] views of [right] shoulder were obtained today in my office and were seen by me and discussed with the patient. These [show findings consistent with AC arthropathy and mild glenohumeral OA]  [4] views of [left] shoulder were obtained today in my office and were seen by me and discussed with the patient. These [show findings consistent with AC arthropathy and mild glenohumeral OA] Lateral sided calcification

## 2024-11-18 ENCOUNTER — EMERGENCY (EMERGENCY)
Facility: HOSPITAL | Age: 64
LOS: 1 days | Discharge: HOME | End: 2024-11-18
Attending: STUDENT IN AN ORGANIZED HEALTH CARE EDUCATION/TRAINING PROGRAM
Payer: MEDICARE

## 2024-11-18 VITALS
OXYGEN SATURATION: 99 % | DIASTOLIC BLOOD PRESSURE: 73 MMHG | HEART RATE: 93 BPM | SYSTOLIC BLOOD PRESSURE: 124 MMHG | RESPIRATION RATE: 18 BRPM

## 2024-11-18 VITALS
RESPIRATION RATE: 18 BRPM | OXYGEN SATURATION: 99 % | HEART RATE: 117 BPM | SYSTOLIC BLOOD PRESSURE: 99 MMHG | TEMPERATURE: 98 F | DIASTOLIC BLOOD PRESSURE: 67 MMHG

## 2024-11-18 DIAGNOSIS — Z98.890 OTHER SPECIFIED POSTPROCEDURAL STATES: Chronic | ICD-10-CM

## 2024-11-18 LAB
ALBUMIN SERPL ELPH-MCNC: 2.1 G/DL — LOW (ref 3.3–5.2)
ALP SERPL-CCNC: 177 U/L — HIGH (ref 40–120)
ALT FLD-CCNC: 17 U/L — SIGNIFICANT CHANGE UP
ANION GAP SERPL CALC-SCNC: 9 MMOL/L — SIGNIFICANT CHANGE UP (ref 5–17)
APTT BLD: 35.6 SEC — SIGNIFICANT CHANGE UP (ref 24.5–35.6)
AST SERPL-CCNC: 16 U/L — SIGNIFICANT CHANGE UP
BASOPHILS # BLD AUTO: 0.09 K/UL — SIGNIFICANT CHANGE UP (ref 0–0.2)
BASOPHILS NFR BLD AUTO: 0.6 % — SIGNIFICANT CHANGE UP (ref 0–2)
BILIRUB SERPL-MCNC: 0.3 MG/DL — LOW (ref 0.4–2)
BLD GP AB SCN SERPL QL: SIGNIFICANT CHANGE UP
BUN SERPL-MCNC: 6.1 MG/DL — LOW (ref 8–20)
CALCIUM SERPL-MCNC: 8.1 MG/DL — LOW (ref 8.4–10.5)
CHLORIDE SERPL-SCNC: 98 MMOL/L — SIGNIFICANT CHANGE UP (ref 96–108)
CO2 SERPL-SCNC: 29 MMOL/L — SIGNIFICANT CHANGE UP (ref 22–29)
CREAT SERPL-MCNC: 0.49 MG/DL — LOW (ref 0.5–1.3)
EGFR: 104 ML/MIN/1.73M2 — SIGNIFICANT CHANGE UP
EOSINOPHIL # BLD AUTO: 0.14 K/UL — SIGNIFICANT CHANGE UP (ref 0–0.5)
EOSINOPHIL NFR BLD AUTO: 0.9 % — SIGNIFICANT CHANGE UP (ref 0–6)
FLUAV AG NPH QL: SIGNIFICANT CHANGE UP
FLUBV AG NPH QL: SIGNIFICANT CHANGE UP
GLUCOSE SERPL-MCNC: 144 MG/DL — HIGH (ref 70–99)
HCT VFR BLD CALC: 34.1 % — LOW (ref 34.5–45)
HGB BLD-MCNC: 11 G/DL — LOW (ref 11.5–15.5)
IMM GRANULOCYTES NFR BLD AUTO: 0.6 % — SIGNIFICANT CHANGE UP (ref 0–0.9)
INR BLD: 1.24 RATIO — HIGH (ref 0.85–1.16)
LIDOCAIN IGE QN: 84 U/L — HIGH (ref 22–51)
LYMPHOCYTES # BLD AUTO: 27.3 % — SIGNIFICANT CHANGE UP (ref 13–44)
LYMPHOCYTES # BLD AUTO: 4.16 K/UL — HIGH (ref 1–3.3)
MAGNESIUM SERPL-MCNC: 1.6 MG/DL — LOW (ref 1.8–2.6)
MCHC RBC-ENTMCNC: 24.9 PG — LOW (ref 27–34)
MCHC RBC-ENTMCNC: 32.3 G/DL — SIGNIFICANT CHANGE UP (ref 32–36)
MCV RBC AUTO: 77.3 FL — LOW (ref 80–100)
MONOCYTES # BLD AUTO: 1.31 K/UL — HIGH (ref 0–0.9)
MONOCYTES NFR BLD AUTO: 8.6 % — SIGNIFICANT CHANGE UP (ref 2–14)
NEUTROPHILS # BLD AUTO: 9.46 K/UL — HIGH (ref 1.8–7.4)
NEUTROPHILS NFR BLD AUTO: 62 % — SIGNIFICANT CHANGE UP (ref 43–77)
PLATELET # BLD AUTO: 424 K/UL — HIGH (ref 150–400)
POTASSIUM SERPL-MCNC: 3.6 MMOL/L — SIGNIFICANT CHANGE UP (ref 3.5–5.3)
POTASSIUM SERPL-SCNC: 3.6 MMOL/L — SIGNIFICANT CHANGE UP (ref 3.5–5.3)
PROT SERPL-MCNC: 6.4 G/DL — LOW (ref 6.6–8.7)
PROTHROM AB SERPL-ACNC: 14 SEC — HIGH (ref 9.9–13.4)
RBC # BLD: 4.41 M/UL — SIGNIFICANT CHANGE UP (ref 3.8–5.2)
RBC # FLD: 15.3 % — HIGH (ref 10.3–14.5)
RSV RNA NPH QL NAA+NON-PROBE: SIGNIFICANT CHANGE UP
SARS-COV-2 RNA SPEC QL NAA+PROBE: SIGNIFICANT CHANGE UP
SODIUM SERPL-SCNC: 136 MMOL/L — SIGNIFICANT CHANGE UP (ref 135–145)
WBC # BLD: 15.25 K/UL — HIGH (ref 3.8–10.5)
WBC # FLD AUTO: 15.25 K/UL — HIGH (ref 3.8–10.5)

## 2024-11-18 PROCEDURE — 86901 BLOOD TYPING SEROLOGIC RH(D): CPT

## 2024-11-18 PROCEDURE — 80053 COMPREHEN METABOLIC PANEL: CPT

## 2024-11-18 PROCEDURE — 74177 CT ABD & PELVIS W/CONTRAST: CPT | Mod: 26,MC

## 2024-11-18 PROCEDURE — 93005 ELECTROCARDIOGRAM TRACING: CPT

## 2024-11-18 PROCEDURE — 86850 RBC ANTIBODY SCREEN: CPT

## 2024-11-18 PROCEDURE — 85610 PROTHROMBIN TIME: CPT

## 2024-11-18 PROCEDURE — 85025 COMPLETE CBC W/AUTO DIFF WBC: CPT

## 2024-11-18 PROCEDURE — 93010 ELECTROCARDIOGRAM REPORT: CPT

## 2024-11-18 PROCEDURE — 87637 SARSCOV2&INF A&B&RSV AMP PRB: CPT

## 2024-11-18 PROCEDURE — 99285 EMERGENCY DEPT VISIT HI MDM: CPT | Mod: GC

## 2024-11-18 PROCEDURE — 74177 CT ABD & PELVIS W/CONTRAST: CPT | Mod: MC

## 2024-11-18 PROCEDURE — 83735 ASSAY OF MAGNESIUM: CPT

## 2024-11-18 PROCEDURE — 36415 COLL VENOUS BLD VENIPUNCTURE: CPT

## 2024-11-18 PROCEDURE — 83690 ASSAY OF LIPASE: CPT

## 2024-11-18 PROCEDURE — 99285 EMERGENCY DEPT VISIT HI MDM: CPT | Mod: 25

## 2024-11-18 PROCEDURE — 86900 BLOOD TYPING SEROLOGIC ABO: CPT

## 2024-11-18 PROCEDURE — 96374 THER/PROPH/DIAG INJ IV PUSH: CPT | Mod: XU

## 2024-11-18 PROCEDURE — 85730 THROMBOPLASTIN TIME PARTIAL: CPT

## 2024-11-18 PROCEDURE — 96375 TX/PRO/DX INJ NEW DRUG ADDON: CPT

## 2024-11-18 RX ORDER — ONDANSETRON HYDROCHLORIDE 2 MG/ML
4 INJECTION, SOLUTION INTRAMUSCULAR; INTRAVENOUS ONCE
Refills: 0 | Status: COMPLETED | OUTPATIENT
Start: 2024-11-18 | End: 2024-11-18

## 2024-11-18 RX ORDER — MAGNESIUM SULFATE IN 0.9% NACL 2 G/50 ML
2 INTRAVENOUS SOLUTION, PIGGYBACK (ML) INTRAVENOUS ONCE
Refills: 0 | Status: COMPLETED | OUTPATIENT
Start: 2024-11-18 | End: 2024-11-18

## 2024-11-18 RX ORDER — SODIUM CHLORIDE 9 MG/ML
1000 INJECTION, SOLUTION INTRAMUSCULAR; INTRAVENOUS; SUBCUTANEOUS ONCE
Refills: 0 | Status: COMPLETED | OUTPATIENT
Start: 2024-11-18 | End: 2024-11-18

## 2024-11-18 RX ORDER — ACETAMINOPHEN 500 MG
1000 TABLET ORAL ONCE
Refills: 0 | Status: COMPLETED | OUTPATIENT
Start: 2024-11-18 | End: 2024-11-18

## 2024-11-18 RX ADMIN — SODIUM CHLORIDE 1000 MILLILITER(S): 9 INJECTION, SOLUTION INTRAMUSCULAR; INTRAVENOUS; SUBCUTANEOUS at 02:18

## 2024-11-18 RX ADMIN — Medication 25 GRAM(S): at 04:30

## 2024-11-18 RX ADMIN — Medication 1000 MILLIGRAM(S): at 02:47

## 2024-11-18 RX ADMIN — Medication 1000 MILLILITER(S): at 05:15

## 2024-11-18 RX ADMIN — Medication 400 MILLIGRAM(S): at 02:17

## 2024-11-18 NOTE — ED ADULT NURSE REASSESSMENT NOTE - NS ED NURSE REASSESS COMMENT FT1
Episode occurred while working   His symptoms were Mouth started watering, & couldn't speak  The symptoms resolved spontaneously    No residual deficits  He was treated with blood thinners as he also had Afib at the time     pt breathing even and unlabored on room air. NAD. pt updated on POC.

## 2024-11-18 NOTE — ED ADULT NURSE NOTE - OBJECTIVE STATEMENT
pt comes into ED A&Ox4, c/o dizziness and diarrhea for the last 7 days. son stated that there was blood noted in the stool that has subsided. son notes poor PO intake as well. pt c/o abd pain, no other complaints of pain or discomfort at this time. pt breathing even and unlabored on room air.

## 2024-11-18 NOTE — ED PROVIDER NOTE - CLINICAL SUMMARY MEDICAL DECISION MAKING FREE TEXT BOX
64 year old female with hx UC presenting with 1 week of multiple episodes of watery diarrhea, decreased energy, decreased appetite. 64 year old female with hx UC presenting with 1 week of multiple episodes of watery diarrhea, decreased energy, decreased appetite. Work up significant for leukocytosis, hypomagnesia, CT demonstrating UC. Patient treated with IVF and magnesium repletion with improvement in symptoms. Tolerating PO. Son at bedside comfortable with taking patient home and caring for her, requesting dc. Discussed f/u with GI. Stable for dc. 64 year old female with hx UC presenting with 1 week of multiple episodes of watery diarrhea, decreased energy, decreased appetite. Work up significant for leukocytosis, hypomagnesia, CT demonstrating UC. Patient treated with IVF and magnesium repletion with improvement in symptoms. Tolerating PO. Witnessed ambulating with steady gait with walker in ED.  Son at bedside comfortable with taking patient home and caring for her, requesting dc. Discussed f/u with GI. Stable for dc.

## 2024-11-18 NOTE — ED PROVIDER NOTE - ATTENDING CONTRIBUTION TO CARE
64 year old female with PMHx ulcerative colitis (takes mesalamine), HTN, HLD, DM, CAD presenting for evaluation of 1 week of diarrhea, decreased appetite and generalized weakness. Per son at beside patient with frequent episodes of watery nonbloody diarrhea, generalized intermittent abdominal pain, nausea, increased tiredness. Denies fever, chills, vomiting, recent antibiotic use.    CONSTITUTIONAL: In no apparent distress.  HEENMT: Airway patent, normal appearing mouth, nose, throat, neck supple with full range of motion,  EYES: Pupils equal, round, Extra-ocular movement intact, eyes are clear b/l  CARDIAC: Regular rate and rhythm, Heart sounds S1 S2 present  RESPIRATORY: No respiratory distress. No stridor, Lungs sounds clear with good aeration bilaterally.   GASTROINTESTINAL: Abdomen soft, non-tender and non-distended, no rebound, no guarding and no masses.   MUSCULOSKELETAL: Spine appears normal, movement of extremities grossly intact.  NEUROLOGICAL: Alert and interactive, no focal deficits, tone is normal, moving all extremities well,  SKIN: Pale appearing, No cyanosis, no pallor, no jaundice, no rash    Hany HARRISON, personally saw the patient with the resident, and completed the key components of the history and physical exam. I then discussed the management plan with the resident.       Hany HARRISON, personally saw the patient with the resident, and completed the key components of the history and physical exam. I then discussed the management plan with the resident.

## 2024-11-18 NOTE — ED ADULT NURSE NOTE - NSFALLUNIVINTERV_ED_ALL_ED
Bed/Stretcher in lowest position, wheels locked, appropriate side rails in place/Call bell, personal items and telephone in reach/Instruct patient to call for assistance before getting out of bed/chair/stretcher/Non-slip footwear applied when patient is off stretcher/[place] to call system/Physically safe environment - no spills, clutter or unnecessary equipment/Purposeful proactive rounding/Room/bathroom lighting operational, light cord in reach

## 2024-11-18 NOTE — ED PROVIDER NOTE - PATIENT PORTAL LINK FT
You can access the FollowMyHealth Patient Portal offered by [facility] by registering at the following website: [email or web address]. By joining [facility]’s FollowMyHealth portal, you will also be able to view your health information using other applications (apps) compatible with our system.

## 2024-11-18 NOTE — ED ADULT TRIAGE NOTE - CHIEF COMPLAINT QUOTE
Pt BIB family d/t dizziness and diarrhea with liquid diarrhea especially after eating and drinking for past 7 days with poor PO intake.

## 2024-11-18 NOTE — ED PROVIDER NOTE - NSFOLLOWUPINSTRUCTIONS_ED_ALL_ED_FT
- Stay well hydrated  - Try to eat as much as possible - start with bland foods such as plain rice or bread. Add additional foods as tolerated    Follow up with the gastroenterologist within 1 week

## 2024-11-18 NOTE — ED PROVIDER NOTE - PHYSICAL EXAMINATION
Gen: chronically ill appearing thin female.   Head: normocephalic, atraumatic  EENT: EOMI, dry mucous membranes  Lung: no increased work of breathing, clear to auscultation bilaterally, speaking in full sentences  CV: regular rate, regular rhythm, normal s1/s2  Abd: soft, (+)generalized tenderness to palpation, non-distended  MSK: No edema, no visible deformities, full range of motion in all 4 extremities  Neuro: Awake, alert, no focal neurologic deficits

## 2024-11-18 NOTE — ED PROVIDER NOTE - OBJECTIVE STATEMENT
64 year old female with PMHx ulcerative colitis (takes mesalamine), HTN, HLD, DM, CAD presenting for evaluation of 1 week of diarrhea, decreased appetite and generalized weakness. Per son at beside patient with frequent episodes of watery nonbloody diarrhea, generalized abdominal pain, nausea, increased tiredness. Denies fever, chills, vomiting, recent antibiotic use.

## 2024-11-22 ENCOUNTER — INPATIENT (INPATIENT)
Facility: HOSPITAL | Age: 64
LOS: 3 days | Discharge: HOME | DRG: 641 | End: 2024-11-26
Attending: STUDENT IN AN ORGANIZED HEALTH CARE EDUCATION/TRAINING PROGRAM | Admitting: STUDENT IN AN ORGANIZED HEALTH CARE EDUCATION/TRAINING PROGRAM
Payer: MEDICARE

## 2024-11-22 VITALS
HEART RATE: 104 BPM | RESPIRATION RATE: 18 BRPM | OXYGEN SATURATION: 98 % | WEIGHT: 115.96 LBS | SYSTOLIC BLOOD PRESSURE: 101 MMHG | DIASTOLIC BLOOD PRESSURE: 66 MMHG | TEMPERATURE: 98 F

## 2024-11-22 DIAGNOSIS — R19.7 DIARRHEA, UNSPECIFIED: ICD-10-CM

## 2024-11-22 DIAGNOSIS — E11.9 TYPE 2 DIABETES MELLITUS WITHOUT COMPLICATIONS: ICD-10-CM

## 2024-11-22 DIAGNOSIS — I10 ESSENTIAL (PRIMARY) HYPERTENSION: ICD-10-CM

## 2024-11-22 DIAGNOSIS — R53.1 WEAKNESS: ICD-10-CM

## 2024-11-22 DIAGNOSIS — I25.10 ATHEROSCLEROTIC HEART DISEASE OF NATIVE CORONARY ARTERY WITHOUT ANGINA PECTORIS: ICD-10-CM

## 2024-11-22 DIAGNOSIS — R62.7 ADULT FAILURE TO THRIVE: ICD-10-CM

## 2024-11-22 DIAGNOSIS — Z98.890 OTHER SPECIFIED POSTPROCEDURAL STATES: Chronic | ICD-10-CM

## 2024-11-22 LAB
ALBUMIN SERPL ELPH-MCNC: 2 G/DL — LOW (ref 3.3–5.2)
ALP SERPL-CCNC: 171 U/L — HIGH (ref 40–120)
ALT FLD-CCNC: 17 U/L — SIGNIFICANT CHANGE UP
ANION GAP SERPL CALC-SCNC: 7 MMOL/L — SIGNIFICANT CHANGE UP (ref 5–17)
APPEARANCE UR: CLEAR — SIGNIFICANT CHANGE UP
AST SERPL-CCNC: 20 U/L — SIGNIFICANT CHANGE UP
BASOPHILS # BLD AUTO: 0.05 K/UL — SIGNIFICANT CHANGE UP (ref 0–0.2)
BASOPHILS NFR BLD AUTO: 0.5 % — SIGNIFICANT CHANGE UP (ref 0–2)
BILIRUB SERPL-MCNC: <0.2 MG/DL — LOW (ref 0.4–2)
BILIRUB UR-MCNC: NEGATIVE — SIGNIFICANT CHANGE UP
BUN SERPL-MCNC: 5.9 MG/DL — LOW (ref 8–20)
CALCIUM SERPL-MCNC: 8.1 MG/DL — LOW (ref 8.4–10.5)
CHLORIDE SERPL-SCNC: 101 MMOL/L — SIGNIFICANT CHANGE UP (ref 96–108)
CO2 SERPL-SCNC: 27 MMOL/L — SIGNIFICANT CHANGE UP (ref 22–29)
COLOR SPEC: YELLOW — SIGNIFICANT CHANGE UP
CREAT SERPL-MCNC: 0.41 MG/DL — LOW (ref 0.5–1.3)
DIFF PNL FLD: NEGATIVE — SIGNIFICANT CHANGE UP
EGFR: 108 ML/MIN/1.73M2 — SIGNIFICANT CHANGE UP
EOSINOPHIL # BLD AUTO: 0.05 K/UL — SIGNIFICANT CHANGE UP (ref 0–0.5)
EOSINOPHIL NFR BLD AUTO: 0.5 % — SIGNIFICANT CHANGE UP (ref 0–6)
GLUCOSE SERPL-MCNC: 133 MG/DL — HIGH (ref 70–99)
GLUCOSE UR QL: NEGATIVE MG/DL — SIGNIFICANT CHANGE UP
HCT VFR BLD CALC: 34.2 % — LOW (ref 34.5–45)
HGB BLD-MCNC: 11.1 G/DL — LOW (ref 11.5–15.5)
IMM GRANULOCYTES NFR BLD AUTO: 0.8 % — SIGNIFICANT CHANGE UP (ref 0–0.9)
KETONES UR-MCNC: NEGATIVE MG/DL — SIGNIFICANT CHANGE UP
LEUKOCYTE ESTERASE UR-ACNC: NEGATIVE — SIGNIFICANT CHANGE UP
LYMPHOCYTES # BLD AUTO: 3.43 K/UL — HIGH (ref 1–3.3)
LYMPHOCYTES # BLD AUTO: 31.3 % — SIGNIFICANT CHANGE UP (ref 13–44)
MCHC RBC-ENTMCNC: 25.1 PG — LOW (ref 27–34)
MCHC RBC-ENTMCNC: 32.5 G/DL — SIGNIFICANT CHANGE UP (ref 32–36)
MCV RBC AUTO: 77.4 FL — LOW (ref 80–100)
MONOCYTES # BLD AUTO: 0.57 K/UL — SIGNIFICANT CHANGE UP (ref 0–0.9)
MONOCYTES NFR BLD AUTO: 5.2 % — SIGNIFICANT CHANGE UP (ref 2–14)
NEUTROPHILS # BLD AUTO: 6.77 K/UL — SIGNIFICANT CHANGE UP (ref 1.8–7.4)
NEUTROPHILS NFR BLD AUTO: 61.7 % — SIGNIFICANT CHANGE UP (ref 43–77)
NITRITE UR-MCNC: NEGATIVE — SIGNIFICANT CHANGE UP
PH UR: 7.5 — SIGNIFICANT CHANGE UP (ref 5–8)
PLATELET # BLD AUTO: 450 K/UL — HIGH (ref 150–400)
POTASSIUM SERPL-MCNC: 4.4 MMOL/L — SIGNIFICANT CHANGE UP (ref 3.5–5.3)
POTASSIUM SERPL-SCNC: 4.4 MMOL/L — SIGNIFICANT CHANGE UP (ref 3.5–5.3)
PROT SERPL-MCNC: 6.5 G/DL — LOW (ref 6.6–8.7)
PROT UR-MCNC: NEGATIVE MG/DL — SIGNIFICANT CHANGE UP
RBC # BLD: 4.42 M/UL — SIGNIFICANT CHANGE UP (ref 3.8–5.2)
RBC # FLD: 15.2 % — HIGH (ref 10.3–14.5)
SODIUM SERPL-SCNC: 134 MMOL/L — LOW (ref 135–145)
SP GR SPEC: 1.01 — SIGNIFICANT CHANGE UP (ref 1–1.03)
UROBILINOGEN FLD QL: 0.2 MG/DL — SIGNIFICANT CHANGE UP (ref 0.2–1)
WBC # BLD: 10.96 K/UL — HIGH (ref 3.8–10.5)
WBC # FLD AUTO: 10.96 K/UL — HIGH (ref 3.8–10.5)

## 2024-11-22 PROCEDURE — 99223 1ST HOSP IP/OBS HIGH 75: CPT

## 2024-11-22 PROCEDURE — 71045 X-RAY EXAM CHEST 1 VIEW: CPT | Mod: 26

## 2024-11-22 PROCEDURE — 99285 EMERGENCY DEPT VISIT HI MDM: CPT

## 2024-11-22 PROCEDURE — 93010 ELECTROCARDIOGRAM REPORT: CPT

## 2024-11-22 RX ORDER — 0.9 % SODIUM CHLORIDE 0.9 %
1000 INTRAVENOUS SOLUTION INTRAVENOUS
Refills: 0 | Status: DISCONTINUED | OUTPATIENT
Start: 2024-11-22 | End: 2024-11-26

## 2024-11-22 RX ORDER — LYSINE HCL 500 MG
1 TABLET ORAL
Refills: 0 | Status: DISCONTINUED | OUTPATIENT
Start: 2024-11-22 | End: 2024-11-26

## 2024-11-22 RX ORDER — HYDROCORTISONE BUTYRATE 0.1 %
1 CREAM (GRAM) TOPICAL
Refills: 0 | DISCHARGE

## 2024-11-22 RX ORDER — FAMOTIDINE 20 MG/1
1 TABLET, FILM COATED ORAL
Refills: 0 | DISCHARGE

## 2024-11-22 RX ORDER — ACETAMINOPHEN 500MG 500 MG/1
650 TABLET, COATED ORAL EVERY 6 HOURS
Refills: 0 | Status: DISCONTINUED | OUTPATIENT
Start: 2024-11-22 | End: 2024-11-26

## 2024-11-22 RX ORDER — AMANTADINE HCL 100 MG
100 CAPSULE ORAL
Refills: 0 | Status: DISCONTINUED | OUTPATIENT
Start: 2024-11-22 | End: 2024-11-26

## 2024-11-22 RX ORDER — ACETAMINOPHEN, DIPHENHYDRAMINE HCL, PHENYLEPHRINE HCL 325; 25; 5 MG/1; MG/1; MG/1
3 TABLET ORAL AT BEDTIME
Refills: 0 | Status: DISCONTINUED | OUTPATIENT
Start: 2024-11-22 | End: 2024-11-26

## 2024-11-22 RX ORDER — SODIUM CHLORIDE 9 MG/ML
1000 INJECTION, SOLUTION INTRAMUSCULAR; INTRAVENOUS; SUBCUTANEOUS ONCE
Refills: 0 | Status: COMPLETED | OUTPATIENT
Start: 2024-11-22 | End: 2024-11-22

## 2024-11-22 RX ORDER — AMANTADINE HCL 100 MG
1 CAPSULE ORAL
Refills: 0 | DISCHARGE

## 2024-11-22 RX ORDER — GLUCAGON INJECTION, SOLUTION 0.5 MG/.1ML
1 INJECTION, SOLUTION SUBCUTANEOUS ONCE
Refills: 0 | Status: DISCONTINUED | OUTPATIENT
Start: 2024-11-22 | End: 2024-11-26

## 2024-11-22 RX ORDER — FAMOTIDINE 20 MG/1
40 TABLET, FILM COATED ORAL DAILY
Refills: 0 | Status: DISCONTINUED | OUTPATIENT
Start: 2024-11-22 | End: 2024-11-23

## 2024-11-22 RX ORDER — CARBIDOPA AND LEVODOPA 10; 100 MG/1; MG/1
1 TABLET ORAL
Refills: 0 | DISCHARGE

## 2024-11-22 RX ORDER — MESALAMINE 375 MG/1
2 CAPSULE, EXTENDED RELEASE ORAL
Refills: 0 | DISCHARGE

## 2024-11-22 RX ORDER — OPICAPONE 50 MG/1
1 CAPSULE ORAL
Refills: 0 | DISCHARGE

## 2024-11-22 RX ORDER — MESALAMINE 375 MG/1
500 CAPSULE, EXTENDED RELEASE ORAL
Refills: 0 | Status: DISCONTINUED | OUTPATIENT
Start: 2024-11-22 | End: 2024-11-26

## 2024-11-22 RX ORDER — DILTIAZEM HYDROCHLORIDE 240 MG/1
90 CAPSULE, COATED, EXTENDED RELEASE ORAL DAILY
Refills: 0 | Status: DISCONTINUED | OUTPATIENT
Start: 2024-11-22 | End: 2024-11-26

## 2024-11-22 RX ORDER — CYANOCOBALAMIN/FOLIC AC/VIT B6 1-2.2-25MG
1 TABLET ORAL DAILY
Refills: 0 | Status: DISCONTINUED | OUTPATIENT
Start: 2024-11-22 | End: 2024-11-26

## 2024-11-22 RX ORDER — NITROFURANTOIN 100 MG/1
100 CAPSULE ORAL
Refills: 0 | Status: DISCONTINUED | OUTPATIENT
Start: 2024-11-22 | End: 2024-11-26

## 2024-11-22 RX ORDER — FLUTICASONE PROPIONATE 220 MCG
1 AEROSOL WITH ADAPTER (GRAM) INHALATION
Refills: 0 | DISCHARGE

## 2024-11-22 RX ORDER — ONDANSETRON HYDROCHLORIDE 4 MG/1
4 TABLET, FILM COATED ORAL EVERY 8 HOURS
Refills: 0 | Status: DISCONTINUED | OUTPATIENT
Start: 2024-11-22 | End: 2024-11-26

## 2024-11-22 RX ORDER — GLIMEPIRIDE 1 MG/1
1 TABLET ORAL
Refills: 0 | DISCHARGE

## 2024-11-22 RX ORDER — HYDROCORTISONE BUTYRATE 0.1 %
1 CREAM (GRAM) TOPICAL DAILY
Refills: 0 | Status: DISCONTINUED | OUTPATIENT
Start: 2024-11-22 | End: 2024-11-26

## 2024-11-22 RX ORDER — MAGNESIUM, ALUMINUM HYDROXIDE 200-225/5
30 SUSPENSION, ORAL (FINAL DOSE FORM) ORAL EVERY 4 HOURS
Refills: 0 | Status: DISCONTINUED | OUTPATIENT
Start: 2024-11-22 | End: 2024-11-26

## 2024-11-22 RX ORDER — CARBIDOPA AND LEVODOPA 10; 100 MG/1; MG/1
1 TABLET ORAL
Refills: 0 | Status: DISCONTINUED | OUTPATIENT
Start: 2024-11-22 | End: 2024-11-26

## 2024-11-22 RX ORDER — ENALAPRIL MALEATE 2.5 MG/1
2.5 TABLET ORAL DAILY
Refills: 0 | Status: DISCONTINUED | OUTPATIENT
Start: 2024-11-22 | End: 2024-11-26

## 2024-11-22 RX ADMIN — SODIUM CHLORIDE 1000 MILLILITER(S): 9 INJECTION, SOLUTION INTRAMUSCULAR; INTRAVENOUS; SUBCUTANEOUS at 11:07

## 2024-11-22 NOTE — ED PROVIDER NOTE - ATTENDING CONTRIBUTION TO CARE
64 year old female with pmhx of ulcerative colitis, DM, HTN, CAD, TIA, parkinsons presents with diarrhea and decreased appetite. Per patients family, she has had nonbloody diarrhea and generalized fatigue for the past couple of weeks. Family states they are having a difficult time taking care of her at home as she is not wanting to eat, and normally ambulates with a walker but has had difficulty doing so lately due to fatigue. Family here seeking [facility] placement.

## 2024-11-22 NOTE — PROVIDER CONTACT NOTE (OTHER) - ASSESSMENT
PT eval orders received. Chart reviewed, contents noted. Please see note for full details. Pt tolerated evaluation fair, limited effort from patient. Pt reports pain 4/10 pre session located at abdomen and pain 4/10 post session. Pt declines need for pain intervention at this time. Pt left seated in bed after session with all lines intact, CBWR. RN made aware of pt status and participation in PT. PT will continue to follow.     d/c rec: [facility]    pts family educated on benefit of PT, PT POC, safe mobility, LE exercises and active participation

## 2024-11-22 NOTE — ED ADULT TRIAGE NOTE - CHIEF COMPLAINT QUOTE
BIBA from home. Pt endorses abd pain and diarrhea x a few days and refusing to eat and "my heartbeat was really fast since 3pm"

## 2024-11-22 NOTE — PHYSICAL THERAPY INITIAL EVALUATION ADULT - ADDITIONAL COMMENTS
per pt family, pt lives with family, uses RW at baseline however increased weakness requiring assist, 1 small VONNIE, no steps in home.

## 2024-11-22 NOTE — ED PROVIDER NOTE - CHIEF COMPLAINT
Group Topic: Coping Skills Education    Start Time: 9:00 AM  End Time: 10:00 AM    Focus: Reality Acceptance: Radical Acceptance  Number in attendance: 6    Attendance: Present  Patient Response: Attentive and Interactive    A presentation was given on the concept of radical acceptance (e.g., reality is what it is, everything has a cause(s), and life can be living even if there is pain) of painful realities and difficult emotions in the service of decreasing suffering. Pt equated reality acceptance to mindfulness. Pt shared that removing the label of good or bad can help an individual accept a situation more easily.     The patient is a 64y Female complaining of diarrhea.

## 2024-11-22 NOTE — H&P ADULT - NSHPPHYSICALEXAM_GEN_ALL_CORE
General: Chronically ill appearing female, lying in bed in NAD  HEENT: Normocephalic, atraumatic. No scleral icterus or conjunctival injection. EOMI. Dry mucous membranes. Oropharynx Neck:. Soft and supple.  Cardiac: tachycardic to low 100s, Peripheral pulses 2+ and symmetric. No LE edema.  Resp: Lungs CTAB. No accessory muscle use  Abd: Soft, non-distended. No guarding, rebound, or rigidity, mild tenderness present  Skin: No rashes, abrasions, or lacerations.  Neuro: AO x 4. Moves all extremities symmetrically. Motor strength and sensation grossly intact.  Psych: Appropriate mood and affect

## 2024-11-22 NOTE — ED PROVIDER NOTE - OBJECTIVE STATEMENT
64 year old female with pmhx of ulcerative colitis, DM, HTN, CAD, TIA, parkinsons presents with diarrhea and decreased appetite. Per patients family, she has had nonbloody diarrhea and generalized fatigue for the past couple of weeks. Family states they are having a difficult time taking care of her at home as she is not wanting to eat, and normally ambulates with a walker but has had difficulty doing so lately due to fatigue. Family here seeking [facility] placement. Saw her GI doctor earlier this month and had a normal colonoscopy performed at that time. Denies any worsening abdominal pain, sob, chest pain, vomiting, fever, focal neuro deficit or any other complaints at this time.

## 2024-11-22 NOTE — H&P ADULT - NSHPLABSRESULTS_GEN_ALL_CORE
LABS:                        11.1   10.96 )-----------( 450      ( 22 Nov 2024 11:05 )             34.2     11-22    134[L]  |  101  |  5.9[L]  ----------------------------<  133[H]  4.4   |  27.0  |  0.41[L]    Ca    8.1[L]      22 Nov 2024 11:05    TPro  6.5[L]  /  Alb  2.0[L]  /  TBili  <0.2[L]  /  DBili  x   /  AST  20  /  ALT  17  /  AlkPhos  171[H]  11-22

## 2024-11-22 NOTE — ED ADULT NURSE NOTE - OBJECTIVE STATEMENT
Pt BIBEMS for complaint of diarrhea. Pt's son states pt was recently treated and evaluated for same complaint last week. Pt's son states everything was normal and they were told to follow up with PCP but pt is stilling having diarrhea every night and has weakness. Pt's son request medicine for diarrhea as he said pt is too weak to be getting out of bed by herself at night to use bathroom. Pt tachycardic at triage, family at bedside, respirations even and unlabored on room air, no distress noted.

## 2024-11-22 NOTE — H&P ADULT - ASSESSMENT
Patient is a 64 year old female who is being managed as an inpatient for failure to thrive. She has a PMH of ulcerative colitis, DM, HTN, CAD, TIA, parkinsons. She has been progressively worsening in clinical status and her son feels that he cannot take care of the patient at home alone and is seeking higher level of care for her to get stronger. She has been feeding poorly and he says that occasionally at night she has loose bowel movements which he cannot manage alone.     Failure to thrive  - Needing higher level of care  - PT evaluated the patient and recommend [facility]  - Patient needs three nights stay for placement  - Follow up SW/CM    Parkinsons disease  Oropharyngeal dysphagia secondary to Parkinsons disease  - Continue home medications (med recs completed)  - Continue clear liquid diet for now  - Follow up SLP eval  - Consider Neuro evaluation if symptoms worsen    Diarrhea  Ulcerative colitis  - Saw her GI doctor earlier this month and had a normal colonoscopy performed   - CTAP done on 11/18 showing Diffuse wall thickening and pericolonic fat stranding extending from the cecum to the rectum, compatible with history of ulcerative colitis  - No need for repeat colonoscopy at this time  - Patient does not report any new BRBPR  - Follow up stool ova parasite and stool culture  - Consider GI evaluation if symptoms worsen    Right breast mass  - CTAP showing Increased size of a 2.1 cm irregular nodular density in the right breast, suggestive of malignancy  - Obtain gynecology consult in the morning    DM  - Hold home medications  - Started on sliding scale insulin only due to poor oral feeds  - Monitor POCT glucose and adjust regimen as indicated    HTN, CAD, TIA        DVT/GI ppx: SCD, Protonix  Diet: Clear liquid diet, ADAT; Follow up SLP recommendations  Code Status: Full code  Dispo: [facility], requiring three nights stay, plan for discharge on Monday, 11/25 Patient is a 64 year old female who is being managed as an inpatient for failure to thrive. She has a PMH of ulcerative colitis, DM, HTN, CAD, TIA, parkinsons. She has been progressively worsening in clinical status and her son feels that he cannot take care of the patient at home alone and is seeking higher level of care for her to get stronger. She has been feeding poorly and he says that occasionally at night she has loose bowel movements which he cannot manage alone.     Failure to thrive  - Needing higher level of care  - PT evaluated the patient and recommend [facility]  - Patient needs three nights stay for placement  - Follow up SW/CM    Parkinsons disease  Oropharyngeal dysphagia secondary to Parkinsons disease  - Continue home medications (med recs completed)  - Continue clear liquid diet for now  - Follow up SLP eval  - Consider Neuro evaluation if symptoms worsen    Diarrhea  Ulcerative colitis  - Saw her GI doctor earlier this month and had a normal colonoscopy performed   - CTAP done on 11/18 showing Diffuse wall thickening and pericolonic fat stranding extending from the cecum to the rectum, compatible with history of ulcerative colitis  - No need for repeat colonoscopy at this time  - Patient does not report any new BRBPR  - Follow up stool ova parasite and stool culture  - Continue mesalamine, anusol, PPI  - Consider GI evaluation if symptoms worsen    Right breast mass  - CTAP showing Increased size of a 2.1 cm irregular nodular density in the right breast, suggestive of malignancy  - Obtain gynecology consult in the morning    DM  - Hold home medications  - Started on sliding scale insulin only due to poor oral feeds  - Monitor POCT glucose and adjust regimen as indicated    HTN, CAD, TIA  - Continue aspirin, statin, enalapril, diltiazem      DVT/GI ppx: SCD, Protonix  Diet: Clear liquid diet, ADAT; Follow up SLP recommendations  Code Status: Full code  Dispo: [facility], requiring three nights stay, plan for discharge on Monday, 11/25

## 2024-11-22 NOTE — ED PROVIDER NOTE - CLINICAL SUMMARY MEDICAL DECISION MAKING FREE TEXT BOX
64 year old female with pmhx of ulcerative colitis, DM, HTN, CAD, TIA, parkinsons presents with diarrhea and decreased appetite. Per patients family, she has had nonbloody diarrhea and generalized fatigue for the past couple of weeks. Family states they are having a difficult time taking care of her at home as she is not wanting to eat, and normally ambulates with a walker but has had difficulty doing so lately due to fatigue. Family here seeking [facility] placement.

## 2024-11-22 NOTE — PHYSICAL THERAPY INITIAL EVALUATION ADULT - PERTINENT HX OF CURRENT PROBLEM, REHAB EVAL
64 year old female with pmhx of ulcerative colitis, DM, HTN, CAD, TIA, parkinsons presents with diarrhea and decreased appetite. Per patients family, she has had nonbloody diarrhea and generalized fatigue for the past couple of weeks. Family states they are having a difficult time taking care of her at home as she is not wanting to eat, and normally ambulates with a walker but has had difficulty doing so lately due to fatigue. Family here seeking [facility] placement.

## 2024-11-22 NOTE — H&P ADULT - HISTORY OF PRESENT ILLNESS
64 year old female with pmhx of ulcerative colitis, DM, HTN, CAD, TIA, parkinsons presents with diarrhea and decreased appetite. Per patients family, she has had nonbloody diarrhea and generalized fatigue for the past couple of weeks. Family states they are having a difficult time taking care of her at home as she is not wanting to eat, and normally ambulates with a walker but has had difficulty doing so lately due to fatigue. Family here seeking [facility] placement. Saw her GI doctor earlier this month and had a normal colonoscopy performed at that time. Denies any worsening abdominal pain (has baseline mild pain due to UC), sob, chest pain, vomiting, fever, focal neuro deficit or any other complaints at this time. Patient lives with her son and daughter in law but due to declining functional status and poor appetite, they are afraid they are unable to take care of their mother.

## 2024-11-22 NOTE — ED ADULT NURSE NOTE - NS ED NURSE REPORT GIVEN DT
Alert-The patient is alert, awake and responds to voice. The patient is oriented to time, place, and person. The triage nurse is able to obtain subjective information. 22-Nov-2024 21:45

## 2024-11-22 NOTE — ED ADULT NURSE NOTE - NSFALLRISKINTERV_ED_ALL_ED

## 2024-11-22 NOTE — ED PROVIDER NOTE - PHYSICAL EXAMINATION
General: Chronically ill appearing female, lying in bed in NAD  HEENT: Normocephalic, atraumatic. No scleral icterus or conjunctival injection. EOMI. Dry mucous membranes. Oropharynx clear.   Neck:. Soft and supple.  Cardiac: tachycardic to low 100s, Peripheral pulses 2+ and symmetric. No LE edema.  Resp: Lungs CTAB. No accessory muscle use  Abd: Soft, non-tender, non-distended. No guarding, rebound, or rigidity.  Back: Spine midline and non-tender.   Skin: No rashes, abrasions, or lacerations.  Neuro: AO x 4. Moves all extremities symmetrically. Motor strength and sensation grossly intact.  Psych: Appropriate mood and affect

## 2024-11-23 LAB
A1C WITH ESTIMATED AVERAGE GLUCOSE RESULT: 7.7 % — HIGH (ref 4–5.6)
ALBUMIN SERPL ELPH-MCNC: 1.8 G/DL — LOW (ref 3.3–5.2)
ALP SERPL-CCNC: 159 U/L — HIGH (ref 40–120)
ALT FLD-CCNC: <5 U/L — SIGNIFICANT CHANGE UP
ANION GAP SERPL CALC-SCNC: 9 MMOL/L — SIGNIFICANT CHANGE UP (ref 5–17)
AST SERPL-CCNC: 20 U/L — SIGNIFICANT CHANGE UP
BILIRUB SERPL-MCNC: 0.3 MG/DL — LOW (ref 0.4–2)
BUN SERPL-MCNC: 3.9 MG/DL — LOW (ref 8–20)
CALCIUM SERPL-MCNC: 7.9 MG/DL — LOW (ref 8.4–10.5)
CHLORIDE SERPL-SCNC: 105 MMOL/L — SIGNIFICANT CHANGE UP (ref 96–108)
CO2 SERPL-SCNC: 23 MMOL/L — SIGNIFICANT CHANGE UP (ref 22–29)
CREAT SERPL-MCNC: 0.31 MG/DL — LOW (ref 0.5–1.3)
EGFR: 116 ML/MIN/1.73M2 — SIGNIFICANT CHANGE UP
ESTIMATED AVERAGE GLUCOSE: 174 MG/DL — HIGH (ref 68–114)
GLUCOSE BLDC GLUCOMTR-MCNC: 104 MG/DL — HIGH (ref 70–99)
GLUCOSE BLDC GLUCOMTR-MCNC: 118 MG/DL — HIGH (ref 70–99)
GLUCOSE BLDC GLUCOMTR-MCNC: 131 MG/DL — HIGH (ref 70–99)
GLUCOSE BLDC GLUCOMTR-MCNC: 155 MG/DL — HIGH (ref 70–99)
GLUCOSE BLDC GLUCOMTR-MCNC: 72 MG/DL — SIGNIFICANT CHANGE UP (ref 70–99)
GLUCOSE SERPL-MCNC: 103 MG/DL — HIGH (ref 70–99)
HCT VFR BLD CALC: 32.7 % — LOW (ref 34.5–45)
HGB BLD-MCNC: 10.7 G/DL — LOW (ref 11.5–15.5)
MAGNESIUM SERPL-MCNC: 1.8 MG/DL — SIGNIFICANT CHANGE UP (ref 1.6–2.6)
MCHC RBC-ENTMCNC: 25.1 PG — LOW (ref 27–34)
MCHC RBC-ENTMCNC: 32.7 G/DL — SIGNIFICANT CHANGE UP (ref 32–36)
MCV RBC AUTO: 76.8 FL — LOW (ref 80–100)
PHOSPHATE SERPL-MCNC: 2.8 MG/DL — SIGNIFICANT CHANGE UP (ref 2.4–4.7)
PLATELET # BLD AUTO: 429 K/UL — HIGH (ref 150–400)
POTASSIUM SERPL-MCNC: 3.9 MMOL/L — SIGNIFICANT CHANGE UP (ref 3.5–5.3)
POTASSIUM SERPL-SCNC: 3.9 MMOL/L — SIGNIFICANT CHANGE UP (ref 3.5–5.3)
PROT SERPL-MCNC: 6 G/DL — LOW (ref 6.6–8.7)
RBC # BLD: 4.26 M/UL — SIGNIFICANT CHANGE UP (ref 3.8–5.2)
RBC # FLD: 15.9 % — HIGH (ref 10.3–14.5)
SODIUM SERPL-SCNC: 137 MMOL/L — SIGNIFICANT CHANGE UP (ref 135–145)
WBC # BLD: 16.39 K/UL — HIGH (ref 3.8–10.5)
WBC # FLD AUTO: 16.39 K/UL — HIGH (ref 3.8–10.5)

## 2024-11-23 PROCEDURE — 99232 SBSQ HOSP IP/OBS MODERATE 35: CPT

## 2024-11-23 RX ORDER — PANTOPRAZOLE SODIUM 40 MG/1
40 TABLET, DELAYED RELEASE ORAL EVERY 12 HOURS
Refills: 0 | Status: DISCONTINUED | OUTPATIENT
Start: 2024-11-23 | End: 2024-11-26

## 2024-11-23 RX ORDER — INFLUENZA VIRUS VACCINE 15; 15; 15; 15 UG/.5ML; UG/.5ML; UG/.5ML; UG/.5ML
0.5 SUSPENSION INTRAMUSCULAR ONCE
Refills: 0 | Status: DISCONTINUED | OUTPATIENT
Start: 2024-11-23 | End: 2024-11-26

## 2024-11-23 RX ADMIN — ACETAMINOPHEN, DIPHENHYDRAMINE HCL, PHENYLEPHRINE HCL 3 MILLIGRAM(S): 325; 25; 5 TABLET ORAL at 00:33

## 2024-11-23 RX ADMIN — Medication 100 MILLIGRAM(S): at 18:00

## 2024-11-23 RX ADMIN — Medication 1 TABLET(S): at 18:00

## 2024-11-23 RX ADMIN — CARBIDOPA AND LEVODOPA 1 TABLET(S): 10; 100 TABLET ORAL at 05:22

## 2024-11-23 RX ADMIN — Medication 80 MILLIGRAM(S): at 00:26

## 2024-11-23 RX ADMIN — CARBIDOPA AND LEVODOPA 1 TABLET(S): 10; 100 TABLET ORAL at 18:00

## 2024-11-23 RX ADMIN — NITROFURANTOIN 100 MILLIGRAM(S): 100 CAPSULE ORAL at 18:00

## 2024-11-23 RX ADMIN — Medication 30 MILLILITER(S): at 11:12

## 2024-11-23 RX ADMIN — CARBIDOPA AND LEVODOPA 1 TABLET(S): 10; 100 TABLET ORAL at 00:26

## 2024-11-23 RX ADMIN — Medication 81 MILLIGRAM(S): at 11:12

## 2024-11-23 RX ADMIN — Medication 1 TABLET(S): at 11:12

## 2024-11-23 RX ADMIN — DILTIAZEM HYDROCHLORIDE 90 MILLIGRAM(S): 240 CAPSULE, COATED, EXTENDED RELEASE ORAL at 05:23

## 2024-11-23 RX ADMIN — Medication 100 MILLIGRAM(S): at 05:22

## 2024-11-23 RX ADMIN — ONDANSETRON HYDROCHLORIDE 4 MILLIGRAM(S): 4 TABLET, FILM COATED ORAL at 18:18

## 2024-11-23 RX ADMIN — Medication 80 MILLIGRAM(S): at 22:01

## 2024-11-23 RX ADMIN — Medication 1 TABLET(S): at 05:23

## 2024-11-23 RX ADMIN — NITROFURANTOIN 100 MILLIGRAM(S): 100 CAPSULE ORAL at 05:22

## 2024-11-23 RX ADMIN — PANTOPRAZOLE SODIUM 40 MILLIGRAM(S): 40 TABLET, DELAYED RELEASE ORAL at 18:01

## 2024-11-23 RX ADMIN — Medication 1 TABLET(S): at 00:25

## 2024-11-23 RX ADMIN — Medication 2: at 12:35

## 2024-11-23 RX ADMIN — NITROFURANTOIN 100 MILLIGRAM(S): 100 CAPSULE ORAL at 00:26

## 2024-11-23 RX ADMIN — ENALAPRIL MALEATE 2.5 MILLIGRAM(S): 2.5 TABLET ORAL at 05:23

## 2024-11-23 RX ADMIN — CARBIDOPA AND LEVODOPA 1 TABLET(S): 10; 100 TABLET ORAL at 11:13

## 2024-11-23 RX ADMIN — MESALAMINE 500 MILLIGRAM(S): 375 CAPSULE, EXTENDED RELEASE ORAL at 05:21

## 2024-11-23 RX ADMIN — ONDANSETRON HYDROCHLORIDE 4 MILLIGRAM(S): 4 TABLET, FILM COATED ORAL at 05:10

## 2024-11-23 RX ADMIN — MESALAMINE 500 MILLIGRAM(S): 375 CAPSULE, EXTENDED RELEASE ORAL at 18:00

## 2024-11-23 NOTE — PROGRESS NOTE ADULT - ASSESSMENT
Patient is a 64 year old female who is being managed as an inpatient for failure to thrive. She has a PMH of ulcerative colitis, DM, HTN, CAD, TIA, parkinsons. She has been progressively worsening in clinical status and her son feels that he cannot take care of the patient at home alone and is seeking higher level of care for her to get stronger. She has been feeding poorly and he says that occasionally at night she has loose bowel movements which he cannot manage alone.     Failure to thrive  - Needing higher level of care  - PT evaluated the patient and recommend [facility]  - Patient needs three nights stay for placement  - Follow up SW/CM    Parkinsons disease  Oropharyngeal dysphagia secondary to Parkinsons disease  - Continue home medications (med recs completed)  - Continue clear liquid diet for now  - Follow up SLP eval  - Consider Neuro evaluation if symptoms worsen    Diarrhea  Ulcerative colitis  - Saw her GI doctor earlier this month and had a normal colonoscopy performed   - CTAP done on 11/18 showing Diffuse wall thickening and pericolonic fat stranding extending from the cecum to the rectum, compatible with history of ulcerative colitis  - No need for repeat colonoscopy at this time  - Patient does not report any new BRBPR  - Follow up stool ova parasite and stool culture  - Continue mesalamine, anusol, PPI  - Consider GI evaluation if symptoms worsen    Right breast mass  - CTAP showing Increased size of a 2.1 cm irregular nodular density in the right breast, suggestive of malignancy  - Obtain gynecology consult in the morning    DM  - Hold home medications  - Started on sliding scale insulin only due to poor oral feeds  - Monitor POCT glucose and adjust regimen as indicated    HTN, CAD, TIA  - Continue aspirin, statin, enalapril, diltiazem      DVT/GI ppx: SCD, Protonix  Diet: Clear liquid diet, ADAT; Follow up SLP recommendations  Code Status: Full code  Dispo: [facility], requiring three nights stay, plan for discharge on Monday, 11/25 Patient is a 64 year old female who is being managed as an inpatient for failure to thrive. She has a PMH of ulcerative colitis, DM, HTN, CAD, TIA, parkinsons. She has been progressively worsening in clinical status and her son feels that he cannot take care of the patient at home alone and is seeking higher level of care for her to get stronger. She has been feeding poorly and he says that occasionally at night she has loose bowel movements which he cannot manage alone.     Failure to thrive  - Needing higher level of care  - PT evaluated the patient and recommend [facility]  - Patient needs three nights stay for placement  - Follow up SW/CM  - Likely with dyspepsia that is contributing to her FTT  - Start Protonix 40mg IV q12, can switch to PO on d/c  - Maalox prn    Parkinsons disease  Oropharyngeal dysphagia secondary to Parkinsons disease  - Continue home medications (med recs completed)  - Continue clear liquid diet for now  - Follow up SLP eval, still pending  - Consider Neuro evaluation if symptoms worsen    Diarrhea (resolved)  Ulcerative colitis  - Saw her GI doctor earlier this month and had a normal colonoscopy performed   - CTAP done on 11/18 showing Diffuse wall thickening and pericolonic fat stranding extending from the cecum to the rectum, compatible with history of ulcerative colitis  - No need for repeat colonoscopy at this time  - Continue mesalamine, anusol, PPI  - Consider GI evaluation if symptoms worsen    Right breast mass  - CTAP showing Increased size of a 2.1 cm irregular nodular density in the right breast, suggestive of malignancy  - Outpatient breast surgery appt    DM  - Hold home medications  - Sliding scale insulin only due to poor oral feeds  - Monitor POCT glucose and adjust regimen as indicated    HTN, CAD, TIA  - Continue aspirin, statin, enalapril, diltiazem      DVT/GI ppx: SCD, Protonix  Diet: Clear liquid diet, ADAT; Follow up SLP recommendations  Code Status: Full code  Dispo: [facility], requiring three nights stay, plan for discharge on Monday, 11/25

## 2024-11-23 NOTE — PATIENT PROFILE ADULT - FALL HARM RISK - HARM RISK INTERVENTIONS

## 2024-11-24 LAB
ANION GAP SERPL CALC-SCNC: 11 MMOL/L — SIGNIFICANT CHANGE UP (ref 5–17)
BUN SERPL-MCNC: 3.9 MG/DL — LOW (ref 8–20)
CALCIUM SERPL-MCNC: 7.7 MG/DL — LOW (ref 8.4–10.5)
CHLORIDE SERPL-SCNC: 98 MMOL/L — SIGNIFICANT CHANGE UP (ref 96–108)
CO2 SERPL-SCNC: 25 MMOL/L — SIGNIFICANT CHANGE UP (ref 22–29)
CREAT SERPL-MCNC: 0.3 MG/DL — LOW (ref 0.5–1.3)
EGFR: 117 ML/MIN/1.73M2 — SIGNIFICANT CHANGE UP
GLUCOSE BLDC GLUCOMTR-MCNC: 110 MG/DL — HIGH (ref 70–99)
GLUCOSE BLDC GLUCOMTR-MCNC: 130 MG/DL — HIGH (ref 70–99)
GLUCOSE BLDC GLUCOMTR-MCNC: 132 MG/DL — HIGH (ref 70–99)
GLUCOSE SERPL-MCNC: 162 MG/DL — HIGH (ref 70–99)
HCT VFR BLD CALC: 36 % — SIGNIFICANT CHANGE UP (ref 34.5–45)
HGB BLD-MCNC: 11.7 G/DL — SIGNIFICANT CHANGE UP (ref 11.5–15.5)
MAGNESIUM SERPL-MCNC: 1.7 MG/DL — SIGNIFICANT CHANGE UP (ref 1.6–2.6)
MCHC RBC-ENTMCNC: 25.2 PG — LOW (ref 27–34)
MCHC RBC-ENTMCNC: 32.5 G/DL — SIGNIFICANT CHANGE UP (ref 32–36)
MCV RBC AUTO: 77.4 FL — LOW (ref 80–100)
PHOSPHATE SERPL-MCNC: 3.2 MG/DL — SIGNIFICANT CHANGE UP (ref 2.4–4.7)
PLATELET # BLD AUTO: 398 K/UL — SIGNIFICANT CHANGE UP (ref 150–400)
POTASSIUM SERPL-MCNC: 3.4 MMOL/L — LOW (ref 3.5–5.3)
POTASSIUM SERPL-SCNC: 3.4 MMOL/L — LOW (ref 3.5–5.3)
RBC # BLD: 4.65 M/UL — SIGNIFICANT CHANGE UP (ref 3.8–5.2)
RBC # FLD: 15.6 % — HIGH (ref 10.3–14.5)
SODIUM SERPL-SCNC: 134 MMOL/L — LOW (ref 135–145)
WBC # BLD: 16.8 K/UL — HIGH (ref 3.8–10.5)
WBC # FLD AUTO: 16.8 K/UL — HIGH (ref 3.8–10.5)

## 2024-11-24 PROCEDURE — 99233 SBSQ HOSP IP/OBS HIGH 50: CPT

## 2024-11-24 RX ORDER — POTASSIUM CHLORIDE 600 MG/1
40 TABLET, EXTENDED RELEASE ORAL ONCE
Refills: 0 | Status: COMPLETED | OUTPATIENT
Start: 2024-11-24 | End: 2024-11-24

## 2024-11-24 RX ADMIN — ONDANSETRON HYDROCHLORIDE 4 MILLIGRAM(S): 4 TABLET, FILM COATED ORAL at 20:17

## 2024-11-24 RX ADMIN — DILTIAZEM HYDROCHLORIDE 90 MILLIGRAM(S): 240 CAPSULE, COATED, EXTENDED RELEASE ORAL at 05:23

## 2024-11-24 RX ADMIN — MESALAMINE 500 MILLIGRAM(S): 375 CAPSULE, EXTENDED RELEASE ORAL at 05:23

## 2024-11-24 RX ADMIN — CARBIDOPA AND LEVODOPA 1 TABLET(S): 10; 100 TABLET ORAL at 05:24

## 2024-11-24 RX ADMIN — CARBIDOPA AND LEVODOPA 1 TABLET(S): 10; 100 TABLET ORAL at 00:27

## 2024-11-24 RX ADMIN — Medication 1 TABLET(S): at 05:24

## 2024-11-24 RX ADMIN — Medication 100 MILLIGRAM(S): at 05:24

## 2024-11-24 RX ADMIN — NITROFURANTOIN 100 MILLIGRAM(S): 100 CAPSULE ORAL at 17:27

## 2024-11-24 RX ADMIN — CARBIDOPA AND LEVODOPA 1 TABLET(S): 10; 100 TABLET ORAL at 17:28

## 2024-11-24 RX ADMIN — MESALAMINE 500 MILLIGRAM(S): 375 CAPSULE, EXTENDED RELEASE ORAL at 17:27

## 2024-11-24 RX ADMIN — CARBIDOPA AND LEVODOPA 1 TABLET(S): 10; 100 TABLET ORAL at 10:51

## 2024-11-24 RX ADMIN — PANTOPRAZOLE SODIUM 40 MILLIGRAM(S): 40 TABLET, DELAYED RELEASE ORAL at 17:27

## 2024-11-24 RX ADMIN — PANTOPRAZOLE SODIUM 40 MILLIGRAM(S): 40 TABLET, DELAYED RELEASE ORAL at 05:24

## 2024-11-24 RX ADMIN — Medication 30 MILLILITER(S): at 10:51

## 2024-11-24 RX ADMIN — Medication 1 TABLET(S): at 17:27

## 2024-11-24 RX ADMIN — NITROFURANTOIN 100 MILLIGRAM(S): 100 CAPSULE ORAL at 05:24

## 2024-11-24 RX ADMIN — Medication 1 TABLET(S): at 10:52

## 2024-11-24 RX ADMIN — Medication 81 MILLIGRAM(S): at 10:50

## 2024-11-24 RX ADMIN — ENALAPRIL MALEATE 2.5 MILLIGRAM(S): 2.5 TABLET ORAL at 05:23

## 2024-11-24 RX ADMIN — Medication 100 MILLIGRAM(S): at 17:27

## 2024-11-24 RX ADMIN — POTASSIUM CHLORIDE 40 MILLIEQUIVALENT(S): 600 TABLET, EXTENDED RELEASE ORAL at 10:51

## 2024-11-24 RX ADMIN — Medication 80 MILLIGRAM(S): at 22:14

## 2024-11-24 NOTE — DIETITIAN INITIAL EVALUATION ADULT - ADD RECOMMEND
- Monitor weights daily for trend/accuracy  - Continue diet as tolerated.   - Provide encouragement/assistance as needed during mealtimes to inc PO   - Rx MVI daily, vit C 500mg

## 2024-11-24 NOTE — DIETITIAN INITIAL EVALUATION ADULT - PERTINENT MEDS FT
MEDICATIONS  (STANDING):  calcium carbonate 1250 mG  + Vitamin D (OsCal 500 + D) 1 Tablet(s) Oral two times a day  dextrose 5%. 1000 milliLiter(s) (100 mL/Hr) IV Continuous <Continuous>  insulin lispro (ADMELOG) corrective regimen sliding scale   SubCutaneous three times a day before meals  multivitamin 1 Tablet(s) Oral daily  pantoprazole  Injectable 40 milliGRAM(s) IV Push every 12 hours

## 2024-11-24 NOTE — DIETITIAN INITIAL EVALUATION ADULT - ETIOLOGY
related to inability to meet sufficient protein-energy needs in setting of failure to thrive SB, 58 bpm

## 2024-11-24 NOTE — DIETITIAN INITIAL EVALUATION ADULT - PERTINENT LABORATORY DATA
11-24    134[L]  |  98  |  3.9[L]  ----------------------------<  162[H]  3.4[L]   |  25.0  |  0.30[L]    Ca    7.7[L]      24 Nov 2024 07:00  Phos  3.2     11-24  Mg     1.7     11-24    TPro  6.0[L]  /  Alb  1.8[L]  /  TBili  0.3[L]  /  DBili  x   /  AST  20  /  ALT  <5  /  AlkPhos  159[H]  11-23  POCT Blood Glucose.: 132 mg/dL (11-24-24 @ 12:21)  A1C with Estimated Average Glucose Result: 7.7 % (11-23-24 @ 04:23)

## 2024-11-24 NOTE — PROGRESS NOTE ADULT - ASSESSMENT
Patient is a 64 year old female who is being managed as an inpatient for failure to thrive. She has a PMH of ulcerative colitis, DM, HTN, CAD, TIA, parkinsons. She has been progressively worsening in clinical status and her son feels that he cannot take care of the patient at home alone and is seeking higher level of care for her to get stronger. She has been feeding poorly and he says that occasionally at night she has loose bowel movements which he cannot manage alone.     Failure to thrive  - Needing higher level of care  - PT evaluated the patient and recommend [facility]  - Patient needs three nights stay for placement  - Follow up SW/CM  - Likely with dyspepsia that is contributing to her FTT  - Start Protonix 40mg IV q12, can switch to PO on d/c  - Maalox prn    Parkinsons disease  Oropharyngeal dysphagia secondary to Parkinsons disease  - Continue home medications (med recs completed)  - advance to puree diet today  - Follow up SLP eval, still pending  - Consider Neuro evaluation if symptoms worsen    Diarrhea (resolved)  Ulcerative colitis  - Saw her GI doctor earlier this month and had a normal colonoscopy performed   - CTAP done on 11/18 showing Diffuse wall thickening and pericolonic fat stranding extending from the cecum to the rectum, compatible with history of ulcerative colitis  - No need for repeat colonoscopy at this time  - Continue mesalamine, anusol, PPI  - Consider GI evaluation if symptoms worsen    Right breast mass  - CTAP showing Increased size of a 2.1 cm irregular nodular density in the right breast, suggestive of malignancy  - Outpatient breast surgery appt    DM  - Hold home medications  - Sliding scale insulin only due to poor oral feeds  - Monitor POCT glucose and adjust regimen as indicated    HTN, CAD, TIA  - Continue aspirin, statin, enalapril, diltiazem      DVT/GI ppx: SCD, Protonix  Diet: will advance to puree diet today  Dispo: [facility], requiring three nights stay, plan for discharge on Monday, 11/25

## 2024-11-24 NOTE — DIETITIAN INITIAL EVALUATION ADULT - OTHER INFO
64 year old f who is being managed as an inpatient for failure to thrive. PMH of ulcerative colitis, parkinsons. She has been eating poorly and he says that occasionally at night she has loose bowel movements.  #Failure to thrive  #Parkinsons disease  Oropharyngeal dysphagia secondary to Parkinsons disease

## 2024-11-24 NOTE — DIETITIAN INITIAL EVALUATION ADULT - ORAL INTAKE PTA/DIET HISTORY
spoke with family at bedside this afternoon. Diet advanced on this date to pureed. Will monitor tolerance of current diet. Poor po intake in house and PTA as per family. UBW: 130#, reported recent weight loss of -30# x 1 month associated with decreased po intake. Last BM documented 11/24. Diet/nutrition education not appropriate at this time. Diet preferences noted in chart. Will add [employer] TID with meals for additional protein/calories. a1c 7.7%. RD to remain available.

## 2024-11-25 ENCOUNTER — TRANSCRIPTION ENCOUNTER (OUTPATIENT)
Age: 64
End: 2024-11-25

## 2024-11-25 LAB
-  AMOXICILLIN/CLAVULANIC ACID: SIGNIFICANT CHANGE UP
-  AMPICILLIN/SULBACTAM: SIGNIFICANT CHANGE UP
-  AMPICILLIN: SIGNIFICANT CHANGE UP
-  AZTREONAM: SIGNIFICANT CHANGE UP
-  CEFAZOLIN: SIGNIFICANT CHANGE UP
-  CEFEPIME: SIGNIFICANT CHANGE UP
-  CEFOXITIN: SIGNIFICANT CHANGE UP
-  CEFTRIAXONE: SIGNIFICANT CHANGE UP
-  CEFUROXIME: SIGNIFICANT CHANGE UP
-  CIPROFLOXACIN: SIGNIFICANT CHANGE UP
-  ERTAPENEM: SIGNIFICANT CHANGE UP
-  GENTAMICIN: SIGNIFICANT CHANGE UP
-  IMIPENEM: SIGNIFICANT CHANGE UP
-  LEVOFLOXACIN: SIGNIFICANT CHANGE UP
-  MEROPENEM: SIGNIFICANT CHANGE UP
-  NITROFURANTOIN: SIGNIFICANT CHANGE UP
-  PIPERACILLIN/TAZOBACTAM: SIGNIFICANT CHANGE UP
-  TOBRAMYCIN: SIGNIFICANT CHANGE UP
-  TRIMETHOPRIM/SULFAMETHOXAZOLE: SIGNIFICANT CHANGE UP
ANION GAP SERPL CALC-SCNC: 9 MMOL/L — SIGNIFICANT CHANGE UP (ref 5–17)
BUN SERPL-MCNC: 6.4 MG/DL — LOW (ref 8–20)
CALCIUM SERPL-MCNC: 7.5 MG/DL — LOW (ref 8.4–10.5)
CHLORIDE SERPL-SCNC: 98 MMOL/L — SIGNIFICANT CHANGE UP (ref 96–108)
CO2 SERPL-SCNC: 28 MMOL/L — SIGNIFICANT CHANGE UP (ref 22–29)
CREAT SERPL-MCNC: 0.33 MG/DL — LOW (ref 0.5–1.3)
CULTURE RESULTS: ABNORMAL
CULTURE RESULTS: SIGNIFICANT CHANGE UP
EGFR: 114 ML/MIN/1.73M2 — SIGNIFICANT CHANGE UP
GLUCOSE BLDC GLUCOMTR-MCNC: 106 MG/DL — HIGH (ref 70–99)
GLUCOSE BLDC GLUCOMTR-MCNC: 129 MG/DL — HIGH (ref 70–99)
GLUCOSE BLDC GLUCOMTR-MCNC: 172 MG/DL — HIGH (ref 70–99)
GLUCOSE SERPL-MCNC: 109 MG/DL — HIGH (ref 70–99)
HCT VFR BLD CALC: 31.7 % — LOW (ref 34.5–45)
HGB BLD-MCNC: 10.2 G/DL — LOW (ref 11.5–15.5)
MCHC RBC-ENTMCNC: 24.9 PG — LOW (ref 27–34)
MCHC RBC-ENTMCNC: 32.2 G/DL — SIGNIFICANT CHANGE UP (ref 32–36)
MCV RBC AUTO: 77.5 FL — LOW (ref 80–100)
METHOD TYPE: SIGNIFICANT CHANGE UP
ORGANISM # SPEC MICROSCOPIC CNT: ABNORMAL
ORGANISM # SPEC MICROSCOPIC CNT: SIGNIFICANT CHANGE UP
PLATELET # BLD AUTO: 378 K/UL — SIGNIFICANT CHANGE UP (ref 150–400)
POTASSIUM SERPL-MCNC: 3.8 MMOL/L — SIGNIFICANT CHANGE UP (ref 3.5–5.3)
POTASSIUM SERPL-SCNC: 3.8 MMOL/L — SIGNIFICANT CHANGE UP (ref 3.5–5.3)
RBC # BLD: 4.09 M/UL — SIGNIFICANT CHANGE UP (ref 3.8–5.2)
RBC # FLD: 15.1 % — HIGH (ref 10.3–14.5)
SODIUM SERPL-SCNC: 135 MMOL/L — SIGNIFICANT CHANGE UP (ref 135–145)
SPECIMEN SOURCE: SIGNIFICANT CHANGE UP
SPECIMEN SOURCE: SIGNIFICANT CHANGE UP
WBC # BLD: 15.26 K/UL — HIGH (ref 3.8–10.5)
WBC # FLD AUTO: 15.26 K/UL — HIGH (ref 3.8–10.5)

## 2024-11-25 PROCEDURE — 99233 SBSQ HOSP IP/OBS HIGH 50: CPT

## 2024-11-25 RX ORDER — PANTOPRAZOLE SODIUM 40 MG/1
1 TABLET, DELAYED RELEASE ORAL
Qty: 30 | Refills: 0
Start: 2024-11-25 | End: 2024-12-24

## 2024-11-25 RX ADMIN — NITROFURANTOIN 100 MILLIGRAM(S): 100 CAPSULE ORAL at 05:40

## 2024-11-25 RX ADMIN — Medication 80 MILLIGRAM(S): at 22:14

## 2024-11-25 RX ADMIN — CARBIDOPA AND LEVODOPA 1 TABLET(S): 10; 100 TABLET ORAL at 05:41

## 2024-11-25 RX ADMIN — CARBIDOPA AND LEVODOPA 1 TABLET(S): 10; 100 TABLET ORAL at 14:47

## 2024-11-25 RX ADMIN — PANTOPRAZOLE SODIUM 40 MILLIGRAM(S): 40 TABLET, DELAYED RELEASE ORAL at 05:41

## 2024-11-25 RX ADMIN — PANTOPRAZOLE SODIUM 40 MILLIGRAM(S): 40 TABLET, DELAYED RELEASE ORAL at 17:47

## 2024-11-25 RX ADMIN — CARBIDOPA AND LEVODOPA 1 TABLET(S): 10; 100 TABLET ORAL at 00:43

## 2024-11-25 RX ADMIN — Medication 1 TABLET(S): at 05:40

## 2024-11-25 RX ADMIN — Medication 2: at 17:45

## 2024-11-25 RX ADMIN — Medication 100 MILLIGRAM(S): at 17:47

## 2024-11-25 RX ADMIN — CARBIDOPA AND LEVODOPA 1 TABLET(S): 10; 100 TABLET ORAL at 23:46

## 2024-11-25 RX ADMIN — ENALAPRIL MALEATE 2.5 MILLIGRAM(S): 2.5 TABLET ORAL at 05:41

## 2024-11-25 RX ADMIN — CARBIDOPA AND LEVODOPA 1 TABLET(S): 10; 100 TABLET ORAL at 17:47

## 2024-11-25 RX ADMIN — Medication 81 MILLIGRAM(S): at 14:47

## 2024-11-25 RX ADMIN — DILTIAZEM HYDROCHLORIDE 90 MILLIGRAM(S): 240 CAPSULE, COATED, EXTENDED RELEASE ORAL at 05:41

## 2024-11-25 RX ADMIN — NITROFURANTOIN 100 MILLIGRAM(S): 100 CAPSULE ORAL at 17:48

## 2024-11-25 RX ADMIN — MESALAMINE 500 MILLIGRAM(S): 375 CAPSULE, EXTENDED RELEASE ORAL at 17:47

## 2024-11-25 RX ADMIN — Medication 1 TABLET(S): at 14:47

## 2024-11-25 RX ADMIN — MESALAMINE 500 MILLIGRAM(S): 375 CAPSULE, EXTENDED RELEASE ORAL at 05:40

## 2024-11-25 RX ADMIN — Medication 100 MILLIGRAM(S): at 05:40

## 2024-11-25 NOTE — DISCHARGE NOTE PROVIDER - NSDCMRMEDTOKEN_GEN_ALL_CORE_FT
amantadine 100 mg oral capsule: 1 cap(s) orally 2 times a day  Anusol-HC 2.5% topical cream: Apply topically to affected area once a day  aspirin 81 mg oral delayed release tablet: 1 tab(s) orally once a day  atorvastatin 80 mg oral tablet: 1 tab(s) orally once a day (at bedtime)  Daily Chong oral tablet: 1 tab(s) orally once a day  dilTIAZem 90 mg/12 hours oral capsule, extended release: 1 cap(s) orally once a day  enalapril 2.5 mg oral tablet: 1 tab(s) orally once a day  famotidine 40 mg oral tablet: 1 tab(s) orally once a day (at bedtime)  fluticasone 50 mcg/inh inhalation powder: 1 puff(s) inhaled 2 times a day  glimepiride 2 mg oral tablet: 1 tab(s) orally 2 times a day  Januvia 100 mg oral tablet: 1 tab(s) orally once a day  mesalamine 250 mg oral capsule, extended release: 2 cap(s) orally 2 times a day  nitrofurantoin macrocrystals 100 mg oral capsule: 1 cap(s) orally 2 times a day   omeprazole 40 mg oral delayed release capsule: 1 cap(s) orally once a day  opicapone 50 mg oral capsule: 1 cap(s) orally once a day (at bedtime)  Oyster Shell Calcium with Vitamin D 500 mg-200 intl units oral tablet: 1 tab(s) orally 2 times a day  Sinemet 25 mg-100 mg oral tablet: 1 tab(s) orally 4 times a day   amantadine 100 mg oral capsule: 1 cap(s) orally 2 times a day  Anusol-HC 2.5% topical cream: Apply topically to affected area once a day  aspirin 81 mg oral delayed release tablet: 1 tab(s) orally once a day  atorvastatin 80 mg oral tablet: 1 tab(s) orally once a day (at bedtime)  Daily Chong oral tablet: 1 tab(s) orally once a day  dilTIAZem 90 mg/12 hours oral capsule, extended release: 1 cap(s) orally once a day  enalapril 2.5 mg oral tablet: 1 tab(s) orally once a day  famotidine 40 mg oral tablet: 1 tab(s) orally once a day (at bedtime)  fluticasone 50 mcg/inh inhalation powder: 1 puff(s) inhaled 2 times a day  glimepiride 2 mg oral tablet: 1 tab(s) orally 2 times a day  Januvia 100 mg oral tablet: 1 tab(s) orally once a day  mesalamine 250 mg oral capsule, extended release: 2 cap(s) orally 2 times a day  nitrofurantoin macrocrystals 100 mg oral capsule: 1 cap(s) orally 2 times a day   omeprazole 40 mg oral delayed release capsule: 1 cap(s) orally once a day  opicapone 50 mg oral capsule: 1 cap(s) orally once a day (at bedtime)  Oyster Shell Calcium with Vitamin D 500 mg-200 intl units oral tablet: 1 tab(s) orally 2 times a day  Protonix 40 mg oral delayed release tablet: 1 tab(s) orally once a day  Sinemet 25 mg-100 mg oral tablet: 1 tab(s) orally 4 times a day   amantadine 100 mg oral capsule: 1 cap(s) orally 2 times a day  Anusol-HC 2.5% topical cream: Apply topically to affected area once a day  aspirin 81 mg oral delayed release tablet: 1 tab(s) orally once a day  atorvastatin 80 mg oral tablet: 1 tab(s) orally once a day (at bedtime)  Daily Chong oral tablet: 1 tab(s) orally once a day  dilTIAZem 90 mg/12 hours oral capsule, extended release: 1 cap(s) orally once a day  enalapril 2.5 mg oral tablet: 1 tab(s) orally once a day  famotidine 40 mg oral tablet: 1 tab(s) orally once a day (at bedtime)  fluticasone 50 mcg/inh inhalation powder: 1 puff(s) inhaled 2 times a day  glimepiride 2 mg oral tablet: 1 tab(s) orally 2 times a day  Januvia 100 mg oral tablet: 1 tab(s) orally once a day  mesalamine 250 mg oral capsule, extended release: 2 cap(s) orally 2 times a day  nitrofurantoin macrocrystals 100 mg oral capsule: 1 cap(s) orally 2 times a day   opicapone 50 mg oral capsule: 1 cap(s) orally once a day (at bedtime)  Oyster Shell Calcium with Vitamin D 500 mg-200 intl units oral tablet: 1 tab(s) orally 2 times a day  Protonix 40 mg oral delayed release tablet: 1 tab(s) orally once a day  Sinemet 25 mg-100 mg oral tablet: 1 tab(s) orally 4 times a day

## 2024-11-25 NOTE — DISCHARGE NOTE PROVIDER - NSDCCPCAREPLAN_GEN_ALL_CORE_FT
PRINCIPAL DISCHARGE DIAGNOSIS  Diagnosis: Failure to thrive in adult  Assessment and Plan of Treatment: SLP evaluation done: patient to be on pureed diet  PT recommendation for [facility]      SECONDARY DISCHARGE DIAGNOSES  Diagnosis: Breast mass, right  Assessment and Plan of Treatment: Outpatient breast surgery appointment    Diagnosis: History of Parkinson's disease  Assessment and Plan of Treatment: Continue sinemet, amantidine  Outpatient PCP follow up    Diagnosis: Ulcerative colitis  Assessment and Plan of Treatment: Continue mesalamine, continue anusol    Diagnosis: HTN (hypertension)  Assessment and Plan of Treatment: Continue diltazem, enalapril

## 2024-11-25 NOTE — DISCHARGE NOTE PROVIDER - HOSPITAL COURSE
Admission HPI  64 year old female with pmhx of ulcerative colitis, DM, HTN, CAD, TIA, parkinsons presents with diarrhea and decreased appetite. Per patients family, she has had nonbloody diarrhea and generalized fatigue for the past couple of weeks. Family states they are having a difficult time taking care of her at home as she is not wanting to eat, and normally ambulates with a walker but has had difficulty doing so lately due to fatigue. Family here seeking [facility] placement. Saw her GI doctor earlier this month and had a normal colonoscopy performed at that time. Denies any worsening abdominal pain (has baseline mild pain due to UC), sob, chest pain, vomiting, fever, focal neuro deficit or any other complaints at this time. Patient lives with her son and daughter in law but due to declining functional status and poor appetite, they are afraid they are unable to take care of their mother.     During patients hospital stay - initial work up was unrevealing for any infectious process, patient was not having UC flare. Patient likely with soft stools due to having liquid and pureed diet only and being relatively immobile. Patient without any evidence of GI bleeding while in hospital. Patient was started on protonix which helped improve symptoms. Patient seen by PT and recommended for [facility] placement. Patient evaluated by SLP - recommending that patient continue on pureed diet. Patient had ct abdomen pelvis done - noted to have right breast mass which was seen previously but size from 1.8 cm to 2.1cm -- she will be given outpatient breast surgery appointment for further evaluation. Patient at this time is stable for discharge to [facility]. Admission HPI  64 year old female with pmhx of ulcerative colitis, DM, HTN, CAD, TIA, parkinsons presents with diarrhea and decreased appetite. Per patients family, she has had nonbloody diarrhea and generalized fatigue for the past couple of weeks. Family states they are having a difficult time taking care of her at home as she is not wanting to eat, and normally ambulates with a walker but has had difficulty doing so lately due to fatigue. Family here seeking [facility] placement. Saw her GI doctor earlier this month and had a normal colonoscopy performed at that time. Denies any worsening abdominal pain (has baseline mild pain due to UC), sob, chest pain, vomiting, fever, focal neuro deficit or any other complaints at this time. Patient lives with her son and daughter in law but due to declining functional status and poor appetite, they are afraid they are unable to take care of their mother.     During patients hospital stay - initial work up was unrevealing for any infectious process, patient was not having UC flare. Patient likely with soft stools due to having liquid and pureed diet only and being relatively immobile. Patient without any evidence of GI bleeding while in hospital. Patient was started on protonix which helped improve symptoms. Patient seen by PT and recommended for [facility] placement however family is opting for patient to go home. Patient evaluated by SLP - recommending that patient continue on pureed diet. Patient had ct abdomen pelvis done - noted to have right breast mass which was seen previously but size from 1.8 cm to 2.1cm -- she will be given outpatient breast surgery appointment for further evaluation. Admission HPI  64 year old female with pmhx of ulcerative colitis, DM, HTN, CAD, TIA, parkinsons presents with diarrhea and decreased appetite. Per patients family, she has had nonbloody diarrhea and generalized fatigue for the past couple of weeks. Family states they are having a difficult time taking care of her at home as she is not wanting to eat, and normally ambulates with a walker but has had difficulty doing so lately due to fatigue. Family here seeking [facility] placement. Saw her GI doctor earlier this month and had a normal colonoscopy performed at that time. Denies any worsening abdominal pain (has baseline mild pain due to UC), sob, chest pain, vomiting, fever, focal neuro deficit or any other complaints at this time. Patient lives with her son and daughter in law but due to declining functional status and poor appetite, they are afraid they are unable to take care of their mother.     During patients hospital stay - initial work up was unrevealing for any infectious process, patient was not having UC flare. Patient likely with soft stools due to having liquid and pureed diet only and being relatively immobile. Patient without any evidence of GI bleeding while in hospital. Patient was started on protonix which helped improve symptoms. Patient seen by PT and recommended for [facility] placement however family is opting for patient to go home. Patient evaluated by SLP - recommending that patient continue on pureed diet. Patient had ct abdomen pelvis done - noted to have right breast mass which was seen previously but size from 1.8 cm to 2.1cm -- she will be given outpatient breast surgery appointment for further evaluation.     Patient is unable to ambulate with a cane or rolling walker and is unable to self propel a standard wheelchair for ADLS in the home due to parkinsons disease. Patient has a caregiver who is willing to assist. Use of a transport chair will greatly improver ability to participate in MRADLs and will use it regularly in the home. The patient has not expressed and unwillingness to use the transport chair that is provided in the home and there is adequate access between rooms and manuevering space.

## 2024-11-25 NOTE — SWALLOW BEDSIDE ASSESSMENT ADULT - COMMENTS
As per MD note: "Patient is a 64 year old female who is being managed as an inpatient for failure to thrive. She has a PMH of ulcerative colitis, DM, HTN, CAD, TIA, parkinsons. She has been progressively worsening in clinical status and her son feels that he cannot take care of the patient at home alone and is seeking higher level of care for her to get stronger. She has been feeding poorly and he says that occasionally at night she has loose bowel movements which he cannot manage alone." Encouragement needed to accept PO trials Additional trials deferred due to pt with no interest

## 2024-11-25 NOTE — DISCHARGE NOTE PROVIDER - DETAILS OF MALNUTRITION DIAGNOSIS/DIAGNOSES
This patient has been assessed with a concern for Malnutrition and was treated during this hospitalization for the following Nutrition diagnosis/diagnoses:     -  11/24/2024: Moderate protein-calorie malnutrition

## 2024-11-25 NOTE — DISCHARGE NOTE PROVIDER - NSDCFUADDINST_GEN_ALL_CORE_FT
Please take your medications as prescribed to you.  Please ensure to attend the clinic appointments being scheduled.

## 2024-11-25 NOTE — DISCHARGE NOTE PROVIDER - ATTENDING DISCHARGE PHYSICAL EXAMINATION:
T(C): 36.7 (11-25-24 @ 11:35), Max: 36.7 (11-25-24 @ 11:35)  HR: 95 (11-25-24 @ 11:35) (89 - 101)  BP: 122/74 (11-25-24 @ 11:35) (109/65 - 126/73)  RR: 18 (11-25-24 @ 11:35) (18 - 18)  SpO2: 97% (11-25-24 @ 11:35) (95% - 99%)    CONSTITUTIONAL: Well groomed, no apparent distress  EYES: PERRLA and symmetric, EOMI, No conjunctival or scleral injection, non-icteric  ENMT: Oral mucosa with moist membranes. Normal dentition; no pharyngeal injection or exudates             NECK: Supple, symmetric and without tracheal deviation   RESP: No respiratory distress, no use of accessory muscles; CTA b/l, no WRR  CV: RRR, +S1S2, no MRG; no JVD; no peripheral edema  GI: Soft, NT, ND, no rebound, no guarding; no palpable masses; no hepatosplenomegaly; no hernia palpated  LYMPH: No cervical LAD or tenderness; no axillary LAD or tenderness; no inguinal LAD or tenderness  MSK: Normal gait; No digital clubbing or cyanosis; examination of the (head/neck/spine/ribs/pelvis, RUE, LUE, RLE, LLE) without misalignment,            Normal ROM without pain, no spinal tenderness, normal muscle strength/tone  SKIN: No rashes or ulcers noted; no subcutaneous nodules or induration palpable  NEURO: CN II-XII intact; normal reflexes in upper and lower extremities, sensation intact in upper and lower extremities b/l to light touch   PSYCH: Appropriate insight/judgment; A+O x 3, mood and affect appropriate, recent/remote memory intact

## 2024-11-25 NOTE — SWALLOW BEDSIDE ASSESSMENT ADULT - SLP GENERAL OBSERVATIONS
Pt received & seen seated upright in bed, awake/alert, flat affect, reduced cognition, son present via phone, Dr. Edwards present, 0/10 pain pre/post

## 2024-11-25 NOTE — SWALLOW BEDSIDE ASSESSMENT ADULT - SWALLOW EVAL: DIAGNOSIS
Mild oral dysphagia for puree, WFl for thin fluids. Pharyngeal stage of swallow clinically unremarkable for assessed trials with no overt s/s aspiration noted post intake

## 2024-11-25 NOTE — PROGRESS NOTE ADULT - ASSESSMENT
Patient is a 64 year old female who is being managed as an inpatient for failure to thrive. She has a PMH of ulcerative colitis, DM, HTN, CAD, TIA, parkinsons. She has been progressively worsening in clinical status and her son feels that he cannot take care of the patient at home alone and is seeking higher level of care for her to get stronger. She has been feeding poorly and he says that occasionally at night she has loose bowel movements which he cannot manage alone.     Failure to thrive  - Needing higher level of care  - PT evaluated the patient and recommend [facility]  - Patient needs three nights stay for placement  - Follow up SW/CM  - Likely with dyspepsia that is contributing to her FTT  - cw protonix  - Maalox prn    Parkinsons disease  Oropharyngeal dysphagia secondary to Parkinsons disease  - Continue home medications (med recs completed)  - advance to puree diet today  - Follow up SLP eval, still pending  - Consider Neuro evaluation if symptoms worsen    Diarrhea (resolved)  Ulcerative colitis  - Saw her GI doctor earlier this month and had a normal colonoscopy performed   - CTAP done on 11/18 showing Diffuse wall thickening and pericolonic fat stranding extending from the cecum to the rectum, compatible with history of ulcerative colitis  - No need for repeat colonoscopy at this time  - Continue mesalamine, anusol, PPI      Right breast mass (known previously to pt and family)  - CTAP showing Increased size of a 2.1 cm irregular nodular density in the right breast, suggestive of malignancy  - Outpatient breast surgery appt    DM  - Hold home medications  - Sliding scale insulin only due to poor oral feeds  - Monitor POCT glucose and adjust regimen as indicated    HTN, CAD, TIA  - Continue aspirin, statin, enalapril, diltiazem      DVT/GI ppx: SCD, Protonix  Diet: will advance to puree diet today  Dispo: [facility], requiring three nights stay, plan for discharge on Monday, 11/25 - pending accepting facility

## 2024-11-25 NOTE — PROGRESS NOTE ADULT - NUTRITIONAL ASSESSMENT
This patient has been assessed with a concern for Malnutrition and has been determined to have a diagnosis/diagnoses of Moderate protein-calorie malnutrition.    This patient is being managed with:   Diet Pureed-  Vegan {Accepts Vegetable Products Only}  Entered: Nov 24 2024  1:41PM    The following pending diet order is being considered for treatment of Moderate protein-calorie malnutrition:  Diet Pureed-  Vegan {Accepts Vegetable Products Only}  Supplement Feeding Modality:  Oral  Ensure Plant-Based Cans or Servings Per Day:  1       Frequency:  Three Times a day  Entered: Nov 24 2024  2:59PM

## 2024-11-25 NOTE — PROGRESS NOTE ADULT - SUBJECTIVE AND OBJECTIVE BOX
[facility] Division of Hospital Medicine      SUBJECTIVE / OVERNIGHT EVENTS:    Pt seen and examined at bedside. Pt c/o epigastric pain and nausea. Denies CP, SOB, V/D, fever, chills. Rest of ROS (-).     MEDICATIONS  (STANDING):  amantadine 100 milliGRAM(s) Oral two times a day  aspirin enteric coated 81 milliGRAM(s) Oral daily  atorvastatin 80 milliGRAM(s) Oral at bedtime  calcium carbonate 1250 mG  + Vitamin D (OsCal 500 + D) 1 Tablet(s) Oral two times a day  carbidopa/levodopa  25/100 1 Tablet(s) Oral four times a day  dextrose 5%. 1000 milliLiter(s) (50 mL/Hr) IV Continuous <Continuous>  dextrose 5%. 1000 milliLiter(s) (100 mL/Hr) IV Continuous <Continuous>  dextrose 50% Injectable 25 Gram(s) IV Push once  dextrose 50% Injectable 12.5 Gram(s) IV Push once  dextrose 50% Injectable 25 Gram(s) IV Push once  diltiazem    Tablet 90 milliGRAM(s) Oral daily  enalapril 2.5 milliGRAM(s) Oral daily  glucagon  Injectable 1 milliGRAM(s) IntraMuscular once  hydrocortisone 2.5% Rectal Cream 1 Application(s) Rectal daily  influenza  Vaccine (HIGH DOSE) 0.5 milliLiter(s) IntraMuscular once  insulin lispro (ADMELOG) corrective regimen sliding scale   SubCutaneous three times a day before meals  mesalamine ER Capsule 500 milliGRAM(s) Oral two times a day  multivitamin 1 Tablet(s) Oral daily  nitrofurantoin monohydrate/macrocrystals (MACROBID) 100 milliGRAM(s) Oral two times a day  pantoprazole  Injectable 40 milliGRAM(s) IV Push every 12 hours    MEDICATIONS  (PRN):  acetaminophen     Tablet .. 650 milliGRAM(s) Oral every 6 hours PRN Temp greater or equal to 38C (100.4F), Mild Pain (1 - 3)  aluminum hydroxide/magnesium hydroxide/simethicone Suspension 30 milliLiter(s) Oral every 4 hours PRN Dyspepsia  dextrose Oral Gel 15 Gram(s) Oral once PRN Blood Glucose LESS THAN 70 milliGRAM(s)/deciliter  melatonin 3 milliGRAM(s) Oral at bedtime PRN Insomnia  ondansetron Injectable 4 milliGRAM(s) IV Push every 8 hours PRN Nausea and/or Vomiting        I&O's Summary      PHYSICAL EXAM:  Vital Signs Last 24 Hrs  T(C): 36.3 (23 Nov 2024 11:26), Max: 36.9 (22 Nov 2024 19:19)  T(F): 97.4 (23 Nov 2024 11:26), Max: 98.4 (22 Nov 2024 19:19)  HR: 94 (23 Nov 2024 11:26) (86 - 94)  BP: 124/77 (23 Nov 2024 11:26) (100/61 - 124/78)  BP(mean): 93 (23 Nov 2024 11:26) (91 - 93)  RR: 18 (23 Nov 2024 11:26) (18 - 18)  SpO2: 96% (23 Nov 2024 11:26) (95% - 98%)    Parameters below as of 23 Nov 2024 11:26  Patient On (Oxygen Delivery Method): room air          General: Elderly F, lying in bed, in NAD  Head: Normocephalic, atraumatic  ENMT: EOMI, no scleral icterus, neck supple, no JVD  Cardiovascular: +S1, S2; Regular rate and rhythm, no murmurs.  Respiratory: CTAB, no wheezing, no rales, no rhonchi  Gastrointestinal: soft, ND, TTP epigastrium, (+) BS, (-) rebound  Neuro: AAOx3, CN2-12 intact, no FND  Musculoskeletal: Normal tone, no deformities  Skin: Warm, dry, no rash, no jaundice  Psych: Calm, cooperative     LABS:                        10.7   16.39 )-----------( 429      ( 23 Nov 2024 04:23 )             32.7     11-23    137  |  105  |  3.9[L]  ----------------------------<  103[H]  3.9   |  23.0  |  0.31[L]    Ca    7.9[L]      23 Nov 2024 04:23  Phos  2.8     11-23  Mg     1.8     11-23    TPro  6.0[L]  /  Alb  1.8[L]  /  TBili  0.3[L]  /  DBili  x   /  AST  20  /  ALT  <5  /  AlkPhos  159[H]  11-23          Urinalysis Basic - ( 23 Nov 2024 04:23 )    Color: x / Appearance: x / SG: x / pH: x  Gluc: 103 mg/dL / Ketone: x  / Bili: x / Urobili: x   Blood: x / Protein: x / Nitrite: x   Leuk Esterase: x / RBC: x / WBC x   Sq Epi: x / Non Sq Epi: x / Bacteria: x        CAPILLARY BLOOD GLUCOSE      POCT Blood Glucose.: 155 mg/dL (23 Nov 2024 12:32)  POCT Blood Glucose.: 104 mg/dL (23 Nov 2024 09:00)  POCT Blood Glucose.: 131 mg/dL (23 Nov 2024 02:00)        RADIOLOGY & ADDITIONAL TESTS:  Results Reviewed:   Imaging Personally Reviewed:  Electrocardiogram Personally Reviewed:                                          
[facility] Division of Hospital Medicine        SUBJECTIVE / OVERNIGHT EVENTS:    pt seen and examined at bedside  endorses some abdominal discomfort, not mariann pain  tolerating po diet  normal stooling and urination  Patient denies chest pain, SOB, abd pain, N/V, fever, chills, dysuria or any other complaints. All remainder ROS negative.     MEDICATIONS  (STANDING):  amantadine 100 milliGRAM(s) Oral two times a day  aspirin enteric coated 81 milliGRAM(s) Oral daily  atorvastatin 80 milliGRAM(s) Oral at bedtime  calcium carbonate 1250 mG  + Vitamin D (OsCal 500 + D) 1 Tablet(s) Oral two times a day  carbidopa/levodopa  25/100 1 Tablet(s) Oral four times a day  dextrose 5%. 1000 milliLiter(s) (50 mL/Hr) IV Continuous <Continuous>  dextrose 5%. 1000 milliLiter(s) (100 mL/Hr) IV Continuous <Continuous>  dextrose 50% Injectable 25 Gram(s) IV Push once  dextrose 50% Injectable 12.5 Gram(s) IV Push once  dextrose 50% Injectable 25 Gram(s) IV Push once  diltiazem    Tablet 90 milliGRAM(s) Oral daily  enalapril 2.5 milliGRAM(s) Oral daily  glucagon  Injectable 1 milliGRAM(s) IntraMuscular once  hydrocortisone 2.5% Rectal Cream 1 Application(s) Rectal daily  influenza  Vaccine (HIGH DOSE) 0.5 milliLiter(s) IntraMuscular once  insulin lispro (ADMELOG) corrective regimen sliding scale   SubCutaneous three times a day before meals  mesalamine ER Capsule 500 milliGRAM(s) Oral two times a day  multivitamin 1 Tablet(s) Oral daily  nitrofurantoin monohydrate/macrocrystals (MACROBID) 100 milliGRAM(s) Oral two times a day  pantoprazole  Injectable 40 milliGRAM(s) IV Push every 12 hours    MEDICATIONS  (PRN):  acetaminophen     Tablet .. 650 milliGRAM(s) Oral every 6 hours PRN Temp greater or equal to 38C (100.4F), Mild Pain (1 - 3)  aluminum hydroxide/magnesium hydroxide/simethicone Suspension 30 milliLiter(s) Oral every 4 hours PRN Dyspepsia  dextrose Oral Gel 15 Gram(s) Oral once PRN Blood Glucose LESS THAN 70 milliGRAM(s)/deciliter  melatonin 3 milliGRAM(s) Oral at bedtime PRN Insomnia  ondansetron Injectable 4 milliGRAM(s) IV Push every 8 hours PRN Nausea and/or Vomiting        I&O's Summary      PHYSICAL EXAM:  Vital Signs Last 24 Hrs  T(C): 36.4 (24 Nov 2024 11:32), Max: 36.9 (24 Nov 2024 05:02)  T(F): 97.6 (24 Nov 2024 11:32), Max: 98.5 (24 Nov 2024 05:02)  HR: 104 (24 Nov 2024 11:32) (86 - 104)  BP: 118/76 (24 Nov 2024 11:32) (111/70 - 121/75)  BP(mean): 90 (24 Nov 2024 11:32) (84 - 91)  RR: 17 (24 Nov 2024 11:32) (16 - 18)  SpO2: 97% (24 Nov 2024 11:32) (95% - 100%)    Parameters below as of 24 Nov 2024 11:32  Patient On (Oxygen Delivery Method): room air            CONSTITUTIONAL: NAD, well-groomed  ENMT: Moist oral mucosa, no pharyngeal injection or exudates; normal dentition  RESPIRATORY: Normal respiratory effort; lungs are clear to auscultation bilaterally  CARDIOVASCULAR: Regular rate and rhythm, normal S1 and S2, no murmur/rub/gallop; No lower extremity edema; Peripheral pulses are 2+ bilaterally  ABDOMEN: Nontender to palpation, normoactive bowel sounds, no rebound/guarding; No hepatosplenomegaly  MUSCLOSKELETAL:  Normal gait; no clubbing or cyanosis of digits; no joint swelling or tenderness to palpation  PSYCH: A+O to person, place, and time; affect appropriate  NEUROLOGY: CN 2-12 are intact and symmetric; no gross sensory deficits;   SKIN: No rashes; no palpable lesions    LABS:                        11.7   16.80 )-----------( 398      ( 24 Nov 2024 07:00 )             36.0     11-24    134[L]  |  98  |  3.9[L]  ----------------------------<  162[H]  3.4[L]   |  25.0  |  0.30[L]    Ca    7.7[L]      24 Nov 2024 07:00  Phos  3.2     11-24  Mg     1.7     11-24    TPro  6.0[L]  /  Alb  1.8[L]  /  TBili  0.3[L]  /  DBili  x   /  AST  20  /  ALT  <5  /  AlkPhos  159[H]  11-23          Urinalysis Basic - ( 24 Nov 2024 07:00 )    Color: x / Appearance: x / SG: x / pH: x  Gluc: 162 mg/dL / Ketone: x  / Bili: x / Urobili: x   Blood: x / Protein: x / Nitrite: x   Leuk Esterase: x / RBC: x / WBC x   Sq Epi: x / Non Sq Epi: x / Bacteria: x        CAPILLARY BLOOD GLUCOSE      POCT Blood Glucose.: 132 mg/dL (24 Nov 2024 12:21)  POCT Blood Glucose.: 130 mg/dL (24 Nov 2024 08:46)  POCT Blood Glucose.: 118 mg/dL (23 Nov 2024 22:00)  POCT Blood Glucose.: 72 mg/dL (23 Nov 2024 17:02)                                  
[facility] Division of Hospital Medicine      SUBJECTIVE / OVERNIGHT EVENTS:    pt seen and examined at bedside  pt with mild abd discomfort on deep palpation  having soft stools  tolerating pureed diet  Patient denies chest pain, SOB, abd pain, N/V, fever, chills, dysuria or any other complaints. All remainder ROS negative.     MEDICATIONS  (STANDING):  amantadine 100 milliGRAM(s) Oral two times a day  aspirin enteric coated 81 milliGRAM(s) Oral daily  atorvastatin 80 milliGRAM(s) Oral at bedtime  calcium carbonate 1250 mG  + Vitamin D (OsCal 500 + D) 1 Tablet(s) Oral two times a day  carbidopa/levodopa  25/100 1 Tablet(s) Oral four times a day  dextrose 5%. 1000 milliLiter(s) (50 mL/Hr) IV Continuous <Continuous>  dextrose 5%. 1000 milliLiter(s) (100 mL/Hr) IV Continuous <Continuous>  dextrose 50% Injectable 25 Gram(s) IV Push once  dextrose 50% Injectable 12.5 Gram(s) IV Push once  dextrose 50% Injectable 25 Gram(s) IV Push once  diltiazem    Tablet 90 milliGRAM(s) Oral daily  enalapril 2.5 milliGRAM(s) Oral daily  glucagon  Injectable 1 milliGRAM(s) IntraMuscular once  hydrocortisone 2.5% Rectal Cream 1 Application(s) Rectal daily  influenza  Vaccine (HIGH DOSE) 0.5 milliLiter(s) IntraMuscular once  insulin lispro (ADMELOG) corrective regimen sliding scale   SubCutaneous three times a day before meals  mesalamine ER Capsule 500 milliGRAM(s) Oral two times a day  multivitamin 1 Tablet(s) Oral daily  nitrofurantoin monohydrate/macrocrystals (MACROBID) 100 milliGRAM(s) Oral two times a day  pantoprazole  Injectable 40 milliGRAM(s) IV Push every 12 hours    MEDICATIONS  (PRN):  acetaminophen     Tablet .. 650 milliGRAM(s) Oral every 6 hours PRN Temp greater or equal to 38C (100.4F), Mild Pain (1 - 3)  aluminum hydroxide/magnesium hydroxide/simethicone Suspension 30 milliLiter(s) Oral every 4 hours PRN Dyspepsia  dextrose Oral Gel 15 Gram(s) Oral once PRN Blood Glucose LESS THAN 70 milliGRAM(s)/deciliter  melatonin 3 milliGRAM(s) Oral at bedtime PRN Insomnia  ondansetron Injectable 4 milliGRAM(s) IV Push every 8 hours PRN Nausea and/or Vomiting        I&O's Summary      PHYSICAL EXAM:  Vital Signs Last 24 Hrs  T(C): 36.7 (25 Nov 2024 11:35), Max: 36.7 (25 Nov 2024 11:35)  T(F): 98 (25 Nov 2024 11:35), Max: 98 (25 Nov 2024 11:35)  HR: 95 (25 Nov 2024 11:35) (89 - 101)  BP: 122/74 (25 Nov 2024 11:35) (109/65 - 126/73)  BP(mean): 90 (25 Nov 2024 11:35) (85 - 90)  RR: 18 (25 Nov 2024 11:35) (18 - 18)  SpO2: 97% (25 Nov 2024 11:35) (95% - 99%)    Parameters below as of 25 Nov 2024 11:35  Patient On (Oxygen Delivery Method): room air            CONSTITUTIONAL: NAD, well-groomed  ENMT: Moist oral mucosa, no pharyngeal injection or exudates; normal dentition  RESPIRATORY: Normal respiratory effort; lungs are clear to auscultation bilaterally  CARDIOVASCULAR: Regular rate and rhythm, normal S1 and S2, no murmur/rub/gallop; No lower extremity edema; Peripheral pulses are 2+ bilaterally  ABDOMEN: mild discomfort on deep palpation in llq  MUSCLOSKELETAL:  Normal gait; no clubbing or cyanosis of digits; no joint swelling or tenderness to palpation  PSYCH: A+O to person, place, and time; affect appropriate  NEUROLOGY: CN 2-12 are intact and symmetric; no gross sensory deficits;   SKIN: No rashes; no palpable lesions    LABS:                        10.2   15.26 )-----------( 378      ( 25 Nov 2024 04:25 )             31.7     11-25    135  |  98  |  6.4[L]  ----------------------------<  109[H]  3.8   |  28.0  |  0.33[L]    Ca    7.5[L]      25 Nov 2024 04:25  Phos  3.2     11-24  Mg     1.7     11-24            Urinalysis Basic - ( 25 Nov 2024 04:25 )    Color: x / Appearance: x / SG: x / pH: x  Gluc: 109 mg/dL / Ketone: x  / Bili: x / Urobili: x   Blood: x / Protein: x / Nitrite: x   Leuk Esterase: x / RBC: x / WBC x   Sq Epi: x / Non Sq Epi: x / Bacteria: x        Culture - Stool (collected 23 Nov 2024 10:00)  Source: .Stool  Preliminary Report (24 Nov 2024 19:44):    No enteric pathogens to date: Final culture pending      CAPILLARY BLOOD GLUCOSE      POCT Blood Glucose.: 106 mg/dL (25 Nov 2024 12:30)  POCT Blood Glucose.: 129 mg/dL (25 Nov 2024 08:52)  POCT Blood Glucose.: 110 mg/dL (24 Nov 2024 17:11)

## 2024-11-26 ENCOUNTER — TRANSCRIPTION ENCOUNTER (OUTPATIENT)
Age: 64
End: 2024-11-26

## 2024-11-26 VITALS
OXYGEN SATURATION: 97 % | DIASTOLIC BLOOD PRESSURE: 73 MMHG | RESPIRATION RATE: 18 BRPM | HEART RATE: 98 BPM | SYSTOLIC BLOOD PRESSURE: 116 MMHG | TEMPERATURE: 98 F

## 2024-11-26 LAB
CULTURE RESULTS: SIGNIFICANT CHANGE UP
GLUCOSE BLDC GLUCOMTR-MCNC: 119 MG/DL — HIGH (ref 70–99)
GLUCOSE BLDC GLUCOMTR-MCNC: 136 MG/DL — HIGH (ref 70–99)
GLUCOSE BLDC GLUCOMTR-MCNC: 154 MG/DL — HIGH (ref 70–99)
GLUCOSE BLDC GLUCOMTR-MCNC: 156 MG/DL — HIGH (ref 70–99)
SPECIMEN SOURCE: SIGNIFICANT CHANGE UP

## 2024-11-26 PROCEDURE — 97110 THERAPEUTIC EXERCISES: CPT

## 2024-11-26 PROCEDURE — 99285 EMERGENCY DEPT VISIT HI MDM: CPT

## 2024-11-26 PROCEDURE — 80048 BASIC METABOLIC PNL TOTAL CA: CPT

## 2024-11-26 PROCEDURE — 36415 COLL VENOUS BLD VENIPUNCTURE: CPT

## 2024-11-26 PROCEDURE — 87077 CULTURE AEROBIC IDENTIFY: CPT

## 2024-11-26 PROCEDURE — 87177 OVA AND PARASITES SMEARS: CPT

## 2024-11-26 PROCEDURE — 92610 EVALUATE SWALLOWING FUNCTION: CPT

## 2024-11-26 PROCEDURE — 80053 COMPREHEN METABOLIC PANEL: CPT

## 2024-11-26 PROCEDURE — 87046 STOOL CULTR AEROBIC BACT EA: CPT

## 2024-11-26 PROCEDURE — 99233 SBSQ HOSP IP/OBS HIGH 50: CPT

## 2024-11-26 PROCEDURE — 97116 GAIT TRAINING THERAPY: CPT

## 2024-11-26 PROCEDURE — 87186 SC STD MICRODIL/AGAR DIL: CPT

## 2024-11-26 PROCEDURE — 93005 ELECTROCARDIOGRAM TRACING: CPT

## 2024-11-26 PROCEDURE — 84100 ASSAY OF PHOSPHORUS: CPT

## 2024-11-26 PROCEDURE — 85027 COMPLETE CBC AUTOMATED: CPT

## 2024-11-26 PROCEDURE — 82962 GLUCOSE BLOOD TEST: CPT

## 2024-11-26 PROCEDURE — 87045 FECES CULTURE AEROBIC BACT: CPT

## 2024-11-26 PROCEDURE — 81003 URINALYSIS AUTO W/O SCOPE: CPT

## 2024-11-26 PROCEDURE — 85025 COMPLETE CBC W/AUTO DIFF WBC: CPT

## 2024-11-26 PROCEDURE — 83735 ASSAY OF MAGNESIUM: CPT

## 2024-11-26 PROCEDURE — 83036 HEMOGLOBIN GLYCOSYLATED A1C: CPT

## 2024-11-26 PROCEDURE — 87086 URINE CULTURE/COLONY COUNT: CPT

## 2024-11-26 PROCEDURE — 71045 X-RAY EXAM CHEST 1 VIEW: CPT

## 2024-11-26 RX ORDER — OMEPRAZOLE 20 MG/1
1 CAPSULE, DELAYED RELEASE ORAL
Refills: 0 | DISCHARGE

## 2024-11-26 RX ORDER — PANTOPRAZOLE SODIUM 40 MG/1
1 TABLET, DELAYED RELEASE ORAL
Qty: 30 | Refills: 0
Start: 2024-11-26 | End: 2024-12-25

## 2024-11-26 RX ADMIN — Medication 1 TABLET(S): at 14:31

## 2024-11-26 RX ADMIN — MESALAMINE 500 MILLIGRAM(S): 375 CAPSULE, EXTENDED RELEASE ORAL at 05:48

## 2024-11-26 RX ADMIN — Medication 81 MILLIGRAM(S): at 14:31

## 2024-11-26 RX ADMIN — NITROFURANTOIN 100 MILLIGRAM(S): 100 CAPSULE ORAL at 05:47

## 2024-11-26 RX ADMIN — ENALAPRIL MALEATE 2.5 MILLIGRAM(S): 2.5 TABLET ORAL at 05:48

## 2024-11-26 RX ADMIN — Medication 100 MILLIGRAM(S): at 05:49

## 2024-11-26 RX ADMIN — PANTOPRAZOLE SODIUM 40 MILLIGRAM(S): 40 TABLET, DELAYED RELEASE ORAL at 05:50

## 2024-11-26 RX ADMIN — CARBIDOPA AND LEVODOPA 1 TABLET(S): 10; 100 TABLET ORAL at 05:47

## 2024-11-26 RX ADMIN — DILTIAZEM HYDROCHLORIDE 90 MILLIGRAM(S): 240 CAPSULE, COATED, EXTENDED RELEASE ORAL at 05:48

## 2024-11-26 RX ADMIN — Medication 1 TABLET(S): at 05:50

## 2024-11-26 RX ADMIN — CARBIDOPA AND LEVODOPA 1 TABLET(S): 10; 100 TABLET ORAL at 14:31

## 2024-11-26 NOTE — DISCHARGE NOTE NURSING/CASE MANAGEMENT/SOCIAL WORK - NSDCFUADDAPPT_GEN_ALL_CORE_FT
APPTS ARE READY TO BE MADE: [X] YES    Best Family or Patient Contact (if needed):    Additional Information about above appointments (if needed):    1: Breast surgery within 14 days  2: Primary care within 14 days  3:     Other comments or requests:

## 2024-11-26 NOTE — DISCHARGE NOTE NURSING/CASE MANAGEMENT/SOCIAL WORK - FINANCIAL ASSISTANCE
[facility] provides services at a reduced cost to those who are determined to be eligible through [facility]’s financial assistance program. Information regarding [facility]’s financial assistance program can be found by going to [email or web address] or by calling [phone].

## 2024-11-26 NOTE — DISCHARGE NOTE NURSING/CASE MANAGEMENT/SOCIAL WORK - NSDCPEFALRISK_GEN_ALL_CORE
For information on Fall & Injury Prevention, visit: [email or web address] OR  [email or web address] OR  https://www.cdc.gov/steadi/patient.html

## 2024-11-26 NOTE — DISCHARGE NOTE NURSING/CASE MANAGEMENT/SOCIAL WORK - PATIENT PORTAL LINK FT
You can access the FollowMyHealth Patient Portal offered by [facility] by registering at the following website: [email or web address]. By joining [facility]’s FollowMyHealth portal, you will also be able to view your health information using other applications (apps) compatible with our system.

## 2025-01-29 ASSESSMENT — REFRACTION_CURRENTRX
OS_SPHERE: +4.50
OS_OVR_VA: 20/
OS_AXIS: 090
OS_VPRISM_DIRECTION: SV
OS_CYLINDER: -0.50
OD_VPRISM_DIRECTION: SV
OD_SPHERE: +4.00
OD_OVR_VA: 20/
OD_AXIS: 000
OD_CYLINDER: 0.00

## 2025-01-29 ASSESSMENT — REFRACTION_MANIFEST
OD_ADD: +2.25
OS_VA1: 20/20
OS_SPHERE: +2.25
OS_ADD: +2.50
OS_AXIS: 089
OD_AXIS: 108
OD_VA1: 20/50
OD_AXIS: 088
OD_SPHERE: +0.75
OD_ADD: +2.25
OS_VA2: 20/25
OD_CYLINDER: -0.50
OS_ADD: +2.25
OS_CYLINDER: -0.50
OD_CYLINDER: -1.00
OS_VA1: 20/60
OD_AXIS: 105
OS_SPHERE: +0.50
OD_SPHERE: +2.25
OD_CYLINDER: -0.50
OD_VA1: 20/20
OS_CYLINDER: -0.50
OU_VA: 20/50
OD_VA2: 20/25
OD_SPHERE: +0.50
OS_AXIS: 105

## 2025-01-29 ASSESSMENT — KERATOMETRY
OS_K1POWER_DIOPTERS: 42.25
OS_AXISANGLE_DEGREES: 062
OD_AXISANGLE_DEGREES: 020
OD_K2POWER_DIOPTERS: 42.25
OD_K1POWER_DIOPTERS: 41.50
OS_K2POWER_DIOPTERS: 42.50